# Patient Record
Sex: FEMALE | Race: BLACK OR AFRICAN AMERICAN | NOT HISPANIC OR LATINO | Employment: OTHER | URBAN - METROPOLITAN AREA
[De-identification: names, ages, dates, MRNs, and addresses within clinical notes are randomized per-mention and may not be internally consistent; named-entity substitution may affect disease eponyms.]

---

## 2017-08-28 ENCOUNTER — HISTORICAL (OUTPATIENT)
Dept: INTERNAL MEDICINE | Facility: CLINIC | Age: 57
End: 2017-08-28

## 2017-08-28 LAB
ABS NEUT (OLG): 2.63 X10(3)/MCL (ref 2.1–9.2)
ALBUMIN SERPL-MCNC: 4 GM/DL (ref 3.4–5)
ALBUMIN/GLOB SERPL: 1 RATIO (ref 1–2)
ALP SERPL-CCNC: 81 UNIT/L (ref 45–117)
ALT SERPL-CCNC: 37 UNIT/L (ref 12–78)
APPEARANCE, UA: CLEAR
AST SERPL-CCNC: 21 UNIT/L (ref 15–37)
BACTERIA #/AREA URNS AUTO: ABNORMAL /[HPF]
BASOPHILS # BLD AUTO: 0.09 X10(3)/MCL
BASOPHILS NFR BLD AUTO: 2 % (ref 0–1)
BILIRUB SERPL-MCNC: 0.5 MG/DL (ref 0.2–1)
BILIRUB UR QL STRIP: NEGATIVE
BILIRUBIN DIRECT+TOT PNL SERPL-MCNC: 0.1 MG/DL
BILIRUBIN DIRECT+TOT PNL SERPL-MCNC: 0.4 MG/DL
BUN SERPL-MCNC: 12 MG/DL (ref 7–18)
CALCIUM SERPL-MCNC: 9.6 MG/DL (ref 8.5–10.1)
CHLORIDE SERPL-SCNC: 107 MMOL/L (ref 98–107)
CHOLEST SERPL-MCNC: 150 MG/DL
CHOLEST/HDLC SERPL: 1.7 {RATIO} (ref 0–4.4)
CO2 SERPL-SCNC: 27 MMOL/L (ref 21–32)
COLOR UR: COLORLESS
CREAT SERPL-MCNC: 0.7 MG/DL (ref 0.6–1.3)
CREAT UR-MCNC: 15 MG/DL
EOSINOPHIL # BLD AUTO: 0.14 X10(3)/MCL
EOSINOPHIL NFR BLD AUTO: 3 % (ref 0–5)
ERYTHROCYTE [DISTWIDTH] IN BLOOD BY AUTOMATED COUNT: 12.2 % (ref 11.5–14.5)
EST. AVERAGE GLUCOSE BLD GHB EST-MCNC: 131 MG/DL
GLOBULIN SER-MCNC: 4.6 GM/ML (ref 2.3–3.5)
GLUCOSE (UA): NORMAL
GLUCOSE SERPL-MCNC: 82 MG/DL (ref 74–106)
HBA1C MFR BLD: 6.2 % (ref 4.2–6.3)
HCT VFR BLD AUTO: 40.1 % (ref 35–46)
HDLC SERPL-MCNC: 86 MG/DL
HGB BLD-MCNC: 13.4 GM/DL (ref 12–16)
HGB UR QL STRIP: NEGATIVE
HYALINE CASTS #/AREA URNS LPF: ABNORMAL /[LPF]
IMM GRANULOCYTES # BLD AUTO: 0.01 10*3/UL
IMM GRANULOCYTES NFR BLD AUTO: 0 %
KETONES UR QL STRIP: NEGATIVE
LDLC SERPL CALC-MCNC: 56 MG/DL (ref 0–130)
LEUKOCYTE ESTERASE UR QL STRIP: 75 LEU/UL
LYMPHOCYTES # BLD AUTO: 1.82 X10(3)/MCL
LYMPHOCYTES NFR BLD AUTO: 37 % (ref 15–40)
MCH RBC QN AUTO: 30 PG (ref 26–34)
MCHC RBC AUTO-ENTMCNC: 33.4 GM/DL (ref 31–37)
MCV RBC AUTO: 89.7 FL (ref 80–100)
MICROALBUMIN UR-MCNC: 15.3 MG/L (ref 0–19)
MICROALBUMIN/CREAT RATIO PNL UR: 102 MCG/MG CR (ref 0–29)
MONOCYTES # BLD AUTO: 0.25 X10(3)/MCL
MONOCYTES NFR BLD AUTO: 5 % (ref 4–12)
NEUTROPHILS # BLD AUTO: 2.63 X10(3)/MCL
NEUTROPHILS NFR BLD AUTO: 53 X10(3)/MCL
NITRITE UR QL STRIP: NEGATIVE
PH UR STRIP: 6.5 [PH] (ref 4.5–8)
PLATELET # BLD AUTO: 229 X10(3)/MCL (ref 130–400)
PMV BLD AUTO: 11.5 FL (ref 7.4–10.4)
POTASSIUM SERPL-SCNC: 3.9 MMOL/L (ref 3.5–5.1)
PROT SERPL-MCNC: 8.6 GM/DL (ref 6.4–8.2)
PROT UR QL STRIP: NEGATIVE
RBC # BLD AUTO: 4.47 X10(6)/MCL (ref 4–5.2)
RBC #/AREA URNS AUTO: ABNORMAL /[HPF]
SODIUM SERPL-SCNC: 143 MMOL/L (ref 136–145)
SP GR UR STRIP: 1 (ref 1–1.03)
SQUAMOUS #/AREA URNS LPF: ABNORMAL /[LPF]
TRIGL SERPL-MCNC: 42 MG/DL
TSH SERPL-ACNC: 2.46 MIU/L (ref 0.36–3.74)
UROBILINOGEN UR STRIP-ACNC: NORMAL
VLDLC SERPL CALC-MCNC: 8 MG/DL
WBC # SPEC AUTO: 4.9 X10(3)/MCL (ref 4.5–11)
WBC #/AREA URNS AUTO: ABNORMAL /HPF

## 2017-09-13 ENCOUNTER — HISTORICAL (OUTPATIENT)
Dept: INTERNAL MEDICINE | Facility: CLINIC | Age: 57
End: 2017-09-13

## 2017-09-20 LAB
COLOR STL: NORMAL
CONSISTENCY STL: NORMAL
HEMOCCULT SP1 STL QL: NEGATIVE

## 2017-09-22 LAB
COLOR STL: NORMAL
CONSISTENCY STL: NORMAL
HEMOCCULT SP2 STL QL: NEGATIVE

## 2017-09-24 LAB
COLOR STL: NORMAL
CONSISTENCY STL: NORMAL

## 2017-09-25 ENCOUNTER — HISTORICAL (OUTPATIENT)
Dept: INTERNAL MEDICINE | Facility: CLINIC | Age: 57
End: 2017-09-25

## 2017-09-29 ENCOUNTER — HISTORICAL (OUTPATIENT)
Dept: INTERNAL MEDICINE | Facility: CLINIC | Age: 57
End: 2017-09-29

## 2017-10-03 ENCOUNTER — HISTORICAL (OUTPATIENT)
Dept: RADIOLOGY | Facility: HOSPITAL | Age: 57
End: 2017-10-03

## 2018-03-07 ENCOUNTER — HISTORICAL (OUTPATIENT)
Dept: INTERNAL MEDICINE | Facility: CLINIC | Age: 58
End: 2018-03-07

## 2018-03-07 LAB
ALBUMIN SERPL-MCNC: 3.7 GM/DL (ref 3.4–5)
ALBUMIN/GLOB SERPL: 1 RATIO (ref 1–2)
ALP SERPL-CCNC: 79 UNIT/L (ref 45–117)
ALT SERPL-CCNC: 24 UNIT/L (ref 12–78)
AST SERPL-CCNC: 18 UNIT/L (ref 15–37)
BILIRUB SERPL-MCNC: 0.5 MG/DL (ref 0.2–1)
BILIRUBIN DIRECT+TOT PNL SERPL-MCNC: 0.2 MG/DL
BILIRUBIN DIRECT+TOT PNL SERPL-MCNC: 0.3 MG/DL
BUN SERPL-MCNC: 15 MG/DL (ref 7–18)
CALCIUM SERPL-MCNC: 9.3 MG/DL (ref 8.5–10.1)
CHLORIDE SERPL-SCNC: 101 MMOL/L (ref 98–107)
CO2 SERPL-SCNC: 31 MMOL/L (ref 21–32)
CREAT SERPL-MCNC: 1 MG/DL (ref 0.6–1.3)
EST. AVERAGE GLUCOSE BLD GHB EST-MCNC: 111 MG/DL
GLOBULIN SER-MCNC: 4.2 GM/ML (ref 2.3–3.5)
GLUCOSE SERPL-MCNC: 88 MG/DL (ref 74–106)
HBA1C MFR BLD: 5.5 % (ref 4.2–6.3)
HIV 1+2 AB+HIV1 P24 AG SERPL QL IA: NONREACTIVE
POTASSIUM SERPL-SCNC: 3.5 MMOL/L (ref 3.5–5.1)
PROT SERPL-MCNC: 7.9 GM/DL (ref 6.4–8.2)
SODIUM SERPL-SCNC: 140 MMOL/L (ref 136–145)

## 2018-08-03 ENCOUNTER — HISTORICAL (OUTPATIENT)
Dept: INTERNAL MEDICINE | Facility: CLINIC | Age: 58
End: 2018-08-03

## 2018-08-03 LAB
ABS NEUT (OLG): 1.87 X10(3)/MCL (ref 2.1–9.2)
ALBUMIN SERPL-MCNC: 3.9 GM/DL (ref 3.4–5)
ALBUMIN/GLOB SERPL: 1 RATIO (ref 1–2)
ALP SERPL-CCNC: 63 UNIT/L (ref 45–117)
ALT SERPL-CCNC: 46 UNIT/L (ref 12–78)
APPEARANCE, UA: ABNORMAL
AST SERPL-CCNC: 26 UNIT/L (ref 15–37)
BACTERIA #/AREA URNS AUTO: ABNORMAL /[HPF]
BASOPHILS # BLD AUTO: 0.1 X10(3)/MCL
BASOPHILS NFR BLD AUTO: 3 %
BILIRUB SERPL-MCNC: 0.6 MG/DL (ref 0.2–1)
BILIRUB UR QL STRIP: NEGATIVE
BILIRUBIN DIRECT+TOT PNL SERPL-MCNC: 0.2 MG/DL
BILIRUBIN DIRECT+TOT PNL SERPL-MCNC: 0.4 MG/DL
BUN SERPL-MCNC: 15 MG/DL (ref 7–18)
CALCIUM SERPL-MCNC: 8.9 MG/DL (ref 8.5–10.1)
CHLORIDE SERPL-SCNC: 102 MMOL/L (ref 98–107)
CHOLEST SERPL-MCNC: 132 MG/DL
CHOLEST/HDLC SERPL: 1.9 {RATIO} (ref 0–4.4)
CO2 SERPL-SCNC: 24 MMOL/L (ref 21–32)
COLOR UR: YELLOW
CREAT SERPL-MCNC: 1.1 MG/DL (ref 0.6–1.3)
CREAT UR-MCNC: 82 MG/DL
EOSINOPHIL # BLD AUTO: 0.12 10*3/UL
EOSINOPHIL NFR BLD AUTO: 3 %
ERYTHROCYTE [DISTWIDTH] IN BLOOD BY AUTOMATED COUNT: 12.2 % (ref 11.5–14.5)
EST. AVERAGE GLUCOSE BLD GHB EST-MCNC: 103 MG/DL
GLOBULIN SER-MCNC: 4 GM/ML (ref 2.3–3.5)
GLUCOSE (UA): NORMAL
GLUCOSE SERPL-MCNC: 96 MG/DL (ref 74–106)
HBA1C MFR BLD: 5.2 % (ref 4.2–6.3)
HCT VFR BLD AUTO: 36.2 % (ref 35–46)
HDLC SERPL-MCNC: 71 MG/DL
HGB BLD-MCNC: 12.1 GM/DL (ref 12–16)
HGB UR QL STRIP: NEGATIVE
HIV 1+2 AB+HIV1 P24 AG SERPL QL IA: NONREACTIVE
HYALINE CASTS #/AREA URNS LPF: ABNORMAL /[LPF]
KETONES UR QL STRIP: NEGATIVE
LDLC SERPL CALC-MCNC: 50 MG/DL (ref 0–130)
LEUKOCYTE ESTERASE UR QL STRIP: 500 LEU/UL
LYMPHOCYTES # BLD AUTO: 1.5 X10(3)/MCL
LYMPHOCYTES NFR BLD AUTO: 39 % (ref 13–40)
MCH RBC QN AUTO: 30.5 PG (ref 26–34)
MCHC RBC AUTO-ENTMCNC: 33.4 GM/DL (ref 31–37)
MCV RBC AUTO: 91.2 FL (ref 80–100)
MICROALBUMIN UR-MCNC: 10 MG/L (ref 0–19)
MICROALBUMIN/CREAT RATIO PNL UR: 12.2 MCG/MG CR (ref 0–29)
MONOCYTES # BLD AUTO: 0.27 X10(3)/MCL
MONOCYTES NFR BLD AUTO: 7 % (ref 4–12)
NEUTROPHILS # BLD AUTO: 1.87 X10(3)/MCL
NEUTROPHILS NFR BLD AUTO: 48 X10(3)/MCL
NITRITE UR QL STRIP: NEGATIVE
PH UR STRIP: 6.5 [PH] (ref 4.5–8)
PLATELET # BLD AUTO: 225 X10(3)/MCL (ref 130–400)
PMV BLD AUTO: 11.1 FL (ref 7.4–10.4)
POTASSIUM SERPL-SCNC: 3 MMOL/L (ref 3.5–5.1)
PROT SERPL-MCNC: 7.9 GM/DL (ref 6.4–8.2)
PROT UR QL STRIP: NEGATIVE
RBC # BLD AUTO: 3.97 X10(6)/MCL (ref 4–5.2)
RBC #/AREA URNS AUTO: ABNORMAL /[HPF]
SODIUM SERPL-SCNC: 135 MMOL/L (ref 136–145)
SP GR UR STRIP: 1.01 (ref 1–1.03)
SQUAMOUS #/AREA URNS LPF: >100 /[LPF]
TRIGL SERPL-MCNC: 55 MG/DL
TSH SERPL-ACNC: 2.18 MIU/L (ref 0.36–3.74)
UROBILINOGEN UR STRIP-ACNC: NORMAL
VLDLC SERPL CALC-MCNC: 11 MG/DL
WBC # SPEC AUTO: 3.9 X10(3)/MCL (ref 4.5–11)
WBC #/AREA URNS AUTO: ABNORMAL /HPF

## 2018-08-07 ENCOUNTER — HISTORICAL (OUTPATIENT)
Dept: RADIOLOGY | Facility: HOSPITAL | Age: 58
End: 2018-08-07

## 2018-09-17 ENCOUNTER — HISTORICAL (OUTPATIENT)
Dept: RADIOLOGY | Facility: HOSPITAL | Age: 58
End: 2018-09-17

## 2019-02-25 ENCOUNTER — HISTORICAL (OUTPATIENT)
Dept: INTERNAL MEDICINE | Facility: CLINIC | Age: 59
End: 2019-02-25

## 2019-02-28 ENCOUNTER — HISTORICAL (OUTPATIENT)
Dept: ADMINISTRATIVE | Facility: HOSPITAL | Age: 59
End: 2019-02-28

## 2019-02-28 LAB
EST. AVERAGE GLUCOSE BLD GHB EST-MCNC: 105 MG/DL
HBA1C MFR BLD: 5.3 % (ref 4.2–6.3)

## 2019-08-06 ENCOUNTER — HISTORICAL (OUTPATIENT)
Dept: ADMINISTRATIVE | Facility: HOSPITAL | Age: 59
End: 2019-08-06

## 2019-08-06 LAB
ABS NEUT (OLG): 1.86 X10(3)/MCL (ref 2.1–9.2)
ALBUMIN SERPL-MCNC: 4 GM/DL (ref 3.4–5)
ALBUMIN/GLOB SERPL: 1 RATIO (ref 1.1–2)
ALP SERPL-CCNC: 59 UNIT/L (ref 45–117)
ALT SERPL-CCNC: 20 UNIT/L (ref 12–78)
AST SERPL-CCNC: 14 UNIT/L (ref 15–37)
BASOPHILS # BLD AUTO: 0.06 X10(3)/MCL
BASOPHILS NFR BLD AUTO: 2 %
BILIRUB SERPL-MCNC: 0.5 MG/DL (ref 0.2–1)
BILIRUBIN DIRECT+TOT PNL SERPL-MCNC: 0.2 MG/DL
BILIRUBIN DIRECT+TOT PNL SERPL-MCNC: 0.3 MG/DL
BUN SERPL-MCNC: 24 MG/DL (ref 7–18)
CALCIUM SERPL-MCNC: 9.6 MG/DL (ref 8.5–10.1)
CHLORIDE SERPL-SCNC: 106 MMOL/L (ref 98–107)
CHOLEST SERPL-MCNC: 159 MG/DL
CHOLEST/HDLC SERPL: 1.9 {RATIO} (ref 0–4.4)
CO2 SERPL-SCNC: 31 MMOL/L (ref 21–32)
CREAT SERPL-MCNC: 1.1 MG/DL (ref 0.6–1.3)
EOSINOPHIL # BLD AUTO: 0.06 X10(3)/MCL
EOSINOPHIL NFR BLD AUTO: 2 %
ERYTHROCYTE [DISTWIDTH] IN BLOOD BY AUTOMATED COUNT: 12.2 % (ref 11.5–14.5)
EST. AVERAGE GLUCOSE BLD GHB EST-MCNC: 103 MG/DL
GLOBULIN SER-MCNC: 3.9 GM/ML (ref 2.3–3.5)
GLUCOSE SERPL-MCNC: 86 MG/DL (ref 74–106)
HBA1C MFR BLD: 5.2 % (ref 4.2–6.3)
HCT VFR BLD AUTO: 37.1 % (ref 35–46)
HDLC SERPL-MCNC: 85 MG/DL
HGB BLD-MCNC: 12.1 GM/DL (ref 12–16)
LDLC SERPL CALC-MCNC: 63 MG/DL (ref 0–130)
LYMPHOCYTES # BLD AUTO: 1.79 X10(3)/MCL
LYMPHOCYTES NFR BLD AUTO: 44 % (ref 13–40)
MCH RBC QN AUTO: 30.1 PG (ref 26–34)
MCHC RBC AUTO-ENTMCNC: 32.6 GM/DL (ref 31–37)
MCV RBC AUTO: 92.3 FL (ref 80–100)
MONOCYTES # BLD AUTO: 0.33 X10(3)/MCL
MONOCYTES NFR BLD AUTO: 8 % (ref 0–24)
NEUTROPHILS # BLD AUTO: 1.86 X10(3)/MCL
NEUTROPHILS NFR BLD AUTO: 45 X10(3)/MCL
PLATELET # BLD AUTO: 262 X10(3)/MCL (ref 130–400)
PMV BLD AUTO: 10.5 FL (ref 7.4–10.4)
POTASSIUM SERPL-SCNC: 3.4 MMOL/L (ref 3.5–5.1)
PROT SERPL-MCNC: 7.9 GM/DL (ref 6.4–8.2)
RBC # BLD AUTO: 4.02 X10(6)/MCL (ref 4–5.2)
SODIUM SERPL-SCNC: 141 MMOL/L (ref 136–145)
TRIGL SERPL-MCNC: 57 MG/DL
TSH SERPL-ACNC: 1.95 MIU/L (ref 0.36–3.74)
VLDLC SERPL CALC-MCNC: 11 MG/DL
WBC # SPEC AUTO: 4.1 X10(3)/MCL (ref 4.5–11)

## 2019-09-19 ENCOUNTER — HISTORICAL (OUTPATIENT)
Dept: RADIOLOGY | Facility: HOSPITAL | Age: 59
End: 2019-09-19

## 2019-11-05 ENCOUNTER — HISTORICAL (OUTPATIENT)
Dept: INTERNAL MEDICINE | Facility: CLINIC | Age: 59
End: 2019-11-05

## 2019-11-05 LAB
ABS NEUT (OLG): 1.66 X10(3)/MCL (ref 2.1–9.2)
ALBUMIN SERPL-MCNC: 3.8 GM/DL (ref 3.4–5)
ALBUMIN/GLOB SERPL: 0.9 RATIO (ref 1.1–2)
ALP SERPL-CCNC: 71 UNIT/L (ref 45–117)
ALT SERPL-CCNC: 25 UNIT/L (ref 12–78)
APPEARANCE, UA: CLEAR
AST SERPL-CCNC: 13 UNIT/L (ref 15–37)
BACTERIA #/AREA URNS AUTO: ABNORMAL /HPF
BASOPHILS # BLD AUTO: 0.1 X10(3)/MCL (ref 0–0.2)
BASOPHILS NFR BLD AUTO: 2 %
BILIRUB SERPL-MCNC: 0.6 MG/DL (ref 0.2–1)
BILIRUB UR QL STRIP: NEGATIVE
BILIRUBIN DIRECT+TOT PNL SERPL-MCNC: 0.2 MG/DL (ref 0–0.2)
BILIRUBIN DIRECT+TOT PNL SERPL-MCNC: 0.4 MG/DL
BUN SERPL-MCNC: 26 MG/DL (ref 7–18)
CALCIUM SERPL-MCNC: 9.9 MG/DL (ref 8.5–10.1)
CHLORIDE SERPL-SCNC: 105 MMOL/L (ref 98–107)
CO2 SERPL-SCNC: 30 MMOL/L (ref 21–32)
COLOR UR: COLORLESS
CREAT SERPL-MCNC: 1.1 MG/DL (ref 0.6–1.3)
CREAT UR-MCNC: 21 MG/DL
EOSINOPHIL # BLD AUTO: 0.1 X10(3)/MCL (ref 0–0.9)
EOSINOPHIL NFR BLD AUTO: 2 %
ERYTHROCYTE [DISTWIDTH] IN BLOOD BY AUTOMATED COUNT: 11.9 % (ref 11.5–14.5)
EST. AVERAGE GLUCOSE BLD GHB EST-MCNC: 97 MG/DL
GLOBULIN SER-MCNC: 4.2 GM/ML (ref 2.3–3.5)
GLUCOSE (UA): NORMAL
GLUCOSE SERPL-MCNC: 91 MG/DL (ref 74–106)
HBA1C MFR BLD: 5 % (ref 4.2–6.3)
HCT VFR BLD AUTO: 35.9 % (ref 35–46)
HGB BLD-MCNC: 11.9 GM/DL (ref 12–16)
HGB UR QL STRIP: NEGATIVE
HYALINE CASTS #/AREA URNS LPF: ABNORMAL /LPF
IMM GRANULOCYTES # BLD AUTO: 0.01 10*3/UL
IMM GRANULOCYTES NFR BLD AUTO: 0 %
KETONES UR QL STRIP: NEGATIVE
LEUKOCYTE ESTERASE UR QL STRIP: NEGATIVE
LYMPHOCYTES # BLD AUTO: 1.7 X10(3)/MCL (ref 0.6–4.6)
LYMPHOCYTES NFR BLD AUTO: 44 %
MCH RBC QN AUTO: 31.2 PG (ref 26–34)
MCHC RBC AUTO-ENTMCNC: 33.1 GM/DL (ref 31–37)
MCV RBC AUTO: 94 FL (ref 80–100)
MICROALBUMIN UR-MCNC: <5 MG/L (ref 0–19)
MICROALBUMIN/CREAT RATIO PNL UR: NORMAL MCG/MG CR (ref 0–29)
MONOCYTES # BLD AUTO: 0.3 X10(3)/MCL (ref 0.1–1.3)
MONOCYTES NFR BLD AUTO: 8 %
NEUTROPHILS # BLD AUTO: 1.66 X10(3)/MCL (ref 2.1–9.2)
NEUTROPHILS NFR BLD AUTO: 43 %
NITRITE UR QL STRIP: NEGATIVE
PH UR STRIP: 5 [PH] (ref 4.5–8)
PLATELET # BLD AUTO: 239 X10(3)/MCL (ref 130–400)
PMV BLD AUTO: 11 FL (ref 7.4–10.4)
POTASSIUM SERPL-SCNC: 3.5 MMOL/L (ref 3.5–5.1)
PROT SERPL-MCNC: 8 GM/DL (ref 6.4–8.2)
PROT UR QL STRIP: NEGATIVE
RBC # BLD AUTO: 3.82 X10(6)/MCL (ref 4–5.2)
RBC #/AREA URNS AUTO: ABNORMAL /HPF
SODIUM SERPL-SCNC: 141 MMOL/L (ref 136–145)
SP GR UR STRIP: 1 (ref 1–1.03)
SQUAMOUS #/AREA URNS LPF: ABNORMAL /LPF
UROBILINOGEN UR STRIP-ACNC: NORMAL
WBC # SPEC AUTO: 3.9 X10(3)/MCL (ref 4.5–11)
WBC #/AREA URNS AUTO: ABNORMAL /HPF

## 2020-02-20 ENCOUNTER — HISTORICAL (OUTPATIENT)
Dept: RADIOLOGY | Facility: HOSPITAL | Age: 60
End: 2020-02-20

## 2020-03-25 ENCOUNTER — HISTORICAL (OUTPATIENT)
Dept: INTERNAL MEDICINE | Facility: CLINIC | Age: 60
End: 2020-03-25

## 2020-09-08 ENCOUNTER — HISTORICAL (OUTPATIENT)
Dept: ADMINISTRATIVE | Facility: HOSPITAL | Age: 60
End: 2020-09-08

## 2020-09-08 LAB
ABS NEUT (OLG): 1.84 X10(3)/MCL (ref 2.1–9.2)
ALBUMIN SERPL-MCNC: 3.8 GM/DL (ref 3.4–5)
ALBUMIN/GLOB SERPL: 0.9 RATIO (ref 1.1–2)
ALP SERPL-CCNC: 68 UNIT/L (ref 45–117)
ALT SERPL-CCNC: 21 UNIT/L (ref 12–78)
AST SERPL-CCNC: 16 UNIT/L (ref 15–37)
BASOPHILS # BLD AUTO: 0.1 X10(3)/MCL (ref 0–0.2)
BASOPHILS NFR BLD AUTO: 2 %
BILIRUB SERPL-MCNC: 0.5 MG/DL (ref 0.2–1)
BILIRUBIN DIRECT+TOT PNL SERPL-MCNC: 0.2 MG/DL (ref 0–0.2)
BILIRUBIN DIRECT+TOT PNL SERPL-MCNC: 0.3 MG/DL
BUN SERPL-MCNC: 17 MG/DL (ref 7–18)
CALCIUM SERPL-MCNC: 9.6 MG/DL (ref 8.5–10.1)
CHLORIDE SERPL-SCNC: 107 MMOL/L (ref 98–107)
CHOLEST SERPL-MCNC: 198 MG/DL
CHOLEST/HDLC SERPL: 2 {RATIO} (ref 0–4.4)
CO2 SERPL-SCNC: 28 MMOL/L (ref 21–32)
CREAT SERPL-MCNC: 0.9 MG/DL (ref 0.6–1.3)
DEPRECATED CALCIDIOL+CALCIFEROL SERPL-MC: 16.3 NG/ML (ref 30–80)
EOSINOPHIL # BLD AUTO: 0.1 X10(3)/MCL (ref 0–0.9)
EOSINOPHIL NFR BLD AUTO: 2 %
ERYTHROCYTE [DISTWIDTH] IN BLOOD BY AUTOMATED COUNT: 12.8 % (ref 11.5–14.5)
EST. AVERAGE GLUCOSE BLD GHB EST-MCNC: 108 MG/DL
GLOBULIN SER-MCNC: 4.2 GM/ML (ref 2.3–3.5)
GLUCOSE SERPL-MCNC: 88 MG/DL (ref 74–106)
HBA1C MFR BLD: 5.4 % (ref 4.2–6.3)
HCT VFR BLD AUTO: 35.6 % (ref 35–46)
HDLC SERPL-MCNC: 101 MG/DL (ref 40–59)
HGB BLD-MCNC: 11.3 GM/DL (ref 12–16)
LDLC SERPL CALC-MCNC: 88 MG/DL
LYMPHOCYTES # BLD AUTO: 1.8 X10(3)/MCL (ref 0.6–4.6)
LYMPHOCYTES NFR BLD AUTO: 43 %
MCH RBC QN AUTO: 29.8 PG (ref 26–34)
MCHC RBC AUTO-ENTMCNC: 31.7 GM/DL (ref 31–37)
MCV RBC AUTO: 93.9 FL (ref 80–100)
MONOCYTES # BLD AUTO: 0.3 X10(3)/MCL (ref 0.1–1.3)
MONOCYTES NFR BLD AUTO: 8 %
NEUTROPHILS # BLD AUTO: 1.84 X10(3)/MCL (ref 2.1–9.2)
NEUTROPHILS NFR BLD AUTO: 45 %
PLATELET # BLD AUTO: 220 X10(3)/MCL (ref 130–400)
PMV BLD AUTO: 11.5 FL (ref 7.4–10.4)
POTASSIUM SERPL-SCNC: 3.6 MMOL/L (ref 3.5–5.1)
PROT SERPL-MCNC: 8 GM/DL (ref 6.4–8.2)
RBC # BLD AUTO: 3.79 X10(6)/MCL (ref 4–5.2)
SODIUM SERPL-SCNC: 142 MMOL/L (ref 136–145)
T4 FREE SERPL-MCNC: 0.94 NG/DL (ref 0.76–1.46)
TRIGL SERPL-MCNC: 44 MG/DL
TSH SERPL-ACNC: 1.45 MIU/L (ref 0.36–3.74)
VLDLC SERPL CALC-MCNC: 9 MG/DL
WBC # SPEC AUTO: 4.1 X10(3)/MCL (ref 4.5–11)

## 2020-11-09 ENCOUNTER — HISTORICAL (OUTPATIENT)
Dept: ADMINISTRATIVE | Facility: HOSPITAL | Age: 60
End: 2020-11-09

## 2020-11-09 LAB
APPEARANCE, UA: CLEAR
BACTERIA #/AREA URNS AUTO: ABNORMAL /HPF
BILIRUB UR QL STRIP: NEGATIVE
COLOR UR: NORMAL
CREAT UR-MCNC: 115.2 MG/DL (ref 45–106)
GLUCOSE (UA): NEGATIVE
HGB UR QL STRIP: NEGATIVE
HYALINE CASTS #/AREA URNS LPF: ABNORMAL /LPF
KETONES UR QL STRIP: NEGATIVE
LEUKOCYTE ESTERASE UR QL STRIP: NEGATIVE
MICROALBUMIN UR-MCNC: 5.1 UG/ML
MICROALBUMIN/CREAT RATIO PNL UR: 44.3 MG/GM CR (ref 0–30)
NITRITE UR QL STRIP: NEGATIVE
PH UR STRIP: 5.5 [PH] (ref 4.5–8)
PROT UR QL STRIP: NEGATIVE
RBC #/AREA URNS AUTO: ABNORMAL /HPF
SP GR UR STRIP: 1.02 (ref 1–1.03)
SQUAMOUS #/AREA URNS LPF: ABNORMAL /LPF
UROBILINOGEN UR STRIP-ACNC: NORMAL
WBC #/AREA URNS AUTO: ABNORMAL /HPF

## 2021-06-08 ENCOUNTER — HISTORICAL (OUTPATIENT)
Dept: RADIOLOGY | Facility: HOSPITAL | Age: 61
End: 2021-06-08

## 2022-04-10 ENCOUNTER — HISTORICAL (OUTPATIENT)
Dept: ADMINISTRATIVE | Facility: HOSPITAL | Age: 62
End: 2022-04-10
Payer: MEDICARE

## 2022-04-26 VITALS
WEIGHT: 146.63 LBS | DIASTOLIC BLOOD PRESSURE: 86 MMHG | SYSTOLIC BLOOD PRESSURE: 118 MMHG | HEIGHT: 67 IN | BODY MASS INDEX: 23.01 KG/M2

## 2022-05-02 NOTE — HISTORICAL [facility]
This is a historical note converted from Laura. Formatting and pictures may have been removed.  Please reference Laura for original formatting and attached multimedia. History of Present Illness  A 58 year old -American female with history of non-insulin diabetes mellitus type II and CVA presents to clinic for a follow up appointment.  Patient was previously seen by another provider at this clinic.  Last hemoglobin A1c 5.2 08/2018, currently on Metformin 500 mg daily.  She currently follows Neurology due to history of stroke, currently on Plavix and aspirin. No new symptoms. Patient has residual symptoms: expressive aphasia.  She reports that she had seizures in the past and was placed on Keppra by Neurology. She states that she stopped taking the medication and started having seizures. She started the medication again and reports she has not had a seizure in two years.?  She denies chest pain, shortness of breath, nausea, vomiting, loss of appetite, weight loss, polyuria, or polydipsia.  Review of Systems  ???? Constitutional: No fever, No chills, No sweats, No weakness, No fatigue, No decreased activity.  ?????Eye: No recent visual problem, No icterus, No double vision.  ?????Ear/Nose/Mouth/Throat: No nasal congestion, No sore throat.  ?????Respiratory: No shortness of breath, No cough, No sputum production, No hemoptysis, No wheezing, No cyanosis.  ?????Cardiovascular: No chest pain, No palpitations, No peripheral edema, No syncope.  ?????Gastrointestinal: No nausea, No vomiting, No diarrhea, No constipation, No hematemesis.  ?????Genitourinary: No dysuria, No hematuria, No change in urine stream, No urethral discharge.  ?????Hematology/Lymphatics: No bruising tendency, No bleeding tendency.  ?????Endocrine: No polyuria, No cold intolerance, No heat intolerance.  ?????Immunologic: Not immunocompromised, No recurrent fevers.  ?????Musculoskeletal: No joint pain, No claudication.  ?????Integumentary: No  rash, No pruritus, No abrasions, No petechiae.  ?????Neurologic: Alert and oriented X4, No abnormal balance, No numbness, No tingling.  ?????Psychiatric: No anxiety, depression, Not suicidal, Not delusional, No hallucinations.  Physical Exam  Vitals & Measurements  T:?36.8? ?C (Oral)? HR:?51(Peripheral)? BP:?131/85?  HT:?170?cm? WT:?68.1?kg? BMI:?23.56?  ???? General: Alert and oriented, No acute distress.  ?????Eye: Pupils are equal, round and reactive to light, Extraocular movements are intact, Normal conjunctiva, Vision unchanged.  ?????HENT: Normocephalic, Normal hearing, Oral mucosa is moist.  ?????Neck: Supple, No carotid bruit, No jugular venous distention, No thyromegaly.  ?????Respiratory: Lungs are clear to auscultation, Respirations are non-labored, Breath sounds are equal, Symmetrical chest wall expansion, No chest wall tenderness.  ?????Cardiovascular: Normal rate, Regular rhythm, No murmur, No gallop, Good pulses equal in all extremities, Normal peripheral perfusion, No edema.  ?????Gastrointestinal: Soft, Non-tender, Non-distended, Normal bowel sounds.  ?????Genitourinary: No costovertebral angle tenderness, No suprapubic tenderness.  ?????Musculoskeletal: Normal range of motion, Normal strength, No swelling, No deformity, Normal gait.  ?????Integumentary: Warm, No pallor, No rash.  ?????Neurologic: Alert, Oriented, Normal sensory, Normal motor function, No focal deficits, Cranial Nerves II-XII are grossly intact, Monofilament examination: No sensory loss on plantar or dorsal surface of feet bilaterally.  ?????Psychiatric: Cooperative, Appropriate mood & affect.  ???? Foot examination: No ulcer or skin breakage on plantar or dorsal surface bilaterally. Dorsalis pedis pulse palpated bilaterally.  Assessment/Plan  1.?Type 2 diabetes mellitus?E11.9  Hemoglobin A1c August 2018 was 5.2.  Repeat hemoglobin A1c today.  Currently on metformin 500 mg daily, continue.  Discussion with patient about compliance  of medication and lifestyle modifications. ?Patient hemoglobin A1c continues to be low, will decrease metformin to 250 mg daily and then discontinue.  Monofilament examination was normal 11/2018, next screening 11/2019.  2.?CVA (cerebral vascular accident)?I63.9  History of stroke.  Expressive aphasia  Currently following with neurology, continue.  Plavix and aspirin.  3.?HLD (hyperlipidemia)?E78.5  Continue statin.  4.?HTN (hypertension)?I10  Blood pressure 131/85, stable  Continue medications.  5.?Seizures?R56.9  Last seizure 2 years ago  Continue Keppra  6.?Glaucoma?H40.9  Continue to follow ophthalmology.  Continue medications.   Problem List/Past Medical History  Ongoing  CVA (cerebral vascular accident)  Depression  Diabetes  Diabetes  Glaucoma  HLD (hyperlipidemia)  HTN (hypertension)  HTN (hypertension)  Seizure disorder  Seizures  Historical  No qualifying data  Procedure/Surgical History  Total hysterectomy (06/01/1983)  COMPLETE HYST.   Medications  amLODIPine 10 mg oral tablet, 10 mg= 1 tab(s), Oral, Daily, 3 refills  Aspir-Low 81 mg oral delayed release tablet, 81 mg= 1 tab(s), Oral, Daily  atorvastatin 20 mg oral tablet, See Instructions, 1 refills  blood pressure cuff, See Instructions  brimonidine 0.2% ophthalmic solution, 1 drop(s), Eye-Both, TID, 9 refills  Bystolic 5 mg oral tablet, 5 mg= 1 tab(s), Oral, Daily  Cosopt 2.23%-0.68% ophthalmic solution, 1 drop(s), Eye-Both, BID, 11 refills  glucometer and supplies, See Instructions  hydrochlorothiazide 12.5 mg oral tablet, 12.5 mg= 1 tab(s), Oral, Daily, 3 refills  latanoprost 0.005% ophthalmic solution, 1 drop(s), Eye-Both, qPM, 9 refills  levetiracetam 500 mg oral tablet, See Instructions  lisinopril 40 mg oral tablet, 40 mg= 1 tab(s), Oral, Daily, 3 refills  metformin 500 mg oral tablet, See Instructions, 1 refills  Plavix 75 mg oral tablet, 75 mg= 1 tab(s), Oral, Daily, 3 refills  venlafaxine 75 mg oral capsule, extended release, See  Instructions, 1 refills  Allergies  No Known Medication Allergies  Social History  Alcohol - Denies Alcohol Use, 12/07/2015  Never, 04/19/2016  Employment/School  Unemployed, Highest education level: High school., 02/29/2016  Exercise  Exercise duration: 240. Exercise frequency: 5-6 times/week. Self assessment: Fair condition. Exercise type: stationary bike 50 miles per week., 09/25/2017  Home/Environment  Lives with Alone. Living situation: Home/Independent. Home equipment: Glucose monitoring, bp cuff. Alcohol abuse in household: No. Substance abuse in household: No. Smoker in household: No. Feels unsafe at home: No. Safe place to go: Yes. Family/Friends available for support: Yes., 02/29/2016  Nutrition/Health  Regular, low carb, ;low salt. high fiber, low fat, Caffeine intake amount: diet soda once a week. Wants to lose weight: Yes. Sleeping concerns: No. Feels highly stressed: No., 02/29/2016  Substance Abuse - Denies Substance Abuse, 12/07/2015  Never, 05/30/2016  Tobacco - Denies Tobacco Use, 12/07/2015  Never smoker, N/A, 11/29/2018  Never smoker Use:., 09/20/2018  Family History  Diabetes mellitus type 2: Mother.  Glaucoma.: Mother.  Immunizations  Vaccine Date Status   tetanus/diphtheria/pertussis, acel(Tdap) 08/28/2017 Given   influenza virus vaccine, inactivated 11/30/2016 Given   pneumococcal 23-polyvalent vaccine 11/30/2016 Given   Health Maintenance  Health Maintenance  ???Pending?(in the next year)  ??? ??OverDue  ??? ? ? ?Coronary Artery Disease Maintenance-Antiplatelet Agent Prescribed due??and every?  ??? ? ? ?Coronary Artery Disease Maintenance-Lipid Lowering Therapy due??and every?  ??? ??Due?  ??? ? ? ?ADL Screening due??02/28/19??and every 1??year(s)  ??? ??Due In Future?  ??? ? ? ?Aspirin Therapy for CVD Prevention not due until??05/03/19??and every 1??year(s)  ??? ? ? ?Alcohol Misuse Screening not due until??05/03/19??and every 1??year(s)  ??? ? ? ?Diabetes Maintenance-Foot Exam not due  until??08/02/19??and every 1??year(s)  ??? ? ? ?Diabetes Maintenance-Fasting Lipid Profile not due until??08/03/19??and every 1??year(s)  ??? ? ? ?Hypertension Management-BMP not due until??08/03/19??and every 1??year(s)  ??? ? ? ?Diabetes Maintenance-HgbA1c not due until??08/20/19??and every 1??year(s)  ??? ? ? ?Diabetes Maintenance-Eye Exam not due until??09/20/19??and every 1??year(s)  ??? ? ? ?Blood Pressure Screening not due until??11/29/19??and every 1??year(s)  ??? ? ? ?Body Mass Index Check not due until??11/29/19??and every 1??year(s)  ??? ? ? ?Hypertension Management-Blood Pressure not due until??11/29/19??and every 1??year(s)  ??? ? ? ?Obesity Screening not due until??11/29/19??and every 1??year(s)  ??? ? ? ?Colorectal Screening not due until??02/24/20??and every 1??year(s)  ???Satisfied?(in the past 1 year)  ??? ??Satisfied?  ??? ? ? ?Alcohol Misuse Screening on??05/03/18.??Satisfied by Echo Thomson  ??? ? ? ?Aspirin Therapy for CVD Prevention on??05/03/18.??Satisfied by Echo Thomson??Reason: Expectation Satisfied Elsewhere  ??? ? ? ?Blood Pressure Screening on??11/29/18.??Satisfied by Radha Martin  ??? ? ? ?Body Mass Index Check on??11/29/18.??Satisfied by Radha Martin  ??? ? ? ?Breast Cancer Screening on??09/17/18.??Satisfied by Savannah Urbina  ??? ? ? ?Colorectal Screening on??02/24/19.??Satisfied by Kael Aranda  ??? ? ? ?Coronary Artery Disease Maintenance-Lipid Lowering Therapy on??08/02/18.??Satisfied by Echo Thomson  ??? ? ? ?Depression Screening on??08/20/18.??Satisfied by Taylor Gordon  ??? ? ? ?Diabetes Maintenance-Eye Exam on??08/29/18.  ??? ? ? ?Diabetes Screening on??08/03/18.??Satisfied by Sathish Bright Jr.  ??? ? ? ?Hypertension Management-Blood Pressure on??11/29/18.??Satisfied by Radha Martin  ??? ? ? ?Influenza Vaccine on??08/02/18.??Satisfied by Nuris Dwyer LPN  ??? ? ? ?Lipid Screening  on??08/03/18.??Satisfied by Sathish Bright Jr.  ??? ? ? ?Obesity Screening on??11/29/18.??Satisfied by Radha Martin  ?  ?     [1]?[facility]; Yvonne Adkins MD 11/29/2018 13:19 CST  [2]?[facility]; Yvonne Adkins MD 11/29/2018 13:19 CST

## 2022-05-02 NOTE — HISTORICAL [facility]
This is a historical note converted from Laura. Formatting and pictures may have been removed.  Please reference Laura for original formatting and attached multimedia. Chief Complaint  RTC  History of Present Illness  A 58 year old -American female with history of non-insulin diabetes mellitus type II and CVA presents to clinic for a follow up appointment.  Patient was previously seen by another provider at this clinic.  Last hemoglobin A1c 5.3, currently on Metformin 500 mg daily.  She currently follows Neurology due to history of stroke, currently on Plavix and aspirin. No new symptoms. Patient has residual symptoms: expressive aphasia.  She reports that she had seizures in the past and was placed on Keppra by Neurology. She states that she stopped taking the medication and started having seizures. She started the medication again and reports she has not had a seizure in two years.?  She denies chest pain, shortness of breath, nausea, vomiting, loss of appetite, weight loss, polyuria, or polydipsia.  Review of Systems  ???? Constitutional: No fever, No chills, No sweats, No weakness, No fatigue, No decreased activity.  ?????Eye: No recent visual problem, No icterus, No double vision.  ?????Ear/Nose/Mouth/Throat: No nasal congestion, No sore throat.  ?????Respiratory: No shortness of breath, No cough, No sputum production, No hemoptysis, No wheezing, No cyanosis.  ?????Cardiovascular: No chest pain, No palpitations, No peripheral edema, No syncope.  ?????Gastrointestinal: No nausea, No vomiting, No diarrhea, No constipation, No hematemesis.  ?????Genitourinary: No dysuria, No hematuria, No change in urine stream, No urethral discharge.  ?????Hematology/Lymphatics: No bruising tendency, No bleeding tendency.  ?????Endocrine: No polyuria, No cold intolerance, No heat intolerance.  ?????Immunologic: Not immunocompromised, No recurrent fevers.  ?????Musculoskeletal: No joint pain, No  claudication.  ?????Integumentary: No rash, No pruritus, No abrasions, No petechiae.  ?????Neurologic: Alert and oriented X4, No abnormal balance, No numbness, No tingling.  ?????Psychiatric: No anxiety, depression, Not suicidal, Not delusional, No hallucinations. [1]  Physical Exam  Vitals & Measurements  T:?37.0? ?C (Oral)? HR:?63(Peripheral)? RR:?16? BP:?101/74?  HT:?170?cm? WT:?66.2?kg? BMI:?22.91?  ???? General: Alert and oriented, No acute distress.  ?????Eye: Pupils are equal, round and reactive to light, Extraocular movements are intact, Normal conjunctiva, Vision unchanged.  ?????HENT: Normocephalic, Normal hearing, Oral mucosa is moist.  ?????Neck: Supple, No carotid bruit, No jugular venous distention, No thyromegaly.  ?????Respiratory: Lungs are clear to auscultation, Respirations are non-labored, Breath sounds are equal, Symmetrical chest wall expansion, No chest wall tenderness.  ?????Cardiovascular: Normal rate, Regular rhythm, No murmur, No gallop, Good pulses equal in all extremities, Normal peripheral perfusion, No edema.  ?????Gastrointestinal: Soft, Non-tender, Non-distended, Normal bowel sounds.  ?????Genitourinary: No costovertebral angle tenderness, No suprapubic tenderness.  ?????Musculoskeletal: Normal range of motion, Normal strength, No swelling, No deformity, Normal gait.  ?????Integumentary: Warm, No pallor, No rash.  ?????Neurologic: Alert, Oriented, Normal sensory, Normal motor function, No focal deficits, Cranial Nerves II-XII are grossly intact, Monofilament examination: No sensory loss on plantar or dorsal surface of feet bilaterally.  ?????Psychiatric: Cooperative, Appropriate mood & affect.  ???? Foot examination: No ulcer or skin breakage on plantar or dorsal surface bilaterally. Dorsalis pedis pulse palpated bilaterally.   Assessment/Plan  1.?Type 2 diabetes mellitus?E11.9  Hemoglobin A1c 02/2019 was 5.3.  Repeat hemoglobin A1c today.  Currently on metformin 500 mg daily,  continue.  Discussion with patient about compliance of medication and lifestyle modifications. ?Patient hemoglobin A1c continues to be low, will decrease metformin to 250 mg daily and then discontinue.  Monofilament examination was normal 11/2018, next screening 11/2019.  Ordered:  CBC w/ Auto Diff, Routine collect, *Est. 08/06/19 3:00:00 CDT, Blood, Order for future visit, *Est. Stop date 08/06/19 3:00:00 CDT, Lab Collect, Type 2 diabetes mellitus, 08/06/19 13:38:00 CDT  Comprehensive Metabolic Panel, Routine collect, *Est. 08/06/19 3:00:00 CDT, Blood, Order for future visit, *Est. Stop date 08/06/19 3:00:00 CDT, Lab Collect, Type 2 diabetes mellitus, 08/06/19 13:38:00 CDT  Creatinine Urine, Routine collect, Urine, Order for future visit, *Est. 08/06/19 3:00:00 CDT, *Est. Stop date 08/06/19 3:00:00 CDT, Nurse collect, Type 2 diabetes mellitus, 08/06/19 13:39:00 CDT  Hemoglobin A1C [facility], Routine collect, *Est. 08/06/19 3:00:00 CDT, Blood, Order for future visit, *Est. Stop date 08/06/19 3:00:00 CDT, Lab Collect, Type 2 diabetes mellitus, 08/06/19 13:40:00 CDT  Microalbum/Creatinine Ratio Urine (Microalb/Creat), Routine collect, Urine, Order for future visit, *Est. 08/06/19 3:00:00 CDT, *Est. Stop date 08/06/19 3:00:00 CDT, Nurse collect, Type 2 diabetes mellitus  Thyroid Stimulating Hormone, Routine collect, *Est. 08/06/19 3:00:00 CDT, Blood, Order for future visit, *Est. Stop date 08/06/19 3:00:00 CDT, Lab Collect, Type 2 diabetes mellitus, 08/06/19 13:39:00 CDT  Urinalysis with Microscopic if Indicated, Routine collect, Urine, Order for future visit, *Est. 08/06/19 3:00:00 CDT, *Est. Stop date 08/06/19 3:00:00 CDT, Nurse collect, Type 2 diabetes mellitus  ?  2.?CVA (cerebral vascular accident)?I63.9  History of stroke.  Expressive aphasia  Currently following with neurology, continue.  Plavix and aspirin.  ?  3.?HLD (hyperlipidemia)?E78.5  Continue statin.  Ordered:  Lipid Panel, Routine collect, *Est. 08/06/19  3:00:00 CDT, Blood, Order for future visit, *Est. Stop date 08/06/19 3:00:00 CDT, Lab Collect, HLD (hyperlipidemia), 08/06/19 13:39:00 CDT  ?  4.?HTN (hypertension)?I10  Blood pressure 101/74, stable  Continue medications.  ?  5.?Seizure disorder?G40.909  Last seizure 2 years ago  Continue Keppra  ?  6.?Glaucoma?H40.9  Continue to follow ophthalmology.  Continue medications.  ?  Orders:  amLODIPine, See Instructions, TAKE 1 TABLET BY MOUTH EVERY DAY, # 90 tab(s), 3 Refill(s), eRx: [id number]   Problem List/Past Medical History  Ongoing  CVA (cerebral vascular accident)  Depression  Diabetes  Diabetes  Glaucoma  HLD (hyperlipidemia)  HTN (hypertension)  HTN (hypertension)  Seizure disorder  Seizures  Type 2 diabetes mellitus  Historical  No qualifying data  Procedure/Surgical History  Total hysterectomy (06/01/1983)  COMPLETE HYST.   Medications  amlodipine 10 mg oral tablet, See Instructions, 3 refills  Aspir-Low 81 mg oral delayed release tablet, 81 mg= 1 tab(s), Oral, Daily  atorvastatin 20 mg oral tablet, See Instructions, 1 refills  blood pressure cuff, See Instructions  brimonidine 0.2% ophthalmic solution, 1 drop(s), Eye-Both, TID, 1 refills  Bystolic 5 mg oral tablet, 5 mg= 1 tab(s), Oral, Daily, 1 refills  Cosopt 2.23%-0.68% ophthalmic solution, 1 drop(s), Eye-Both, BID, 11 refills  glucometer and supplies, See Instructions  hydrochlorothiazide 12.5 mg oral tablet, 12.5 mg= 1 tab(s), Oral, Daily, 3 refills  latanoprost 0.005% ophthalmic solution, 1 drop(s), Eye-Both, qPM, 1 refills  levetiracetam 500 mg oral tablet, See Instructions, 2 refills  lisinopril 40 mg oral tablet, See Instructions, 3 refills  metformin 500 mg oral tablet, See Instructions, 1 refills  Plavix 75 mg oral tablet, 75 mg= 1 tab(s), Oral, Daily, 3 refills  venlafaxine 75 mg oral capsule, extended release, See Instructions  venlafaxine 75 mg oral capsule, extended release, See Instructions,? ?Not taking: Last Dose Date/Time  Unknown  Allergies  No Known Medication Allergies  Social History  Alcohol - Denies Alcohol Use, 12/07/2015  Never, 04/19/2016  Employment/School  Unemployed, Highest education level: High school., 02/29/2016  Exercise  Exercise duration: 240. Exercise frequency: 5-6 times/week. Self assessment: Fair condition. Exercise type: stationary bike 50 miles per week., 09/25/2017  Home/Environment  Lives with Alone. Living situation: Home/Independent. Home equipment: Glucose monitoring, bp cuff. Alcohol abuse in household: No. Substance abuse in household: No. Smoker in household: No. Feels unsafe at home: No. Safe place to go: Yes. Family/Friends available for support: Yes., 02/29/2016  Nutrition/Health  Regular, low carb, ;low salt. high fiber, low fat, Caffeine intake amount: diet soda once a week. Wants to lose weight: Yes. Sleeping concerns: No. Feels highly stressed: No., 02/29/2016  Sexual  Gender Identity Identifies as female., 06/26/2019  Substance Use - Denies Substance Abuse, 12/07/2015  Never, 05/30/2016  Tobacco - Denies Tobacco Use, 12/07/2015  Never (less than 100 in lifetime), N/A, 06/26/2019  Family History  Diabetes mellitus type 2: Mother.  Glaucoma.: Mother.  Immunizations  Vaccine Date Status   tetanus/diphtheria/pertussis, acel(Tdap) 08/28/2017 Given   influenza virus vaccine, inactivated 11/30/2016 Given   pneumococcal 23-polyvalent vaccine 11/30/2016 Given   Health Maintenance  Health Maintenance  ???Pending?(in the next year)  ??? ??OverDue  ??? ? ? ?Coronary Artery Disease Maintenance-Antiplatelet Agent Prescribed due??and every?  ??? ? ? ?Coronary Artery Disease Maintenance-Lipid Lowering Therapy due??and every?  ??? ? ? ?Alcohol Misuse Screening due??01/01/19??and every 1??year(s)  ??? ? ? ?Aspirin Therapy for CVD Prevention due??05/03/19??and every 1??year(s)  ??? ? ? ?Diabetes Maintenance-Foot Exam due??08/02/19??and every 1??year(s)  ??? ? ? ?Diabetes Maintenance-Fasting Lipid Profile  due??08/03/19??and every 1??year(s)  ??? ? ? ?Hypertension Management-BMP due??08/03/19??and every 1??year(s)  ??? ? ? ?Diabetes Maintenance-Serum Creatinine due??08/03/19??and every 1??year(s)  ??? ? ? ?Diabetes Maintenance-Urine Dipstick due??08/03/19??and every 1??year(s)  ??? ??Due?  ??? ? ? ?Diabetes Maintenance-Microalbumin due??08/03/19??and every 1??year(s)  ??? ? ? ?ADL Screening due??08/06/19??and every 1??year(s)  ??? ? ? ?Influenza Vaccine due??08/06/19??and every?  ??? ??Due In Future?  ??? ? ? ?Obesity Screening not due until??01/01/20??and every 1??year(s)  ??? ? ? ?Colorectal Screening not due until??02/24/20??and every 1??year(s)  ??? ? ? ?Diabetes Maintenance-HgbA1c not due until??02/28/20??and every 1??year(s)  ??? ? ? ?Diabetes Maintenance-Eye Exam not due until??06/25/20??and every 1??year(s)  ??? ? ? ?Blood Pressure Screening not due until??08/05/20??and every 1??year(s)  ??? ? ? ?Body Mass Index Check not due until??08/05/20??and every 1??year(s)  ??? ? ? ?Hypertension Management-Blood Pressure not due until??08/05/20??and every 1??year(s)  ???Satisfied?(in the past 1 year)  ??? ??Satisfied?  ??? ? ? ?Blood Pressure Screening on??08/06/19.??Satisfied by Dana Cabrera  ??? ? ? ?Body Mass Index Check on??08/06/19.??Satisfied by Dana Cabrera  ??? ? ? ?Breast Cancer Screening on??09/17/18.??Satisfied by Savannah Urbina  ??? ? ? ?Colorectal Screening on??02/24/19.??Satisfied by Kael Aranda  ??? ? ? ?Coronary Artery Disease Maintenance-Antiplatelet Agent Prescribed on??05/20/19.??Satisfied by Rosaura Rodrigues MD  ??? ? ? ?Depression Screening on??08/06/19.??Satisfied by Dana Cabrera  ??? ? ? ?Diabetes Maintenance-Eye Exam on??06/26/19.??Satisfied by Ruth Flynn  ??? ? ? ?Diabetes Screening on??02/28/19.??Satisfied by Isabella Yusuf  ??? ? ? ?Hypertension Management-Blood Pressure on??08/06/19.??Satisfied by Dana Cabrera  ??? ? ? ?Obesity Screening  on??08/06/19.??Satisfied by Dana Cabrera  ?     [1]?[facility]; Yvonne Adkins MD 02/28/2019 13:14 CST  [2] [facility]; Yvonne Adkins MD 02/28/2019 13:14 CST

## 2022-05-02 NOTE — HISTORICAL [facility]ER
This is a historical note converted from Laura. Formatting and pictures may have been removed.  Please reference Laura for original formatting and attached multimedia. Chief Complaint  f/u  History of Present Illness  A 59 year old -American female with history of non-insulin diabetes mellitus type II and CVA presents to clinic for a follow up appointment.  Patient was previously seen by another provider at this clinic.  Last hemoglobin A1c 5.0, currently on Metformin 500 mg daily.  She currently follows Neurology due to history of stroke, currently on Plavix and aspirin. No new symptoms. Patient has residual symptoms: expressive aphasia.  She reports that she had seizures in the past and was placed on Keppra by Neurology. She states that she stopped taking the medication and started having seizures. She started the medication again and reports she has not had a seizure in?almost three?years previously.  She denies chest pain, shortness of breath, nausea, vomiting, loss of appetite, weight loss, polyuria, or polydipsia.  Review of Systems  Constitutional: No fever, No chills, No sweats, No weakness, No fatigue, No decreased activity.  ?????Eye: No recent visual problem, No icterus, No double vision.  ?????Ear/Nose/Mouth/Throat: No nasal congestion, No sore throat.  ?????Respiratory: No shortness of breath, No cough, No sputum production, No hemoptysis, No wheezing, No cyanosis.  ?????Cardiovascular: No chest pain, No palpitations, No peripheral edema, No syncope.  ?????Gastrointestinal: No nausea, No vomiting, No diarrhea, No constipation, No hematemesis.  ?????Genitourinary: No dysuria, No hematuria, No change in urine stream, No urethral discharge.  ?????Hematology/Lymphatics: No bruising tendency, No bleeding tendency.  ?????Endocrine: No polyuria, No cold intolerance, No heat intolerance.  ?????Immunologic: Not immunocompromised, No recurrent fevers.  ?????Musculoskeletal: No joint pain, No  claudication.  ?????Integumentary: No rash, No pruritus, No abrasions, No petechiae.  ?????Neurologic: Alert and oriented X4, No abnormal balance, No numbness, No tingling.  ?????Psychiatric: No anxiety, depression, Not suicidal, Not delusional, No hallucinations. [1]  Physical Exam  Vitals & Measurements  T:?37.1? ?C (Oral)? HR:?59(Peripheral)? RR:?20? BP:?104/69?  HT:?170.00?cm? WT:?65.500?kg? BMI:?22.66?  General: Alert and oriented, No acute distress.  ?????Eye: Pupils are equal, round and reactive to light, Extraocular movements are intact, Normal conjunctiva, Vision unchanged.  ?????HENT: Normocephalic, Normal hearing, Oral mucosa is moist.  ?????Neck: Supple, No carotid bruit, No jugular venous distention, No thyromegaly.  ?????Respiratory: Lungs are clear to auscultation, Respirations are non-labored, Breath sounds are equal, Symmetrical chest wall expansion, No chest wall tenderness.  ?????Cardiovascular: Normal rate, Regular rhythm, No murmur, No gallop, Good pulses equal in all extremities, Normal peripheral perfusion, No edema.  ?????Gastrointestinal: Soft, Non-tender, Non-distended, Normal bowel sounds.  ?????Genitourinary: No costovertebral angle tenderness, No suprapubic tenderness.  ?????Musculoskeletal: Normal range of motion, Normal strength, No swelling, No deformity, Normal gait.  ?????Integumentary: Warm, No pallor, No rash.  ?????Neurologic: Alert, Oriented, Normal sensory, Normal motor function, No focal deficits, Cranial Nerves II-XII are grossly intact, Monofilament examination: No sensory loss on plantar or dorsal surface of feet bilaterally.  ?????Psychiatric: Cooperative, Appropriate mood & affect.  ???? Foot examination: No ulcer or skin breakage on plantar or dorsal surface bilaterally. Dorsalis pedis pulse palpated bilaterally.  Assessment/Plan  1.?Type 2 diabetes mellitus?E11.9  Hemoglobin A1c 08/2019 was 5.2.  Repeat hemoglobin A1c today.  Currently on metformin 250 mg daily,  continue.  Discussion with patient about compliance of medication and lifestyle modifications. ?Patient hemoglobin A1c continues to be low, will discontinue medication.  Monofilament examination was normal 11/2019, next screening 11/2020.  Ordered:  Clinic Follow up, *Est. 11/08/20 3:00:00 CST, Order for future visit, Type 2 diabetes mellitus  Chronic kidney disease, stage II (mild)  History of CVA in adulthood  HTN (hypertension)  HLD (hyperlipidemia)  Seizure disorder  Glaucoma  Healthcare maintenance, ...  Creatinine Urine, Routine collect, Urine, Order for future visit, *Est. 09/08/20 3:00:00 CDT, *Est. Stop date 09/08/20 3:00:00 CDT, Nurse collect, Type 2 diabetes mellitus, 09/08/20 13:19:00 CDT  Hemoglobin A1C [facility], Routine collect, *Est. 09/08/20 3:00:00 CDT, Blood, Order for future visit, *Est. Stop date 09/08/20 3:00:00 CDT, Lab Collect, Type 2 diabetes mellitus, 09/08/20 13:19:00 CDT  Microalbum/Creatinine Ratio Urine (Microalb/Creat), Routine collect, Urine, Order for future visit, *Est. 09/08/20 3:00:00 CDT, *Est. Stop date 09/08/20 3:00:00 CDT, Nurse collect, Type 2 diabetes mellitus  Microalbum/Creatinine Ratio Urine (Microalb/Creat), Routine collect, Urine, Order for future visit, *Est. 09/08/20 3:00:00 CDT, *Est. Stop date 09/08/20 3:00:00 CDT, Nurse collect, Type 2 diabetes mellitus  Office/Outpatient Visit Level 4 Established [id number] PC, Type 2 diabetes mellitus  Chronic kidney disease, stage II (mild)  History of CVA in adulthood  HTN (hypertension)  HLD (hyperlipidemia)  Seizure disorder  Glaucoma  Healthcare maintenance, [facility] Int [facility] C, 09/08/20 13:28:00 CDT  Urinalysis with Microscopic if Indicated, Routine collect, Urine, Order for future visit, *Est. 09/08/20 3:00:00 CDT, *Est. Stop date 09/08/20 3:00:00 CDT, Nurse collect, Type 2 diabetes mellitus  ?  2.?Chronic kidney disease, stage II (mild)?N18.2  GFR 66  Long discussion about avoiding nephrotoxic drugs  Increase water  intake  US retroperitoneum [2]  Ordered:  CBC w/ Auto Diff, Routine collect, *Est. 09/08/20 3:00:00 CDT, Blood, Order for future visit, *Est. Stop date 09/08/20 3:00:00 CDT, Lab Collect, Chronic kidney disease, stage II (mild), 09/08/20 13:19:00 CDT  Clinic Follow up, *Est. 11/08/20 3:00:00 CST, Order for future visit, Type 2 diabetes mellitus  Chronic kidney disease, stage II (mild)  History of CVA in adulthood  HTN (hypertension)  HLD (hyperlipidemia)  Seizure disorder  Glaucoma  Healthcare maintenance, ...  Comprehensive Metabolic Panel, Routine collect, *Est. 09/08/20 3:00:00 CDT, Blood, Order for future visit, *Est. Stop date 09/08/20 3:00:00 CDT, Lab Collect, Chronic kidney disease, stage II (mild), 09/08/20 13:19:00 CDT  Office/Outpatient Visit Level 4 Established [id number] PC, Type 2 diabetes mellitus  Chronic kidney disease, stage II (mild)  History of CVA in adulthood  HTN (hypertension)  HLD (hyperlipidemia)  Seizure disorder  Glaucoma  Healthcare maintenance, [facility] Int [facility] C, 09/08/20 13:28:00 CDT  ?  3.?History of CVA in adulthood?Z86.73  ?History of stroke.  Expressive aphasia  Currently following with neurology, continue.  Plavix and aspirin. [3]  Ordered:  Clinic Follow up, *Est. 11/08/20 3:00:00 CST, Order for future visit, Type 2 diabetes mellitus  Chronic kidney disease, stage II (mild)  History of CVA in adulthood  HTN (hypertension)  HLD (hyperlipidemia)  Seizure disorder  Glaucoma  Healthcare maintenance, ...  Office/Outpatient Visit Level 4 Established [id number] PC, Type 2 diabetes mellitus  Chronic kidney disease, stage II (mild)  History of CVA in adulthood  HTN (hypertension)  HLD (hyperlipidemia)  Seizure disorder  Glaucoma  Healthcare maintenance, [facility] Int [facility], 09/08/20 13:28:00 CDT  ?  4.?HTN (hypertension)?I10  ?Blood pressure 109/80, stable  Continue medications [4]  Ordered:  Clinic Follow up, *Est. 11/08/20 3:00:00 CST, Order for future visit, Type 2 diabetes  mellitus  Chronic kidney disease, stage II (mild)  History of CVA in adulthood  HTN (hypertension)  HLD (hyperlipidemia)  Seizure disorder  Glaucoma  Healthcare maintenance, ...  Office/Outpatient Visit Level 4 Established [id number] PC, Type 2 diabetes mellitus  Chronic kidney disease, stage II (mild)  History of CVA in adulthood  HTN (hypertension)  HLD (hyperlipidemia)  Seizure disorder  Glaucoma  Healthcare maintenance, [facility] Int [facility], 09/08/20 13:28:00 CDT  ?  5.?HLD (hyperlipidemia)?E78.5  ?Continue statin. [5]  Ordered:  Clinic Follow up, *Est. 11/08/20 3:00:00 CST, Order for future visit, Type 2 diabetes mellitus  Chronic kidney disease, stage II (mild)  History of CVA in adulthood  HTN (hypertension)  HLD (hyperlipidemia)  Seizure disorder  Glaucoma  Healthcare maintenance, ...  Lipid Panel, Routine collect, *Est. 09/08/20 3:00:00 CDT, Blood, Order for future visit, *Est. Stop date 09/08/20 3:00:00 CDT, Lab Collect, HLD (hyperlipidemia), 09/08/20 13:19:00 CDT  Office/Outpatient Visit Level 4 Established [id number] PC, Type 2 diabetes mellitus  Chronic kidney disease, stage II (mild)  History of CVA in adulthood  HTN (hypertension)  HLD (hyperlipidemia)  Seizure disorder  Glaucoma  Healthcare maintenance, [facility] Int [facility], 09/08/20 13:28:00 CDT  ?  6.?Seizure disorder?G40.909  ?Continue Keppra [6]  Ordered:  Clinic Follow up, *Est. 11/08/20 3:00:00 CST, Order for future visit, Type 2 diabetes mellitus  Chronic kidney disease, stage II (mild)  History of CVA in adulthood  HTN (hypertension)  HLD (hyperlipidemia)  Seizure disorder  Glaucoma  Healthcare maintenance, ...  Office/Outpatient Visit Level 4 Established [id number] PC, Type 2 diabetes mellitus  Chronic kidney disease, stage II (mild)  History of CVA in adulthood  HTN (hypertension)  HLD (hyperlipidemia)  Seizure disorder  Glaucoma  Healthcare maintenance, [facility], 09/08/20 13:28:00  CDT  ?  7.?Glaucoma?H40.9  ?Continue to follow ophthalmology.  Continue medications.? [7]  Ordered:  Clinic Follow up, *Est. 11/08/20 3:00:00 CST, Order for future visit, Type 2 diabetes mellitus  Chronic kidney disease, stage II (mild)  History of CVA in adulthood  HTN (hypertension)  HLD (hyperlipidemia)  Seizure disorder  Glaucoma  Healthcare maintenance, ...  Office/Outpatient Visit Level 4 Established [id number] PC, Type 2 diabetes mellitus  Chronic kidney disease, stage II (mild)  History of CVA in adulthood  HTN (hypertension)  HLD (hyperlipidemia)  Seizure disorder  Glaucoma  Healthcare maintenance, [facility] Int [facility], 09/08/20 13:28:00 CDT  ?  Orders:  amLODIPine, See Instructions, TAKE 1 TABLET BY MOUTH EVERY DAY, # 90 tab(s), 3 Refill(s), [id number], 170, cm, Height/Length Dosing, 09/08/20 12:49:00 CDT, 65.5, kg, Weight Dosing, 09/08/20 12:49:00 CDT  atorvastatin, See Instructions, TAKE 1 TABLET BY MOUTH EVERY DAY, # 90 tab(s), 2 Refill(s), Pharmacy: [facility]/pharmacy #[id number], 170, cm, Height/Length Dosing, 09/08/20 12:49:00 CDT, 65.5, kg, Weight Dosing, 09/08/20 12:49:00 CDT  clopidogrel, See Instructions, TAKE 1 TABLET BY MOUTH EVERY DAY, # 90 tab(s), 3 Refill(s), Pharmacy: [facility]/pharmacy #[id number], 170, cm, Height/Length Dosing, 09/08/20 12:49:00 CDT, 65.5, kg, Weight Dosing, 09/08/20 12:49:00 CDT  hydrochlorothiazide, 12.5 mg = 1 tab(s), Oral, Daily, # 90 tab(s), 3 Refill(s), Pharmacy: [facility]/pharmacy #[id number], 170, cm, Height/Length Dosing, 09/08/20 12:49:00 CDT, 65.5, kg, Weight Dosing, 09/08/20 12:49:00 CDT  levETIRAcetam, 500 mg = 1 tab(s), Oral, BID, # 180 tab(s), 2 Refill(s), Pharmacy: [facility]/pharmacy #[id number], 170, cm, Height/Length Dosing, 09/08/20 12:49:00 CDT, 65.5, kg, Weight Dosing, 09/08/20 12:49:00 CDT  lisinopril, See Instructions, TAKE 1 TABLET BY MOUTH EVERY DAY, # 90 tab(s), 0 Refill(s), Pharmacy: [facility]/pharmacy #[id number], 170, cm, Height/Length Dosing, 09/08/20 12:49:00 CDT, 65.5, kg,  Weight Dosing, 09/08/20 12:49:00 CDT  metFORMIN, See Instructions, TAKE 1 TABLET BY MOUTH EVERY DAY, # 90 tab(s), 2 Refill(s), [id number] 170, cm, Height/Length Dosing, 09/08/20 12:49:00 CDT, 65.5, kg, Weight Dosing, 09/08/20 12:49:00 CDT  nebivolol, 5 mg = 1 tab(s), Oral, Daily, # 90 tab(s), 1 Refill(s), [id number], 170, cm, Height/Length Dosing, 09/08/20 12:49:00 CDT, 65.5, kg, Weight Dosing, 09/08/20 12:49:00 CDT  venlafaxine, 75 mg = 1 cap(s), Oral, Daily, # 90 cap(s), 2 Refill(s), [id number], 170, cm, Height/Length Dosing, 09/08/20 12:49:00 CDT, 65.5, kg, Weight Dosing, 09/08/20 12:49:00 CDT  Free T4, Routine collect, *Est. 09/08/20 3:00:00 CDT, Blood, Order for future visit, *Est. Stop date 09/08/20 3:00:00 CDT, Lab Collect, Healthcare maintenance, 09/08/20 13:19:00 CDT  Thyroid Stimulating Hormone, Routine collect, *Est. 09/08/20 3:00:00 CDT, Blood, Order for future visit, *Est. Stop date 09/08/20 3:00:00 CDT, Lab Collect, Healthcare maintenance, 09/08/20 13:19:00 CDT  Vitamin D, 25-Hydroxy Level, Routine collect, *Est. 09/08/20 3:00:00 CDT, Blood, Order for future visit, *Est. Stop date 09/08/20 3:00:00 CDT, Lab Collect, Healthcare maintenance, 09/08/20 13:20:00 CDT  Referrals  [facility] Internal Referral to Gynecology Clinic, Specialty: Gynecology, Reason: Annual Examination, Start: 09/08/20 13:22:00 CDT  [facility] Internal Referral to Ophthalmology Fundus Clinic, Specialty: Ophthalmology, Reason: Annual diabetic fundus photo, Start: 09/08/20 13:20:00 CDT  Clinic Follow up, *Est. 11/08/20 3:00:00 CST, Order for future visit, Type 2 diabetes mellitus  Chronic kidney disease, stage II (mild)  History of CVA in adulthood  HTN (hypertension)  HLD (hyperlipidemia)  Seizure disorder  Glaucoma  Healthcare maintenance, ...   Problem List/Past Medical History  Ongoing  CVA (cerebrovascular accident)  Depression  Diabetes  Diabetes  Glaucoma  History of CVA in  adulthood  HLD (hyperlipidemia)  HTN (hypertension)  HTN (hypertension)  Primary open angle glaucoma (POAG) of both eyes, severe stage  Seizure disorder  Seizures  Type 2 diabetes mellitus  Historical  No qualifying data  Procedure/Surgical History  Total hysterectomy (06/01/1983)  COMPLETE HYST.   Medications  amlodipine 10 mg oral tablet, See Instructions, 3 refills  Aspir-Low 81 mg oral delayed release tablet, 81 mg= 1 tab(s), Oral, Daily  atorvastatin 20 mg oral tablet, See Instructions, 2 refills  brimonidine 0.2% ophthalmic solution, 1 drop(s), Eye-Both, TID, 1 refills  Bystolic 5 mg oral tablet, 5 mg= 1 tab(s), Oral, Daily, 1 refills  clopidogrel 75 mg oral tablet, See Instructions, 3 refills  Cosopt 2.23%-0.68% ophthalmic solution, 1 drop(s), Eye-Both, BID, 11 refills  hydrochlorothiazide 12.5 mg oral tablet, 12.5 mg= 1 tab(s), Oral, Daily, 3 refills  Keppra 500 mg oral tablet, 500 mg= 1 tab(s), Oral, BID, 2 refills  latanoprost 0.005% ophthalmic solution, See Instructions, 11 refills  latanoprost 0.005% ophthalmic solution, See Instructions  lisinopril 40 mg oral tablet, See Instructions  metformin 500 mg oral tablet, See Instructions, 2 refills  True Metrix Lancets 30G, See Instructions, 6 refills  venlafaxine 75 mg oral capsule, extended release, 75 mg= 1 cap(s), Oral, Daily, 2 refills  Allergies  No Known Allergies  No Known Medication Allergies  Social History  Abuse/Neglect  No, 09/08/2020  Alcohol - Denies Alcohol Use, 12/07/2015  Never, 02/21/2020  Employment/School  Unemployed, Highest education level: High school., 02/29/2016  Exercise  Exercise duration: 240. Exercise frequency: 5-6 times/week. Self assessment: Fair condition. Exercise type: stationary bike 50 miles per week., 09/25/2017  Home/Environment  Lives with Alone. Living situation: Home/Independent. Home equipment: Glucose monitoring, bp cuff. Alcohol abuse in household: No. Substance abuse in household: No. Smoker in household: No.  Feels unsafe at home: No. Safe place to go: Yes. Family/Friends available for support: Yes., 02/29/2016  Nutrition/Health  Regular, low carb, ;low salt. high fiber, low fat, Caffeine intake amount: diet soda once a week. Wants to lose weight: Yes. Sleeping concerns: No. Feels highly stressed: No., 02/29/2016  Sexual  Gender Identity Identifies as female., 06/26/2019  Spiritual/Cultural  Gnosticist, 09/08/2020  Substance Use - Denies Substance Abuse, 12/07/2015  Never, 02/21/2020  Tobacco - Denies Tobacco Use, 12/07/2015  Never (less than 100 in lifetime), N/A, 09/08/2020  Family History  Diabetes mellitus type 2: Mother.  Glaucoma.: Mother.  Immunizations  Vaccine Date Status   tetanus/diphtheria/pertussis, acel(Tdap) 08/28/2017 Given   influenza virus vaccine, inactivated 11/30/2016 Given   pneumococcal 23-polyvalent vaccine 11/30/2016 Given   Health Maintenance  Health Maintenance  ???Pending?(in the next year)  ??? ??OverDue  ??? ? ? ?Coronary Artery Disease Maintenance-Antiplatelet Agent Prescribed due??and every?  ??? ? ? ?Diabetes Maintenance-Microalbumin due??and every?  ??? ? ? ?Influenza Vaccine due??and every?  ??? ? ? ?Aspirin Therapy for CVD Prevention due??05/03/19??and every 1??year(s)  ??? ? ? ?Diabetes Maintenance-Foot Exam due??08/02/19??and every 1??year(s)  ??? ? ? ?Alcohol Misuse Screening due??01/02/20??and every 1??year(s)  ??? ? ? ?Colorectal Screening due??02/24/20??and every 1??year(s)  ??? ? ? ?Diabetes Maintenance-Fasting Lipid Profile due??08/06/20??and every 1??year(s)  ??? ??Due?  ??? ? ? ?Medicare Annual Wellness Exam due??09/08/20??and every 1??year(s)  ??? ? ? ?Zoster Vaccine due??09/08/20??and every?  ??? ??Due In Future?  ??? ? ? ?Diabetes Maintenance-Eye Exam not due until??10/22/20??and every 1??year(s)  ??? ? ? ?Diabetes Maintenance-HgbA1c not due until??11/04/20??and every 1??year(s)  ??? ? ? ?Hypertension Management-BMP not due until??11/04/20??and every 1??year(s)  ??? ? ?  ?Diabetes Maintenance-Serum Creatinine not due until??11/05/20??and every 1??year(s)  ??? ? ? ?Obesity Screening not due until??01/01/21??and every 1??year(s)  ???Satisfied?(in the past 1 year)  ??? ??Satisfied?  ??? ? ? ?ADL Screening on??09/08/20.??Satisfied by Britney Lemus LPN  ??? ? ? ?Blood Pressure Screening on??09/08/20.??Satisfied by Britney Lemus LPN  ??? ? ? ?Body Mass Index Check on??09/08/20.??Satisfied by Britney Lemus LPN  ??? ? ? ?Breast Cancer Screening on??09/19/19.??Satisfied by Kristal Whitaker  ??? ? ? ?Coronary Artery Disease Maintenance-Antiplatelet Agent Prescribed on??09/08/20.??Satisfied by Yvonne Adkins MD  ??? ? ? ?Depression Screening on??09/08/20.??Satisfied by Britney Lemus LPN  ??? ? ? ?Diabetes Maintenance-Serum Creatinine on??11/05/19.??Satisfied by Pam Alaniz  ??? ? ? ?Diabetes Screening on??11/05/19.??Satisfied by Ubaldo Salcedo  ??? ? ? ?Hypertension Management-Blood Pressure on??09/08/20.??Satisfied by Britney Lemus LPN  ??? ? ? ?Obesity Screening on??09/08/20.??Satisfied by Britney Lemus LPN  ??? ??Refused?  ??? ? ? ?Zoster Vaccine on??09/08/20.??Recorded by Britney Lemus LPN??Reason: Patient Refuses  ?     [1]?[facility]; Yvonne Adkins MD 02/04/2020 14:44 CST  [2]?[facility]; Yvonne Adkins MD 02/04/2020 14:44 CST  [3]?[facility]; Yvonne Adkins MD 02/04/2020 14:44 CST  [4]?[facility]; Jed LIZARRAGA, Yvonne 02/04/2020 14:44 CST  [5]?[facility]; Jed LIZARRAGA, Yvonne 02/04/2020 14:44 CST  [6]?[facility]; Jed LIZARRAGA, Yvonne 02/04/2020 14:44 CST  [7]?[facility]; Jed LIZARRAGA, Yvonne 02/04/2020 14:44 CST

## 2022-05-03 ENCOUNTER — OFFICE VISIT (OUTPATIENT)
Dept: OPHTHALMOLOGY | Facility: CLINIC | Age: 62
End: 2022-05-03
Payer: MEDICARE

## 2022-05-03 VITALS — HEIGHT: 66 IN | BODY MASS INDEX: 23.03 KG/M2 | WEIGHT: 143.31 LBS

## 2022-05-03 DIAGNOSIS — H25.13 AGE-RELATED NUCLEAR CATARACT OF BOTH EYES: Primary | ICD-10-CM

## 2022-05-03 DIAGNOSIS — H40.1113 PRIMARY OPEN ANGLE GLAUCOMA (POAG) OF RIGHT EYE, SEVERE STAGE: ICD-10-CM

## 2022-05-03 DIAGNOSIS — H40.1122 PRIMARY OPEN ANGLE GLAUCOMA (POAG) OF LEFT EYE, MODERATE STAGE: ICD-10-CM

## 2022-05-03 PROCEDURE — 99213 OFFICE O/P EST LOW 20 MIN: CPT | Mod: PBBFAC,PO | Performed by: OPHTHALMOLOGY

## 2022-05-03 RX ORDER — BRIMONIDINE TARTRATE 1.5 MG/ML
1 SOLUTION/ DROPS OPHTHALMIC 3 TIMES DAILY
COMMUNITY
End: 2023-08-28 | Stop reason: SDUPTHER

## 2022-05-03 RX ORDER — METFORMIN HYDROCHLORIDE 1000 MG/1
1000 TABLET ORAL 2 TIMES DAILY WITH MEALS
COMMUNITY
End: 2022-10-14 | Stop reason: SDUPTHER

## 2022-05-03 RX ORDER — DORZOLAMIDE HYDROCHLORIDE AND TIMOLOL MALEATE 20; 5 MG/ML; MG/ML
1 SOLUTION/ DROPS OPHTHALMIC 2 TIMES DAILY
COMMUNITY
End: 2022-12-07 | Stop reason: SDUPTHER

## 2022-05-03 RX ORDER — LATANOPROST 50 UG/ML
1 SOLUTION/ DROPS OPHTHALMIC NIGHTLY
COMMUNITY
End: 2022-12-07 | Stop reason: SDUPTHER

## 2022-05-03 RX ORDER — CLOPIDOGREL 300 MG/1
300 TABLET, FILM COATED ORAL DAILY
COMMUNITY
End: 2022-10-07

## 2022-05-03 RX ORDER — ASPIRIN 325 MG/1
325 TABLET, FILM COATED ORAL DAILY
COMMUNITY
End: 2022-10-07

## 2022-05-03 RX ORDER — VENLAFAXINE 100 MG/1
TABLET ORAL 2 TIMES DAILY
COMMUNITY
End: 2022-10-07

## 2022-05-03 RX ORDER — LEVETIRACETAM 1000 MG/1
1000 TABLET ORAL 2 TIMES DAILY
COMMUNITY
End: 2022-05-26 | Stop reason: SDUPTHER

## 2022-05-03 RX ORDER — HYDROCHLOROTHIAZIDE 12.5 MG/1
12.5 TABLET ORAL DAILY
COMMUNITY
End: 2022-11-01 | Stop reason: SDUPTHER

## 2022-05-03 RX ORDER — LISINOPRIL 10 MG/1
10 TABLET ORAL DAILY
COMMUNITY
End: 2022-05-24 | Stop reason: SDUPTHER

## 2022-05-03 RX ORDER — ATORVASTATIN CALCIUM 20 MG/1
20 TABLET, FILM COATED ORAL DAILY
COMMUNITY
End: 2022-10-17 | Stop reason: SDUPTHER

## 2022-05-03 RX ORDER — AMLODIPINE BESYLATE 10 MG/1
10 TABLET ORAL DAILY
COMMUNITY
End: 2022-11-01 | Stop reason: SDUPTHER

## 2022-05-03 NOTE — PROGRESS NOTES
HPI     Glaucoma      Additional comments: IOP Check              Comments     Here for IOP check OU          Last edited by Ashley Cortez MA on 5/3/2022 12:36 PM. (History)            Assessment /Plan     For exam results, see Encounter Report.    There are no diagnoses linked to this encounter.    1. POAG, Right Eye, Severe  2. POAG, Left Eye, Moderate  - + FH, thin pachy  - gonio 2/3/22 open 360 OU  - target pressure 13 // 13, unknown tmax  - OCT RNFL 08/21 45 all red // 76 yellow I, rest green, stable  - HVF stable w superior hemifield defect OD and scattered non-specific defects OS  - IOP good  - continue xalantan WQHS, cosopt BID, alphagan TID  - monocular precautions given, polycarbs dispensed      3. NSC OU  - NVS, CTM    4. DM II w/o Retinopathy  - last A1C 6.3%  - encouraged good control/regular PCP f/u  - yearly DFE due 08/2022    5. PVD OU  - RD precautions  - monitor     RTC 3 mo IOP, OCT RNFL, HVF, DFE

## 2022-05-24 RX ORDER — LISINOPRIL 10 MG/1
10 TABLET ORAL DAILY
Qty: 90 TABLET | Refills: 0 | Status: SHIPPED | OUTPATIENT
Start: 2022-05-24 | End: 2022-08-19 | Stop reason: SDUPTHER

## 2022-05-26 DIAGNOSIS — R56.9 SEIZURE: Primary | ICD-10-CM

## 2022-05-26 RX ORDER — CLOPIDOGREL BISULFATE 75 MG/1
75 TABLET ORAL DAILY
COMMUNITY
Start: 2022-04-29 | End: 2023-02-27 | Stop reason: SDUPTHER

## 2022-05-26 RX ORDER — VENLAFAXINE HYDROCHLORIDE 75 MG/1
75 CAPSULE, EXTENDED RELEASE ORAL DAILY
COMMUNITY
Start: 2022-05-21 | End: 2023-03-07 | Stop reason: SDUPTHER

## 2022-05-26 RX ORDER — BRIMONIDINE TARTRATE 2 MG/ML
SOLUTION/ DROPS OPHTHALMIC
COMMUNITY
Start: 2022-01-29 | End: 2022-12-07 | Stop reason: SDUPTHER

## 2022-05-26 NOTE — TELEPHONE ENCOUNTER
----- Message from Sarah Lewis sent at 5/26/2022  9:34 AM CDT -----  Regarding: Dr. Adkins/ Refill  Pt request medication refill on:    1. levETIRAcetam 500mg    Please Advise.    Thanks

## 2022-05-30 RX ORDER — LEVETIRACETAM 1000 MG/1
1000 TABLET ORAL 2 TIMES DAILY
Qty: 60 TABLET | Refills: 3 | Status: SHIPPED | OUTPATIENT
Start: 2022-05-30 | End: 2022-08-19 | Stop reason: SDUPTHER

## 2022-05-30 NOTE — TELEPHONE ENCOUNTER
Patient made aware rx. Refilled and sent to [facility] pharmacy. Patient verbalized understanding.

## 2022-08-02 ENCOUNTER — OFFICE VISIT (OUTPATIENT)
Dept: INTERNAL MEDICINE | Facility: CLINIC | Age: 62
End: 2022-08-02
Payer: MEDICARE

## 2022-08-02 VITALS
SYSTOLIC BLOOD PRESSURE: 105 MMHG | BODY MASS INDEX: 23.49 KG/M2 | HEIGHT: 67 IN | RESPIRATION RATE: 16 BRPM | TEMPERATURE: 98 F | WEIGHT: 149.69 LBS | DIASTOLIC BLOOD PRESSURE: 69 MMHG

## 2022-08-02 DIAGNOSIS — E11.10 TYPE 2 DIABETES MELLITUS WITH KETOACIDOSIS WITHOUT COMA, UNSPECIFIED WHETHER LONG TERM INSULIN USE: Primary | ICD-10-CM

## 2022-08-02 DIAGNOSIS — E11.22 TYPE 2 DIABETES MELLITUS WITH STAGE 2 CHRONIC KIDNEY DISEASE, WITHOUT LONG-TERM CURRENT USE OF INSULIN: ICD-10-CM

## 2022-08-02 DIAGNOSIS — I10 HYPERTENSION, UNSPECIFIED TYPE: ICD-10-CM

## 2022-08-02 DIAGNOSIS — H40.1113 PRIMARY OPEN ANGLE GLAUCOMA (POAG) OF RIGHT EYE, SEVERE STAGE: ICD-10-CM

## 2022-08-02 DIAGNOSIS — G40.909 SEIZURE DISORDER: ICD-10-CM

## 2022-08-02 DIAGNOSIS — H40.1122 PRIMARY OPEN ANGLE GLAUCOMA (POAG) OF LEFT EYE, MODERATE STAGE: ICD-10-CM

## 2022-08-02 DIAGNOSIS — N18.2 TYPE 2 DIABETES MELLITUS WITH STAGE 2 CHRONIC KIDNEY DISEASE, WITHOUT LONG-TERM CURRENT USE OF INSULIN: ICD-10-CM

## 2022-08-02 PROCEDURE — 99214 OFFICE O/P EST MOD 30 MIN: CPT | Mod: PBBFAC | Performed by: INTERNAL MEDICINE

## 2022-08-02 NOTE — PROGRESS NOTES
[facility] INTERNAL MEDICINE  OUTPATIENT OFFICE VISIT NOTE    SUBJECTIVE:    HPI: Alyssa Anderson is a 61 y.o. yo female non-insulin diabetes mellitus type II, and CVA that presents to clinic for a follow up appointment.  Patient was previously seen by another provider at this clinic.  Last hemoglobin A1c 5.1, currently on Metformin 500 mg daily.  She currently follows Neurology due to history of stroke, currently on Plavix and aspirin. No new symptoms. Patient has residual symptoms: expressive aphasia.  She reports that she had seizures in the past and was placed on Keppra by Neurology. She states that she stopped taking the medication and started having seizures. She started the medication again and reports she has not had a seizure in many years.  Patient denies chest pain, palpitations, and shortness of breath.   Patient denies fever, night sweats, chills, nausea, vomiting, diarrhea, constipation, weight loss, and changes in appetite.    Review of Systems:  Constitutional: No fever, No chills, No sweats, No weakness, No fatigue, No decreased activity.   Eye: No recent visual problem, No icterus, No double vision.  Ear/Nose/Mouth/Throat: No nasal congestion, No sore throat.   Respiratory: No shortness of breath, No cough, No sputum production, No hemoptysis,   No wheezing, No cyanosis.   Cardiovascular: No chest pain, No palpitations, No peripheral edema, No syncope.   Gastrointestinal: No nausea, No vomiting, No diarrhea, No constipation, No hematemesis.   Genitourinary: No dysuria, No hematuria, No change in urine stream, No urethral discharge.   Hematology/Lymphatics: No bruising tendency, No bleeding tendency.   Endocrine: No polyuria, No cold intolerance, No heat intolerance.   Immunologic: Not immunocompromised, No recurrent fevers.   Musculoskeletal: No joint pain, No claudication.   Integumentary: No rash, No pruritus, No abrasions, No petechiae.    Neurologic: Alert and oriented X4, No abnormal balance, No  "numbness, No tingling.   Psychiatric: No anxiety, no depression, Not suicidal, Not delusional, No hallucinations.     OBJECTIVE:    Vitals:   /69 (BP Location: Right arm, Patient Position: Sitting)   Temp 98.1 °F (36.7 °C) (Oral)   Resp 16   Ht 5' 7" (1.702 m)   Wt 67.9 kg (149 lb 11.1 oz)   BMI 23.45 kg/m²      Physical Exam:  General: Alert and oriented, No acute distress.   Eye: Pupils are equal, round and reactive to light, Extraocular movements are intact,   Normal conjunctiva, Vision unchanged.   HENT: Normocephalic, Normal hearing, Oral mucosa is moist.   Neck: Supple, No carotid bruit, No jugular venous distention, No thyromegaly.   Respiratory: Lungs are clear to auscultation, Respirations are non-labored, Breath sounds   are equal, Symmetrical chest wall expansion, No chest wall tenderness.   Cardiovascular: Normal rate, Regular rhythm, No murmur, No gallop, Good pulses equal in   All extremities, Normal peripheral perfusion, No edema.   Gastrointestinal: Soft, Non-tender, Non-distended, Normal bowel sounds.   Genitourinary: No costovertebral angle tenderness, No suprapubic tenderness.   Musculoskeletal: Normal range of motion, Normal strength, No swelling, No deformity, Normal gait.   Integumentary: Warm, No pallor, No rash.   Neurologic: Alert, Oriented, Normal sensory, Normal motor function, No focal deficits,   Cranial Nerves II-XII are grossly intact, Monofilament examination: No sensory loss on plantar   or dorsal surface of feet bilaterally.   Psychiatric: Cooperative, Appropriate mood & affect.     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 Diabetic fundus photos no retinopathy, last appointment with Ophthalmology 10/2021. " Monofilament examination was normal 4/2022, next screening 4/2023. Lifestyle modifications.  Avoid nephrotoxic drugs.  Continue medications. Patient reports systolic blood pressure 100-115.  Patient reports no new seizures. Continue Keppra. Patient is on 500 mg BID per last note. Continue to follow Neurology. Neurology has recommended to continue Plavix and Aspirin.  Completed prescription, recheck at a later date.  Controlled with Effexor, which she was started on by another provider.  Continue to follow up with Ophthalmology and continue eyedrops.      Return to clinic in 3 months.     Yvonne Adkins MD  [facility]

## 2022-08-03 ENCOUNTER — OFFICE VISIT (OUTPATIENT)
Dept: OPHTHALMOLOGY | Facility: CLINIC | Age: 62
End: 2022-08-03
Payer: MEDICARE

## 2022-08-03 VITALS — BODY MASS INDEX: 23.14 KG/M2 | HEIGHT: 66 IN | WEIGHT: 144 LBS

## 2022-08-03 DIAGNOSIS — H40.1122 PRIMARY OPEN ANGLE GLAUCOMA (POAG) OF LEFT EYE, MODERATE STAGE: ICD-10-CM

## 2022-08-03 DIAGNOSIS — E11.9 TYPE 2 DIABETES MELLITUS WITHOUT OPHTHALMIC MANIFESTATIONS: ICD-10-CM

## 2022-08-03 DIAGNOSIS — H40.1113 PRIMARY OPEN ANGLE GLAUCOMA (POAG) OF RIGHT EYE, SEVERE STAGE: Primary | ICD-10-CM

## 2022-08-03 DIAGNOSIS — H25.13 AGE-RELATED NUCLEAR CATARACT OF BOTH EYES: ICD-10-CM

## 2022-08-03 DIAGNOSIS — H53.15 VISUAL DISTORTIONS OF SHAPE AND SIZE: ICD-10-CM

## 2022-08-03 PROCEDURE — 92133 CPTRZD OPH DX IMG PST SGM ON: CPT | Mod: PBBFAC,PO | Performed by: OPHTHALMOLOGY

## 2022-08-03 PROCEDURE — 92133 CPTRZD OPH DX IMG PST SGM ON: CPT | Mod: PBBFAC,PO

## 2022-08-03 PROCEDURE — 92134 CPTRZ OPH DX IMG PST SGM RTA: CPT | Mod: PBBFAC,PO | Performed by: OPHTHALMOLOGY

## 2022-08-03 PROCEDURE — 92134 CPTRZ OPH DX IMG PST SGM RTA: CPT | Mod: PBBFAC,PO

## 2022-08-03 PROCEDURE — 99213 OFFICE O/P EST LOW 20 MIN: CPT | Mod: PBBFAC,PO

## 2022-08-03 PROCEDURE — 92083 EXTENDED VISUAL FIELD XM: CPT | Mod: PBBFAC,PO

## 2022-08-03 PROCEDURE — 92083 EXTENDED VISUAL FIELD XM: CPT | Mod: PBBFAC,PO | Performed by: OPHTHALMOLOGY

## 2022-08-03 RX ORDER — ASPIRIN 81 MG/1
81 TABLET ORAL DAILY
COMMUNITY
End: 2024-03-26 | Stop reason: SDUPTHER

## 2022-08-03 NOTE — PROGRESS NOTES
Testing  OCT Mac 8/3/22  - OD: 207, generalized retinal atrophy  - OS: 249, good foveal contour    OCT RNFL 8/3/22  - OD: 38um, red S/T/I, yellow N  - OS: 72um, red I, yellow N, rest green    HVF 8/3/22  - OD: 11/12 FL 38% FP 21% FN, unreliable dense SAD  - OS: 4/11 2% FP 0% FN, unreliable, nonspecific deficits    Assessment/Plan    1. POAG, Right Eye, Severe  2. POAG, Left Eye, Moderate  - + FH, thin pachy  - gonio 2/3/22 open 360 OU  - target pressure 13 // 13, unknown tmax  - OCT RNFL 08/21 45 all red // 76 yellow I, rest green. Increased thinning OS compared to 8/2021  - HVF stable w superior hemifield defect OD and scattered non-specific defects OS  - IOP good  - Continue xalantan WQHS, cosopt BID, brimonidine TID  - Monocular precautions given, polycarbs dispensed  - Given pt is monocular and likely progression OS despite max medical therapy, recommend returning on Vincent day to consider Xen stent placement vs SLT    3. NSC OU  - NVS, CTM    4. DM II w/o Retinopathy  - last A1C 6.3%  - encouraged good control/regular PCP f/u  - yearly DFE due 08/2022    5. PVD OU  - RD precautions  - monitor     RTC next Vincent day

## 2022-08-05 DIAGNOSIS — N18.2 TYPE 2 DIABETES MELLITUS WITH STAGE 2 CHRONIC KIDNEY DISEASE, WITHOUT LONG-TERM CURRENT USE OF INSULIN: Primary | ICD-10-CM

## 2022-08-05 DIAGNOSIS — E11.22 TYPE 2 DIABETES MELLITUS WITH STAGE 2 CHRONIC KIDNEY DISEASE, WITHOUT LONG-TERM CURRENT USE OF INSULIN: Primary | ICD-10-CM

## 2022-08-07 RX ORDER — METFORMIN HYDROCHLORIDE 1000 MG/1
1000 TABLET ORAL 2 TIMES DAILY WITH MEALS
Qty: 180 TABLET | Refills: 1 | OUTPATIENT
Start: 2022-08-07

## 2022-08-19 DIAGNOSIS — R56.9 SEIZURE: ICD-10-CM

## 2022-08-19 DIAGNOSIS — I10 HYPERTENSION, UNSPECIFIED TYPE: Primary | ICD-10-CM

## 2022-08-22 ENCOUNTER — TELEPHONE (OUTPATIENT)
Dept: ENDOSCOPY | Facility: HOSPITAL | Age: 62
End: 2022-08-22
Payer: MEDICARE

## 2022-08-22 RX ORDER — LISINOPRIL 10 MG/1
10 TABLET ORAL DAILY
Qty: 90 TABLET | Refills: 0 | Status: SHIPPED | OUTPATIENT
Start: 2022-08-22 | End: 2022-09-06

## 2022-08-22 RX ORDER — LEVETIRACETAM 1000 MG/1
500 TABLET ORAL 2 TIMES DAILY
Qty: 90 TABLET | Refills: 0 | Status: SHIPPED | OUTPATIENT
Start: 2022-08-22 | End: 2022-11-01 | Stop reason: SDUPTHER

## 2022-08-25 ENCOUNTER — OFFICE VISIT (OUTPATIENT)
Dept: OPHTHALMOLOGY | Facility: CLINIC | Age: 62
End: 2022-08-25
Payer: MEDICARE

## 2022-08-25 VITALS — BODY MASS INDEX: 23.14 KG/M2 | HEIGHT: 66 IN | WEIGHT: 144 LBS

## 2022-08-25 DIAGNOSIS — H40.1122 PRIMARY OPEN ANGLE GLAUCOMA (POAG) OF LEFT EYE, MODERATE STAGE: Primary | ICD-10-CM

## 2022-08-25 DIAGNOSIS — H40.1113 PRIMARY OPEN ANGLE GLAUCOMA (POAG) OF RIGHT EYE, SEVERE STAGE: ICD-10-CM

## 2022-08-25 PROCEDURE — 99213 OFFICE O/P EST LOW 20 MIN: CPT | Mod: PBBFAC,PO | Performed by: STUDENT IN AN ORGANIZED HEALTH CARE EDUCATION/TRAINING PROGRAM

## 2022-08-25 PROCEDURE — 92133 CPTRZD OPH DX IMG PST SGM ON: CPT | Mod: PBBFAC,PO | Performed by: STUDENT IN AN ORGANIZED HEALTH CARE EDUCATION/TRAINING PROGRAM

## 2022-08-25 NOTE — PROGRESS NOTES
HPI     Glaucoma      Additional comments: RTC Dany epperson to consider Xen stent placement vs   SLT          Last edited by Katt Otto, RN on 8/25/2022  2:28 PM. (History)            Assessment /Plan     For exam results, see Encounter Report.    Primary open angle glaucoma (POAG) of left eye, moderate stage    Primary open angle glaucoma (POAG) of right eye, severe stage        Testing  OCT RNFL 8/25/22  - OD: 50, all red  - OS: 76, yellow I otherwise green    OCT Mac 8/3/22  - OD: 207, generalized retinal atrophy  - OS: 249, good foveal contour     OCT RNFL 8/3/22  - OD: 38um, red S/T/I, yellow N  - OS: 72um, red I, yellow N, rest green     HVF 8/3/22  - OD: 11/12 FL 38% FP 21% FN, unreliable dense SAD  - OS: 4/11 2% FP 0% FN, unreliable, nonspecific deficits     Assessment/Plan     1. POAG, Right Eye, Severe  2. POAG, Left Eye, Moderate  - + FH, thin pachy  - gonio 2/3/22 open 360 OU  - target pressure 13 // 13, unknown tmax  - OCT RNFL 08/21 45 all red // 76 yellow I, rest green. Increased thinning OS compared to 8/2021  - HVF stable w superior hemifield defect OD and scattered non-specific defects OS  - IOP today 21//16  - Continue xalantan WQHS, cosopt BID, brimonidine TID  - Monocular precautions given, polycarbs dispensed  - OCT last visit with poor signal strength (8/3/22). Repeated OCT today and it is stable from 2021 in the left eye.   - At this time, will not proceed with SLT or xen gel OS. OD is comfort care. Patient denies any pain     3. NSC OU  - NVS, CTM     4. DM II w/o Retinopathy  Hemoglobin A1c   Date Value Ref Range Status   09/08/2020 5.4 4.2 - 6.3 % Final   11/05/2019 5.0 4.2 - 6.3 % Final   08/06/2019 5.2 4.2 - 6.3 % Final     - encouraged good control/regular PCP f/u  - yearly DFE due 08/2023     5. PVD OU  - RD precautions  - monitor      RTC 4 months for HVF   Patient d/w Dr. Saenz

## 2022-08-31 ENCOUNTER — TELEPHONE (OUTPATIENT)
Dept: ENDOSCOPY | Facility: HOSPITAL | Age: 62
End: 2022-08-31
Payer: MEDICARE

## 2022-09-01 ENCOUNTER — TELEPHONE (OUTPATIENT)
Dept: INTERNAL MEDICINE | Facility: CLINIC | Age: 62
End: 2022-09-01
Payer: MEDICARE

## 2022-09-01 NOTE — TELEPHONE ENCOUNTER
Dr Adkins,     Pt called and states that Lisinopril is the wrong strength. She states she received Lisinopril 10mg and she usually receives Lisinopril 40mg.    Pt wants to know if med has been changed.    Please advise.

## 2022-09-06 RX ORDER — LISINOPRIL 40 MG/1
40 TABLET ORAL DAILY
Qty: 1 TABLET | Refills: 2 | Status: SHIPPED | OUTPATIENT
Start: 2022-09-06 | End: 2022-11-01 | Stop reason: SDUPTHER

## 2022-09-07 ENCOUNTER — TELEPHONE (OUTPATIENT)
Dept: INTERNAL MEDICINE | Facility: CLINIC | Age: 62
End: 2022-09-07
Payer: MEDICARE

## 2022-09-07 NOTE — TELEPHONE ENCOUNTER
Dr Adkins,     [facility] needs clarification on Lisinopril. On rx it states that quantity is 1.     Do you want to change this to 30 or 90?    Please advise.

## 2022-09-13 DIAGNOSIS — Z12.11 COLON CANCER SCREENING: Primary | ICD-10-CM

## 2022-09-13 RX ORDER — POLYETHYLENE GLYCOL 3350, SODIUM SULFATE, SODIUM CHLORIDE, POTASSIUM CHLORIDE, SODIUM ASCORBATE, AND ASCORBIC ACID 7.5-2.691G
2000 KIT ORAL ONCE
Qty: 1 KIT | Refills: 0 | Status: SHIPPED | OUTPATIENT
Start: 2022-09-13 | End: 2022-09-13

## 2022-09-28 ENCOUNTER — TELEPHONE (OUTPATIENT)
Dept: INTERNAL MEDICINE | Facility: CLINIC | Age: 62
End: 2022-09-28
Payer: MEDICARE

## 2022-09-28 NOTE — TELEPHONE ENCOUNTER
Informed patient that she should hold Plavix 7 days before procedure. She can restart Plavix one day after the procedure. If anything changes due to polypectomy or if there is bleeding, the physician performing the procedure will inform her.     Yvonne Adkins MD  [facility]

## 2022-10-12 ENCOUNTER — ANESTHESIA EVENT (OUTPATIENT)
Dept: ENDOSCOPY | Facility: HOSPITAL | Age: 62
End: 2022-10-12
Payer: MEDICARE

## 2022-10-12 NOTE — ANESTHESIA PREPROCEDURE EVALUATION
10/12/2022  Alyssa Anderson is a 61 y.o., female. For CLN screening  History of +FIT, DM2, Seizure disorder          Active Ambulatory Problems     Diagnosis Date Noted    Age-related nuclear cataract of both eyes 05/03/2022    Primary open angle glaucoma (POAG) of right eye, severe stage 05/03/2022    Primary open angle glaucoma (POAG) of left eye, moderate stage 05/03/2022    Type 2 diabetes mellitus with stage 2 chronic kidney disease, without long-term current use of insulin     Seizure disorder 08/02/2022     Resolved Ambulatory Problems     Diagnosis Date Noted    No Resolved Ambulatory Problems     Past Medical History:   Diagnosis Date    Cataract     Diabetes mellitus     Glaucoma     Stroke 2014       Lab Results   Component Value Date    WBC 4.1 (L) 09/08/2020    HGB 11.3 (L) 09/08/2020    HCT 35.6 09/08/2020     09/08/2020    CHOL 198 09/08/2020    TRIG 44 09/08/2020     (H) 09/08/2020    ALT 21 09/08/2020    AST 16 09/08/2020     09/08/2020    K 3.6 09/08/2020    CREATININE 0.90 09/08/2020    BUN 17 09/08/2020    CO2 28 09/08/2020    TSH 1.450 09/08/2020    HGBA1C 5.4 09/08/2020           Pre-op Assessment    I have reviewed the Patient Summary Reports.     I have reviewed the Nursing Notes. I have reviewed the NPO Status.   I have reviewed the Medications.     Review of Systems  Anesthesia Hx:  No problems with previous Anesthesia  History of prior surgery of interest to airway management or planning: Denies Family Hx of Anesthesia complications.   Denies Personal Hx of Anesthesia complications.   Hematology/Oncology:  Hematology Normal   Oncology Normal     EENT/Dental:EENT/Dental Normal   Cardiovascular:  Cardiovascular Normal     Pulmonary:  Pulmonary Normal    Renal/:   Chronic Renal Disease    Hepatic/GI:  Hepatic/GI Normal    Musculoskeletal:  Musculoskeletal  Normal    Neurological:   CVA Seizures    Endocrine:   Diabetes    Dermatological:  Skin Normal    Psych:  Psychiatric Normal           Physical Exam  General: Well nourished, Cooperative, Alert and Oriented    Airway:  Mallampati: II / II  Mouth Opening: Normal  TM Distance: Normal  Tongue: Normal  Neck ROM: Normal ROM    Dental:  Dentures  Decaying dentition noted at multiple sites -- patient is aware of risk of damage and dislodgment          Anesthesia Plan  Type of Anesthesia, risks & benefits discussed:    Anesthesia Type: Gen Natural Airway  Intra-op Monitoring Plan: Standard ASA Monitors  Post Op Pain Control Plan: IV/PO Opioids PRN  (medical reason for not using multimodal pain management)  Induction:  IV  Informed Consent: Informed consent signed with the Patient and all parties understand the risks and agree with anesthesia plan.  All questions answered. Patient consented to blood products? No  ASA Score: 3  Day of Surgery Review of History & Physical: H&P Update referred to the surgeon/provider.    Ready For Surgery From Anesthesia Perspective.     .

## 2022-10-13 ENCOUNTER — ANESTHESIA (OUTPATIENT)
Dept: ENDOSCOPY | Facility: HOSPITAL | Age: 62
End: 2022-10-13
Payer: MEDICARE

## 2022-10-13 ENCOUNTER — HOSPITAL ENCOUNTER (OUTPATIENT)
Facility: HOSPITAL | Age: 62
Discharge: HOME | End: 2022-10-13
Attending: INTERNAL MEDICINE | Admitting: INTERNAL MEDICINE
Payer: MEDICARE

## 2022-10-13 VITALS
BODY MASS INDEX: 24.29 KG/M2 | RESPIRATION RATE: 18 BRPM | WEIGHT: 154.75 LBS | OXYGEN SATURATION: 97 % | TEMPERATURE: 98 F | HEIGHT: 67 IN | DIASTOLIC BLOOD PRESSURE: 87 MMHG | SYSTOLIC BLOOD PRESSURE: 126 MMHG | HEART RATE: 51 BPM

## 2022-10-13 DIAGNOSIS — Z12.11 ENCOUNTER FOR SCREENING COLONOSCOPY FOR NON-HIGH-RISK PATIENT: ICD-10-CM

## 2022-10-13 DIAGNOSIS — Z12.11 ENCOUNTER FOR SCREENING COLONOSCOPY: Primary | ICD-10-CM

## 2022-10-13 LAB
CTP QC/QA: YES
POCT GLUCOSE: 111 MG/DL (ref 70–110)
SARS-COV-2 AG RESP QL IA.RAPID: NEGATIVE

## 2022-10-13 PROCEDURE — G0121 COLON CA SCRN NOT HI RSK IND: HCPCS | Mod: ,,, | Performed by: INTERNAL MEDICINE

## 2022-10-13 PROCEDURE — 37000008 HC ANESTHESIA 1ST 15 MINUTES: Performed by: INTERNAL MEDICINE

## 2022-10-13 PROCEDURE — 37000009 HC ANESTHESIA EA ADD 15 MINS: Performed by: INTERNAL MEDICINE

## 2022-10-13 PROCEDURE — G0121 COLON CA SCRN NOT HI RSK IND: ICD-10-PCS | Mod: ,,, | Performed by: INTERNAL MEDICINE

## 2022-10-13 PROCEDURE — 63600175 PHARM REV CODE 636 W HCPCS: Performed by: SPECIALIST

## 2022-10-13 PROCEDURE — G0121 COLON CA SCRN NOT HI RSK IND: HCPCS | Performed by: INTERNAL MEDICINE

## 2022-10-13 PROCEDURE — 63600175 PHARM REV CODE 636 W HCPCS: Performed by: NURSE ANESTHETIST, CERTIFIED REGISTERED

## 2022-10-13 RX ORDER — PROPOFOL 10 MG/ML
VIAL (ML) INTRAVENOUS
Status: DISCONTINUED | OUTPATIENT
Start: 2022-10-13 | End: 2022-10-13

## 2022-10-13 RX ORDER — SODIUM CHLORIDE, SODIUM LACTATE, POTASSIUM CHLORIDE, CALCIUM CHLORIDE 600; 310; 30; 20 MG/100ML; MG/100ML; MG/100ML; MG/100ML
INJECTION, SOLUTION INTRAVENOUS CONTINUOUS
Status: DISCONTINUED | OUTPATIENT
Start: 2022-10-13 | End: 2022-10-13 | Stop reason: HOSPADM

## 2022-10-13 RX ADMIN — PROPOFOL 30 MG: 10 INJECTION, EMULSION INTRAVENOUS at 12:10

## 2022-10-13 RX ADMIN — PROPOFOL 50 MG: 10 INJECTION, EMULSION INTRAVENOUS at 12:10

## 2022-10-13 RX ADMIN — PROPOFOL 40 MG: 10 INJECTION, EMULSION INTRAVENOUS at 12:10

## 2022-10-13 RX ADMIN — PROPOFOL 50 MG: 10 INJECTION, EMULSION INTRAVENOUS at 01:10

## 2022-10-13 RX ADMIN — SODIUM CHLORIDE, POTASSIUM CHLORIDE, SODIUM LACTATE AND CALCIUM CHLORIDE: 600; 310; 30; 20 INJECTION, SOLUTION INTRAVENOUS at 12:10

## 2022-10-13 NOTE — DISCHARGE SUMMARY
[facility]  Discharge Note  Short Stay    Procedure(s) (LRB):  COLONOSCOPY (N/A)    The procedure of colonoscopy was explained to the patient and consent confirmed.  She was transferred to the endoscopy suite, general IV anesthesia was provided by Anesthesia Services.  Rectal exam was performed and unremarkable.  The Olympus video colonoscope was then introduced per rectum and advanced around the colon to the cecum.  The ileocecal valve and appendiceal orifice were identified and normal as was the cecal mucosa.  Careful examination of the ascending, transverse, descending, sigmoid colon rectum revealed normal intact mucosa with no diverticulosis or colon polyps.  Scope was withdrawn, withdrawal time cecum to rectum 16 minutes.  The procedure was well tolerated the patient returned to recovery for observation.      Discharge plan is patient will resume a normal diet today and normal activities tomorrow.  Follow-up colonoscopy in 10 years is recommended on a routine screening basis.  OUTCOME: Patient tolerated treatment/procedure well without complication and is now ready for discharge.    DISPOSITION: Home or Self Care    FINAL DIAGNOSIS:  <principal problem not specified>    FOLLOWUP: In clinic    DISCHARGE INSTRUCTIONS:    Discharge Procedure Orders   Diet general     Diet general     Call MD for:  temperature >100.4     Call MD for:  persistent nausea and vomiting     Call MD for:  severe uncontrolled pain     Call MD for:  difficulty breathing, headache or visual disturbances     Call MD for:  temperature >100.4     Call MD for:  persistent nausea and vomiting     Call MD for:  severe uncontrolled pain     Call MD for:  difficulty breathing, headache or visual disturbances     Activity as tolerated     Activity as tolerated         Clinical Reference Documents Added to Patient Instructions         Document    COLONOSCOPY DISCHARGE INSTRUCTIONS (ENGLISH)            TIME SPENT ON DISCHARGE: 10  minutes

## 2022-10-13 NOTE — H&P
Colonoscopy History and Physical    Patient Name: Alyssa Anderson  MRN: [id number]  : 1960  Date of Procedure:  10/13/2022  Referring Physician: Yvonne Adkins MD  Primary Physician: Yvonne Adkins MD  Procedure Physician: Dr. Rasheed MD     Procedure - Colonoscopy  ASA - per anesthesia  Mallampati - per anesthesia  History of Anesthesia problems - no  Family history Anesthesia problems -  no   Plan of anesthesia - General    Diagnosis: screening for colon cancer  Chief Complaint: Same as above    HPI: Patient is an 61 y.o. female with past medical history of stroke (currently on Plavix - last dose was 5 days ago), seizures (takes Keppra), DM and glaucoma who is here for the above.     Last colonoscopy: none   Family history: n/a  Anticoagulation: Plavix (last taken 5 days ago)    ROS:  Constitutional: No fevers, chills, No weight loss  CV: No chest pain  Pulm: No cough, No shortness of breath  GI: see HPI    Medical History:   Past Medical History:   Diagnosis Date    Cataract     Diabetes mellitus     Glaucoma     Stroke          Surgical History:   Past Surgical History:   Procedure Laterality Date    HYSTERECTOMY  1984       Family History:   Family History   Problem Relation Age of Onset    Diabetes Mother     Glaucoma Mother     Vision loss Maternal Grandmother     Hypertension Sister     No Known Problems Father     No Known Problems Brother     No Known Problems Maternal Aunt     No Known Problems Maternal Uncle     No Known Problems Paternal Aunt     No Known Problems Paternal Uncle     No Known Problems Maternal Grandfather     No Known Problems Paternal Grandmother     No Known Problems Paternal Grandfather     Amblyopia Neg Hx     Blindness Neg Hx     Cancer Neg Hx     Cataracts Neg Hx     Macular degeneration Neg Hx     Retinal detachment Neg Hx     Strabismus Neg Hx     Stroke Neg Hx     Thyroid disease Neg Hx    .    Social History:   Social History     Socioeconomic History     Marital status: Single   Tobacco Use    Smoking status: Never    Smokeless tobacco: Never   Substance and Sexual Activity    Alcohol use: Never    Drug use: Never    Sexual activity: Not Currently     Social Determinants of Health     Financial Resource Strain: Low Risk     Difficulty of Paying Living Expenses: Not very hard   Food Insecurity: No Food Insecurity    Worried About Running Out of Food in the Last Year: Never true    Ran Out of Food in the Last Year: Never true   Transportation Needs: No Transportation Needs    Lack of Transportation (Medical): No    Lack of Transportation (Non-Medical): No   Physical Activity: Sufficiently Active    Days of Exercise per Week: 6 days    Minutes of Exercise per Session: 60 min   Stress: No Stress Concern Present    Feeling of Stress : Not at all   Social Connections: Moderately Integrated    Frequency of Communication with Friends and Family: Twice a week    Frequency of Social Gatherings with Friends and Family: Once a week    Attends [employer] Services: More than 4 times per year    Active Member of Clubs or Organizations: No    Attends Club or Organization Meetings: Never    Marital Status:    Housing Stability: Low Risk     Unable to Pay for Housing in the Last Year: No    Number of Places Lived in the Last Year: 1    Unstable Housing in the Last Year: No       Review of patient's allergies indicates:  No Known Allergies    Medications:   Medications Prior to Admission   Medication Sig Dispense Refill Last Dose    amLODIPine (NORVASC) 10 MG tablet Take 10 mg by mouth once daily.   10/12/2022    aspirin (ECOTRIN) 81 MG EC tablet Take 81 mg by mouth once daily.   10/12/2022    atorvastatin (LIPITOR) 20 MG tablet Take 20 mg by mouth once daily.   10/12/2022    brimonidine 0.15 % OPTH DROP (ALPHAGAN) 0.15 % ophthalmic solution Place 1 drop into both eyes 3 (three) times daily.   10/12/2022    brimonidine 0.2% (ALPHAGAN) 0.2 % Drop PLACE 1 DROP IN BOTH EYES 3 TIMES  A DAY FOR 30 DAYS   10/12/2022    clopidogreL (PLAVIX) 75 mg tablet Take 75 mg by mouth once daily.   Past Week    dorzolamide-timolol 2-0.5% (COSOPT) 22.3-6.8 mg/mL ophthalmic solution 1 drop 2 (two) times daily.   10/12/2022    hydroCHLOROthiazide (HYDRODIURIL) 12.5 MG Tab Take 12.5 mg by mouth once daily.   10/12/2022    latanoprost 0.005 % ophthalmic solution 1 drop every evening.   10/12/2022    levETIRAcetam (KEPPRA) 1000 MG tablet Take 0.5 tablets (500 mg total) by mouth 2 (two) times daily. 90 tablet 0 10/12/2022    lisinopriL (PRINIVIL,ZESTRIL) 40 MG tablet Take 1 tablet (40 mg total) by mouth once daily. 1 tablet 2 10/12/2022    metFORMIN (GLUCOPHAGE) 1000 MG tablet Take 1,000 mg by mouth 2 (two) times daily with meals.   10/12/2022    venlafaxine (EFFEXOR-XR) 75 MG 24 hr capsule Take 75 mg by mouth once daily.   10/12/2022         Physical Exam:    Vital Signs: There were no vitals filed for this visit.  There were no vitals taken for this visit.    General:          Well appearing in no acute distress  Lungs: Clear to auscultation bilaterally, respirations unlabored  Heart: Regular rate and rhythm, S1 and S2 normal, no obvious murmurs  Abdomen:         Soft, non-tender, bowel sounds normal, no masses, no organomegaly        Labs:  Lab Results   Component Value Date    WBC 4.1 (L) 09/08/2020    HGB 11.3 (L) 09/08/2020    HCT 35.6 09/08/2020    MCV 93.9 09/08/2020     09/08/2020     No results found for: INR, PT, APTT  Lab Results   Component Value Date     09/08/2020    K 3.6 09/08/2020    CO2 28 09/08/2020    BUN 17 09/08/2020    CREATININE 0.90 09/08/2020    LABPROT 8.0 09/08/2020    ALBUMIN 3.8 09/08/2020    BILITOT 0.5 09/08/2020    ALKPHOS 68 09/08/2020    ALT 21 09/08/2020    AST 16 09/08/2020         Assessment and Plan:    History reviewed, vital signs satisfactory, cardiopulmonary status satisfactory.  I have explained the sedation options, risks, benefits, and alternatives of this  endoscopic procedure to the patient including but not limited to bleeding, inflammation, infection, perforation, and death.  All questions were answered and the patient consented to proceed with procedure as planned.   The patient is deemed an appropriate candidate for the sedation as planned.      Delmy Roe MD  [employer] General Surgery, PGY2  10/13/2022  12:11 PM

## 2022-10-13 NOTE — PROVATION PATIENT INSTRUCTIONS
Discharge Summary/Instructions after an Endoscopic Procedure  Patient Name: Alyssa Anderson  Patient MRN: [id number]  Patient YOB: 1960 Thursday, October 13, 2022  Yusuf Iqbal MD  Dear patient,  As a result of recent federal legislation (The Federal Cures Act), you may   receive lab or pathology results from your procedure in your [facility]   account before your physician is able to contact you. Your physician or   their representative will relay the results to you with their   recommendations at their soonest availability.  Thank you,  RESTRICTIONS:  During your procedure today, you received medications for sedation.  These   medications may affect your judgment, balance and coordination.  Therefore,   for 24 hours, you have the following restrictions:   - DO NOT drive a car, operate machinery, make legal/financial decisions,   sign important papers or drink alcohol.    ACTIVITY:  Today: no heavy lifting, straining or running due to procedural   sedation/anesthesia.  The following day: return to full activity including work.  DIET:  Eat and drink normally unless instructed otherwise.     TREATMENT FOR COMMON SIDE EFFECTS:  - Mild abdominal pain, nausea, belching, bloating or excessive gas:  rest,   eat lightly and use a heating pad.  - Sore Throat: treat with throat lozenges and/or gargle with warm salt   water.  - Because air was used during the procedure, expelling large amounts of air   from your rectum or belching is normal.  - If a bowel prep was taken, you may not have a bowel movement for 1-3 days.    This is normal.  SYMPTOMS TO WATCH FOR AND REPORT TO YOUR PHYSICIAN:  1. Abdominal pain or bloating, other than gas cramps.  2. Chest pain.  3. Back pain.  4. Signs of infection such as: chills or fever occurring within 24 hours   after the procedure.  5. Rectal bleeding, which would show as bright red, maroon, or black stools.   (A tablespoon of blood from the rectum is not serious, especially  if   hemorrhoids are present.)  6. Vomiting.  7. Weakness or dizziness.  GO DIRECTLY TO THE NEAREST EMERGENCY ROOM IF YOU HAVE ANY OF THE FOLLOWING:      Difficulty breathing              Chills and/or fever over 101 F   Persistent vomiting and/or vomiting blood   Severe abdominal pain   Severe chest pain   Black, tarry stools   Bleeding- more than one tablespoon   Any other symptom or condition that you feel may need urgent attention  Your doctor recommends these additional instructions:  If any biopsies were taken, your doctors clinic will contact you in 1 to 2   weeks with any results.  - Discharge patient to home (ambulatory).   - Resume previous diet today.   - Repeat colonoscopy in 10 years for screening purposes.  For questions, problems or results please call your physician - Yusuf Iqbal MD at Work:  [phone].  [facility] , EMERGENCY ROOM PHONE NUMBER: [phone]  IF A COMPLICATION OR EMERGENCY SITUATION ARISES AND YOU ARE UNABLE TO REACH   YOUR PHYSICIAN - GO DIRECTLY TO THE EMERGENCY ROOM.  MD Yusuf Hercules MD  10/13/2022 1:42:00 PM  This report has been verified and signed electronically.  Dear patient,  As a result of recent federal legislation (The Federal Cures Act), you may   receive lab or pathology results from your procedure in your [facility]   account before your physician is able to contact you. Your physician or   their representative will relay the results to you with their   recommendations at their soonest availability.  Thank you,  PROVATION

## 2022-10-13 NOTE — TRANSFER OF CARE
"Anesthesia Transfer of Care Note    Patient: Alyssa Anderson    Procedure(s) Performed: Procedure(s) (LRB):  COLONOSCOPY (N/A)    Patient location: GI    Anesthesia Type: general    Post pain: adequate analgesia    Post assessment: no apparent anesthetic complications and tolerated procedure well    Post vital signs: stable    Level of consciousness: awake    Nausea/Vomiting: no nausea/vomiting    Complications: none    Transfer of care protocol was followed      Last vitals:   Visit Vitals  /60   Pulse (!) 55   Temp 36.8 °C (98.2 °F)   Resp 18   Ht 5' 7" (1.702 m)   Wt 70.2 kg (154 lb 12.2 oz)   SpO2 98%   BMI 24.24 kg/m²     "

## 2022-10-13 NOTE — ANESTHESIA POSTPROCEDURE EVALUATION
Anesthesia Post Evaluation    Patient: Alyssa Anderson    Procedure(s) Performed: Procedure(s) (LRB):  COLONOSCOPY (N/A)    Final Anesthesia Type: general      Patient location during evaluation: GI PACU  Patient participation: Yes- Able to Participate  Level of consciousness: awake and alert  Post-procedure vital signs: reviewed and stable  Pain management: adequate  Airway patency: patent    PONV status at discharge: No PONV  Anesthetic complications: no      Cardiovascular status: hemodynamically stable  Respiratory status: unassisted and room air  Follow-up not needed.          Vitals Value Taken Time   /60 10/13/22 1337   Temp 36.8 °C (98.2 °F) 10/13/22 1218   Pulse 55 10/13/22 1337   Resp 18 10/13/22 1337   SpO2 98 % 10/13/22 1337         No case tracking events are documented in the log.      Pain/Gomez Score: Gomez Score: 9 (10/13/2022  1:37 PM)

## 2022-10-14 DIAGNOSIS — N18.2 TYPE 2 DIABETES MELLITUS WITH STAGE 2 CHRONIC KIDNEY DISEASE, WITHOUT LONG-TERM CURRENT USE OF INSULIN: Primary | ICD-10-CM

## 2022-10-14 DIAGNOSIS — E11.22 TYPE 2 DIABETES MELLITUS WITH STAGE 2 CHRONIC KIDNEY DISEASE, WITHOUT LONG-TERM CURRENT USE OF INSULIN: Primary | ICD-10-CM

## 2022-10-20 RX ORDER — METFORMIN HYDROCHLORIDE 1000 MG/1
1000 TABLET ORAL 2 TIMES DAILY WITH MEALS
Qty: 30 TABLET | Refills: 2 | Status: SHIPPED | OUTPATIENT
Start: 2022-10-20 | End: 2022-11-01 | Stop reason: SDUPTHER

## 2022-10-20 RX ORDER — ATORVASTATIN CALCIUM 20 MG/1
20 TABLET, FILM COATED ORAL DAILY
Qty: 90 TABLET | Refills: 1 | Status: SHIPPED | OUTPATIENT
Start: 2022-10-20 | End: 2023-06-16 | Stop reason: SDUPTHER

## 2022-11-01 ENCOUNTER — OFFICE VISIT (OUTPATIENT)
Dept: INTERNAL MEDICINE | Facility: CLINIC | Age: 62
End: 2022-11-01
Payer: MEDICARE

## 2022-11-01 ENCOUNTER — LAB VISIT (OUTPATIENT)
Dept: LAB | Facility: HOSPITAL | Age: 62
End: 2022-11-01
Attending: INTERNAL MEDICINE
Payer: MEDICARE

## 2022-11-01 VITALS
TEMPERATURE: 98 F | WEIGHT: 152.63 LBS | HEART RATE: 66 BPM | OXYGEN SATURATION: 96 % | SYSTOLIC BLOOD PRESSURE: 122 MMHG | RESPIRATION RATE: 16 BRPM | HEIGHT: 67 IN | BODY MASS INDEX: 23.96 KG/M2 | DIASTOLIC BLOOD PRESSURE: 87 MMHG

## 2022-11-01 DIAGNOSIS — N18.2 TYPE 2 DIABETES MELLITUS WITH STAGE 2 CHRONIC KIDNEY DISEASE, WITHOUT LONG-TERM CURRENT USE OF INSULIN: ICD-10-CM

## 2022-11-01 DIAGNOSIS — E78.5 HYPERLIPIDEMIA, UNSPECIFIED HYPERLIPIDEMIA TYPE: ICD-10-CM

## 2022-11-01 DIAGNOSIS — E11.22 TYPE 2 DIABETES MELLITUS WITH STAGE 2 CHRONIC KIDNEY DISEASE, WITHOUT LONG-TERM CURRENT USE OF INSULIN: ICD-10-CM

## 2022-11-01 DIAGNOSIS — G40.909 SEIZURE DISORDER: Primary | ICD-10-CM

## 2022-11-01 DIAGNOSIS — E55.9 VITAMIN D DEFICIENCY, UNSPECIFIED: ICD-10-CM

## 2022-11-01 DIAGNOSIS — N18.2 CHRONIC KIDNEY DISEASE, STAGE II (MILD): ICD-10-CM

## 2022-11-01 DIAGNOSIS — R56.9 SEIZURE: ICD-10-CM

## 2022-11-01 DIAGNOSIS — I10 HYPERTENSION, UNSPECIFIED TYPE: ICD-10-CM

## 2022-11-01 LAB
ALBUMIN SERPL-MCNC: 4.3 GM/DL (ref 3.4–4.8)
ALBUMIN/GLOB SERPL: 1.2 RATIO (ref 1.1–2)
ALP SERPL-CCNC: 72 UNIT/L (ref 40–150)
ALT SERPL-CCNC: 14 UNIT/L (ref 0–55)
AST SERPL-CCNC: 19 UNIT/L (ref 5–34)
BASOPHILS # BLD AUTO: 0.07 X10(3)/MCL (ref 0–0.2)
BASOPHILS NFR BLD AUTO: 1.4 %
BILIRUBIN DIRECT+TOT PNL SERPL-MCNC: 0.5 MG/DL
BUN SERPL-MCNC: 17.1 MG/DL (ref 9.8–20.1)
CALCIUM SERPL-MCNC: 10.1 MG/DL (ref 8.4–10.2)
CHLORIDE SERPL-SCNC: 103 MMOL/L (ref 98–107)
CHOLEST SERPL-MCNC: 168 MG/DL
CHOLEST/HDLC SERPL: 2 {RATIO} (ref 0–5)
CO2 SERPL-SCNC: 26 MMOL/L (ref 23–31)
CREAT SERPL-MCNC: 1.03 MG/DL (ref 0.55–1.02)
DEPRECATED CALCIDIOL+CALCIFEROL SERPL-MC: 21.4 NG/ML (ref 30–80)
EOSINOPHIL # BLD AUTO: 0.08 X10(3)/MCL (ref 0–0.9)
EOSINOPHIL NFR BLD AUTO: 1.6 %
ERYTHROCYTE [DISTWIDTH] IN BLOOD BY AUTOMATED COUNT: 12.6 % (ref 11.5–17)
EST. AVERAGE GLUCOSE BLD GHB EST-MCNC: 99.7 MG/DL
GFR SERPLBLD CREATININE-BSD FMLA CKD-EPI: >60 MLS/MIN/1.73/M2
GLOBULIN SER-MCNC: 3.6 GM/DL (ref 2.4–3.5)
GLUCOSE SERPL-MCNC: 83 MG/DL (ref 82–115)
HBA1C MFR BLD: 5.1 %
HCT VFR BLD AUTO: 37.5 % (ref 37–47)
HDLC SERPL-MCNC: 71 MG/DL (ref 35–60)
HGB BLD-MCNC: 12.2 GM/DL (ref 12–16)
IMM GRANULOCYTES # BLD AUTO: 0.01 X10(3)/MCL (ref 0–0.04)
IMM GRANULOCYTES NFR BLD AUTO: 0.2 %
LDLC SERPL CALC-MCNC: 88 MG/DL (ref 50–140)
LYMPHOCYTES # BLD AUTO: 1.87 X10(3)/MCL (ref 0.6–4.6)
LYMPHOCYTES NFR BLD AUTO: 37.2 %
MCH RBC QN AUTO: 30.3 PG (ref 27–31)
MCHC RBC AUTO-ENTMCNC: 32.5 MG/DL (ref 33–36)
MCV RBC AUTO: 93.1 FL (ref 80–94)
MONOCYTES # BLD AUTO: 0.39 X10(3)/MCL (ref 0.1–1.3)
MONOCYTES NFR BLD AUTO: 7.8 %
NEUTROPHILS # BLD AUTO: 2.6 X10(3)/MCL (ref 2.1–9.2)
NEUTROPHILS NFR BLD AUTO: 51.8 %
NRBC BLD AUTO-RTO: 0 %
PLATELET # BLD AUTO: 229 X10(3)/MCL (ref 130–400)
PMV BLD AUTO: 11.6 FL (ref 7.4–10.4)
POTASSIUM SERPL-SCNC: 3.3 MMOL/L (ref 3.5–5.1)
PROT SERPL-MCNC: 7.9 GM/DL (ref 5.8–7.6)
RBC # BLD AUTO: 4.03 X10(6)/MCL (ref 4.2–5.4)
SODIUM SERPL-SCNC: 144 MMOL/L (ref 136–145)
TRIGL SERPL-MCNC: 46 MG/DL (ref 37–140)
TSH SERPL-ACNC: 1.65 UIU/ML (ref 0.35–4.94)
VLDLC SERPL CALC-MCNC: 9 MG/DL
WBC # SPEC AUTO: 5 X10(3)/MCL (ref 4.5–11.5)

## 2022-11-01 PROCEDURE — 82306 VITAMIN D 25 HYDROXY: CPT

## 2022-11-01 PROCEDURE — 80061 LIPID PANEL: CPT

## 2022-11-01 PROCEDURE — 80053 COMPREHEN METABOLIC PANEL: CPT

## 2022-11-01 PROCEDURE — 83036 HEMOGLOBIN GLYCOSYLATED A1C: CPT

## 2022-11-01 PROCEDURE — 84443 ASSAY THYROID STIM HORMONE: CPT

## 2022-11-01 PROCEDURE — 36415 COLL VENOUS BLD VENIPUNCTURE: CPT

## 2022-11-01 PROCEDURE — 99214 OFFICE O/P EST MOD 30 MIN: CPT | Mod: PBBFAC | Performed by: INTERNAL MEDICINE

## 2022-11-01 PROCEDURE — 85025 COMPLETE CBC W/AUTO DIFF WBC: CPT

## 2022-11-01 RX ORDER — HYDROCHLOROTHIAZIDE 12.5 MG/1
12.5 TABLET ORAL DAILY
Qty: 90 TABLET | Refills: 2 | Status: SHIPPED | OUTPATIENT
Start: 2022-11-01 | End: 2023-06-16 | Stop reason: SDUPTHER

## 2022-11-01 RX ORDER — METFORMIN HYDROCHLORIDE 500 MG/1
500 TABLET ORAL
Qty: 90 TABLET | Refills: 2 | Status: SHIPPED | OUTPATIENT
Start: 2022-11-01 | End: 2023-06-16 | Stop reason: SDUPTHER

## 2022-11-01 RX ORDER — LEVETIRACETAM 500 MG/1
500 TABLET ORAL 2 TIMES DAILY
Qty: 90 TABLET | Refills: 2 | Status: SHIPPED | OUTPATIENT
Start: 2022-11-01 | End: 2023-02-27 | Stop reason: SDUPTHER

## 2022-11-01 RX ORDER — AMLODIPINE BESYLATE 10 MG/1
10 TABLET ORAL DAILY
Qty: 90 TABLET | Refills: 2 | Status: SHIPPED | OUTPATIENT
Start: 2022-11-01 | End: 2023-06-16 | Stop reason: SDUPTHER

## 2022-11-01 RX ORDER — LISINOPRIL 40 MG/1
40 TABLET ORAL DAILY
Qty: 90 TABLET | Refills: 2 | Status: SHIPPED | OUTPATIENT
Start: 2022-11-01 | End: 2023-02-27 | Stop reason: SDUPTHER

## 2022-11-01 NOTE — PROGRESS NOTES
[facility] INTERNAL MEDICINE  OUTPATIENT OFFICE VISIT NOTE    SUBJECTIVE:    HPI: Alyssa Anderson is a 61 y.o. yo female non-insulin diabetes mellitus type II, and CVA that presents to clinic for a follow up appointment.  Patient was previously seen by another provider at this clinic.  Last hemoglobin A1c 5.1, currently on Metformin 500 mg daily.  She currently follows Neurology due to history of stroke, currently on Plavix and aspirin. No new symptoms. Patient has residual symptoms: expressive aphasia.  She reports that she had seizures in the past and was placed on Keppra by Neurology. She states that she stopped taking the medication and started having seizures. She started the medication again and reports she has not had a seizure in many years.  Patient denies chest pain, palpitations, and shortness of breath.   Patient denies fever, night sweats, chills, nausea, vomiting, diarrhea, constipation, weight loss, and changes in appetite.    Review of Systems:  Constitutional: No fever, No chills, No sweats, No weakness, No fatigue, No decreased activity.   Eye: No recent visual problem, No icterus, No double vision.  Ear/Nose/Mouth/Throat: No nasal congestion, No sore throat.   Respiratory: No shortness of breath, No cough, No sputum production, No hemoptysis,   No wheezing, No cyanosis.   Cardiovascular: No chest pain, No palpitations, No peripheral edema, No syncope.   Gastrointestinal: No nausea, No vomiting, No diarrhea, No constipation, No hematemesis.   Genitourinary: No dysuria, No hematuria, No change in urine stream, No urethral discharge.   Hematology/Lymphatics: No bruising tendency, No bleeding tendency.   Endocrine: No polyuria, No cold intolerance, No heat intolerance.   Immunologic: Not immunocompromised, No recurrent fevers.   Musculoskeletal: No joint pain, No claudication.   Integumentary: No rash, No pruritus, No abrasions, No petechiae.    Neurologic: Alert and oriented X4, No abnormal balance, No  "numbness, No tingling.   Psychiatric: No anxiety, no depression, Not suicidal, Not delusional, No hallucinations.     OBJECTIVE:    Vitals:   /87 (BP Location: Left arm, Patient Position: Sitting, BP Method: Large (Automatic))   Pulse 66   Temp 98 °F (36.7 °C) (Oral)   Resp 16   Ht 5' 7" (1.702 m)   Wt 69.2 kg (152 lb 9.6 oz)   SpO2 96%   BMI 23.90 kg/m²      Physical Exam:  General: Alert and oriented, No acute distress.   Eye: Pupils are equal, round and reactive to light, Extraocular movements are intact,   Normal conjunctiva, Vision unchanged.   HENT: Normocephalic, Normal hearing, Oral mucosa is moist.   Neck: Supple, No carotid bruit, No jugular venous distention, No thyromegaly.   Respiratory: Lungs are clear to auscultation, Respirations are non-labored, Breath sounds   are equal, Symmetrical chest wall expansion, No chest wall tenderness.   Cardiovascular: Normal rate, Regular rhythm, No murmur, No gallop, Good pulses equal in   All extremities, Normal peripheral perfusion, No edema.   Gastrointestinal: Soft, Non-tender, Non-distended, Normal bowel sounds.   Genitourinary: No costovertebral angle tenderness, No suprapubic tenderness.   Musculoskeletal: Normal range of motion, Normal strength, No swelling, No deformity, Normal gait.   Integumentary: Warm, No pallor, No rash.   Neurologic: Alert, Oriented, Normal sensory, Normal motor function, No focal deficits,   Cranial Nerves II-XII are grossly intact, Monofilament examination: No sensory loss on plantar   or dorsal surface of feet bilaterally.   Psychiatric: Cooperative, Appropriate mood & affect.     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 Diabetic fundus photos no retinopathy, " last appointment with Ophthalmology 10/2021. Monofilament examination was normal 4/2022, next screening 4/2023. Lifestyle modifications.  Avoid nephrotoxic drugs.  Continue medications Lisinopril 40 mg daily, Norvasc 10 mg daily, and Hydrochlorothiazide 12.5 mg daily. Patient reports systolic blood pressure 100-115.  Patient reports no new seizures. Continue Keppra. Patient is on 500 mg BID per last note. Continue to follow Neurology. Neurology has recommended to continue Plavix and Aspirin.  Completed prescription, recheck at a later date.  Controlled with Effexor, which she was started on by another provider.  Continue to follow up with Ophthalmology and continue eyedrops.      Return to clinic in 3 months.     Yvonne Adkins MD  [facility] - Internal Medicine

## 2022-12-07 NOTE — TELEPHONE ENCOUNTER
----- Message from Joanne Roman sent at 12/7/2022  2:45 PM CST -----  Regarding: refill on drop  Patient needs refill on drops patient stated pharmacy on fill    12/07/2022 3:47 PM  Patient requesting refill of latanoprost OU QHS, cosopt OU BID, and brimonidine OU TID. Please review and accept refills.

## 2022-12-08 RX ORDER — DORZOLAMIDE HYDROCHLORIDE AND TIMOLOL MALEATE 20; 5 MG/ML; MG/ML
1 SOLUTION/ DROPS OPHTHALMIC 2 TIMES DAILY
Qty: 2 EACH | Refills: 2 | Status: SHIPPED | OUTPATIENT
Start: 2022-12-08 | End: 2024-01-02

## 2022-12-08 RX ORDER — LATANOPROST 50 UG/ML
1 SOLUTION/ DROPS OPHTHALMIC NIGHTLY
Qty: 6 ML | Refills: 4 | Status: SHIPPED | OUTPATIENT
Start: 2022-12-08 | End: 2024-01-17 | Stop reason: SDUPTHER

## 2022-12-08 RX ORDER — BRIMONIDINE TARTRATE 2 MG/ML
1 SOLUTION/ DROPS OPHTHALMIC 3 TIMES DAILY
Qty: 18 ML | Refills: 4 | Status: SHIPPED | OUTPATIENT
Start: 2022-12-08 | End: 2023-12-15

## 2022-12-08 NOTE — TELEPHONE ENCOUNTER
----- Message from Joanne Roman sent at 12/8/2022 11:09 AM CST -----  Regarding: CALL BACK  PT SAID A NURSE CALLED HER SHE WOULD LIKE A CALL BACK   [phone]    12/08/2022   2:28 PM  Spoke with patient she was checking on refills. I let her know that refills are waiting for doctors approval. Talked to Dr. Mickey Woodard and he said Dr. Dumas sent them in .

## 2022-12-27 ENCOUNTER — CLINICAL SUPPORT (OUTPATIENT)
Dept: OPHTHALMOLOGY | Facility: CLINIC | Age: 62
End: 2022-12-27
Payer: MEDICARE

## 2022-12-27 VITALS — HEIGHT: 67 IN | WEIGHT: 152.56 LBS | BODY MASS INDEX: 23.94 KG/M2

## 2022-12-27 DIAGNOSIS — H40.1113 PRIMARY OPEN ANGLE GLAUCOMA (POAG) OF RIGHT EYE, SEVERE STAGE: ICD-10-CM

## 2022-12-27 DIAGNOSIS — H40.1122 PRIMARY OPEN ANGLE GLAUCOMA (POAG) OF LEFT EYE, MODERATE STAGE: Primary | ICD-10-CM

## 2022-12-27 DIAGNOSIS — H25.13 AGE-RELATED NUCLEAR CATARACT OF BOTH EYES: ICD-10-CM

## 2022-12-27 PROCEDURE — 99213 OFFICE O/P EST LOW 20 MIN: CPT | Mod: PBBFAC,PO | Performed by: STUDENT IN AN ORGANIZED HEALTH CARE EDUCATION/TRAINING PROGRAM

## 2022-12-27 PROCEDURE — 92083 EXTENDED VISUAL FIELD XM: CPT | Mod: PBBFAC,PO | Performed by: OPHTHALMOLOGY

## 2022-12-27 PROCEDURE — 92083 EXTENDED VISUAL FIELD XM: CPT | Mod: PBBFAC,PO | Performed by: STUDENT IN AN ORGANIZED HEALTH CARE EDUCATION/TRAINING PROGRAM

## 2022-12-27 RX ORDER — POLYETHYLENE GLYCOL 3350, SODIUM SULFATE, SODIUM CHLORIDE, POTASSIUM CHLORIDE, ASCORBIC ACID, SODIUM ASCORBATE 7.5-2.691G
KIT ORAL
COMMUNITY
Start: 2022-10-11

## 2022-12-27 NOTE — PROGRESS NOTES
HPI     Glaucoma     Additional comments: POAG, Right Eye, Severe and POAG, Left Eye,   Moderate. IOP check, HVF. Patient requesting 90 day supply of all drops.            Comments    POAG, Right Eye, Severe and POAG, Left Eye, Moderate          Last edited by Anselmo Pnia MA on 12/27/2022 10:37 AM.      Assessment /Plan     For exam results, see Encounter Report.    Primary open angle glaucoma (POAG) of left eye, moderate stage    Primary open angle glaucoma (POAG) of right eye, severe stage    Age-related nuclear cataract of both eyes        OCT RNFL   08/03/22: 38//72; red STI, yel N // red I, yel N  08/25/22: 50//76; all red // yel I    OCT Mac 8/3/22  - OD: 207, generalized retinal atrophy  - OS: 249, good foveal contour     HVF 8/3/22  - OD: 11/12 FL 38% FP 21% FN, unreliable dense SAD  - OS: 4/11 2% FP 0% FN, unreliable, nonspecific deficits     HVF 12/27/22  - OD: 7/13 FL 8% FP 8% FN, dense superior defect mostly respecting horizontal; inf NS  - OS: 3/11 3% FP 5% FN, scattered inf and nasal misses - not matching prior misses     1. POAG, Right Eye, Severe  2. POAG, Left Eye, Moderate  - + FH, thin pachy  - gonio 2/3/22 open 360 OU  - target pressure 13 // 13, unknown tmax  - OCT RNFL 08/21 45 all red // 76 yellow I, rest green. Increased thinning OS compared to 8/2021  - HVF stable w superior hemifield defect OD and scattered non-specific defects OS  - Continue xalantan QHS, cosopt BID, brimonidine TID  - Monocular precautions given, polycarbs dispensed  - OCT last visit with poor signal strength (8/3/22). Repeated OCT today and it is stable from 2021 in the left eye.   - At this time, will not proceed with SLT or xen gel OS. OD is comfort care. Patient denies any pain  12/27/22: new VF with stable findings OD (dens sup alt + early inf NS), os with nonspecific misses not consistent with prior. Continue drops as above. IOP doing well     3. NSC OU  - NVS, CTM     4. DM II w/o Retinopathy  Hemoglobin A1c    Date Value Ref Range Status   11/01/2022 5.1 <=7.0 % Final   09/08/2020 5.4 4.2 - 6.3 % Final   11/05/2019 5.0 4.2 - 6.3 % Final   - encouraged good control/regular PCP f/u  - yearly DFE due 08/2023     5. PVD OU  - RD precautions  - monitor      RTC 4 months for iop check

## 2023-01-25 ENCOUNTER — HOSPITAL ENCOUNTER (EMERGENCY)
Facility: HOSPITAL | Age: 63
Discharge: HOME | End: 2023-01-25
Attending: EMERGENCY MEDICINE
Payer: MEDICARE

## 2023-01-25 VITALS
TEMPERATURE: 97 F | DIASTOLIC BLOOD PRESSURE: 100 MMHG | OXYGEN SATURATION: 98 % | SYSTOLIC BLOOD PRESSURE: 132 MMHG | RESPIRATION RATE: 18 BRPM | HEART RATE: 60 BPM
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
## 2023-01-25 DIAGNOSIS — S42.91XA FRACTURE OF RIGHT SHOULDER, CLOSED, INITIAL ENCOUNTER: Primary | ICD-10-CM

## 2023-01-25 LAB
ALBUMIN SERPL-MCNC: 4.1 G/DL (ref 3.4–4.8)
ALBUMIN/GLOB SERPL: 1.1 RATIO (ref 1.1–2)
ALP SERPL-CCNC: 73 UNIT/L (ref 40–150)
ALT SERPL-CCNC: 12 UNIT/L (ref 0–55)
APTT PPP: 21.9 SECONDS (ref 23.2–33.7)
AST SERPL-CCNC: 16 UNIT/L (ref 5–34)
BASOPHILS # BLD AUTO: 0.07 X10(3)/MCL (ref 0–0.2)
BASOPHILS NFR BLD AUTO: 1.3 %
BILIRUBIN DIRECT+TOT PNL SERPL-MCNC: 0.6 MG/DL
BUN SERPL-MCNC: 23.7 MG/DL (ref 9.8–20.1)
CALCIUM SERPL-MCNC: 10.3 MG/DL (ref 8.4–10.2)
CHLORIDE SERPL-SCNC: 105 MMOL/L (ref 98–107)
CO2 SERPL-SCNC: 22 MMOL/L (ref 23–31)
CREAT SERPL-MCNC: 1.16 MG/DL (ref 0.55–1.02)
EOSINOPHIL # BLD AUTO: 0.06 X10(3)/MCL (ref 0–0.9)
EOSINOPHIL NFR BLD AUTO: 1.1 %
ERYTHROCYTE [DISTWIDTH] IN BLOOD BY AUTOMATED COUNT: 12.7 % (ref 11.5–17)
ETHANOL SERPL-MCNC: <10 MG/DL
GFR SERPLBLD CREATININE-BSD FMLA CKD-EPI: 54 MLS/MIN/1.73/M2
GLOBULIN SER-MCNC: 3.9 GM/DL (ref 2.4–3.5)
GLUCOSE SERPL-MCNC: 100 MG/DL (ref 82–115)
GROUP & RH: NORMAL
HCT VFR BLD AUTO: 38.1 % (ref 37–47)
HGB BLD-MCNC: 12.8 GM/DL (ref 12–16)
IMM GRANULOCYTES # BLD AUTO: 0.01 X10(3)/MCL (ref 0–0.04)
IMM GRANULOCYTES NFR BLD AUTO: 0.2 %
INDIRECT COOMBS GEL: NORMAL
INR BLD: 0.95 (ref 0–1.3)
LACTATE SERPL-SCNC: 1.3 MMOL/L (ref 0.5–2.2)
LYMPHOCYTES # BLD AUTO: 1.42 X10(3)/MCL (ref 0.6–4.6)
LYMPHOCYTES NFR BLD AUTO: 26.3 %
MCH RBC QN AUTO: 30.5 PG
MCHC RBC AUTO-ENTMCNC: 33.6 MG/DL (ref 33–36)
MCV RBC AUTO: 90.9 FL (ref 80–94)
MONOCYTES # BLD AUTO: 0.38 X10(3)/MCL (ref 0.1–1.3)
MONOCYTES NFR BLD AUTO: 7 %
NEUTROPHILS # BLD AUTO: 3.46 X10(3)/MCL (ref 2.1–9.2)
NEUTROPHILS NFR BLD AUTO: 64.1 %
NRBC BLD AUTO-RTO: 0 %
PLATELET # BLD AUTO: 236 X10(3)/MCL (ref 130–400)
PMV BLD AUTO: 11.3 FL (ref 7.4–10.4)
POTASSIUM SERPL-SCNC: 3.3 MMOL/L (ref 3.5–5.1)
PROT SERPL-MCNC: 8 GM/DL (ref 5.8–7.6)
PROTHROMBIN TIME: 12.6 SECONDS (ref 12.5–14.5)
RBC # BLD AUTO: 4.19 X10(6)/MCL (ref 4.2–5.4)
SODIUM SERPL-SCNC: 140 MMOL/L (ref 136–145)
WBC # SPEC AUTO: 5.4 X10(3)/MCL (ref 4.5–11.5)

## 2023-01-25 PROCEDURE — 85025 COMPLETE CBC W/AUTO DIFF WBC: CPT | Performed by: EMERGENCY MEDICINE

## 2023-01-25 PROCEDURE — 90471 IMMUNIZATION ADMIN: CPT | Performed by: EMERGENCY MEDICINE

## 2023-01-25 PROCEDURE — 85730 THROMBOPLASTIN TIME PARTIAL: CPT | Performed by: EMERGENCY MEDICINE

## 2023-01-25 PROCEDURE — 80053 COMPREHEN METABOLIC PANEL: CPT | Performed by: EMERGENCY MEDICINE

## 2023-01-25 PROCEDURE — 83605 ASSAY OF LACTIC ACID: CPT | Performed by: EMERGENCY MEDICINE

## 2023-01-25 PROCEDURE — G0390 TRAUMA RESPONS W/HOSP CRITI: HCPCS

## 2023-01-25 PROCEDURE — 85610 PROTHROMBIN TIME: CPT | Performed by: EMERGENCY MEDICINE

## 2023-01-25 PROCEDURE — 90715 TDAP VACCINE 7 YRS/> IM: CPT | Performed by: EMERGENCY MEDICINE

## 2023-01-25 PROCEDURE — 82077 ASSAY SPEC XCP UR&BREATH IA: CPT | Performed by: EMERGENCY MEDICINE

## 2023-01-25 PROCEDURE — 63600175 PHARM REV CODE 636 W HCPCS: Performed by: EMERGENCY MEDICINE

## 2023-01-25 PROCEDURE — 86900 BLOOD TYPING SEROLOGIC ABO: CPT | Performed by: EMERGENCY MEDICINE

## 2023-01-25 PROCEDURE — 25500020 PHARM REV CODE 255: Performed by: EMERGENCY MEDICINE

## 2023-01-25 PROCEDURE — 99285 EMERGENCY DEPT VISIT HI MDM: CPT | Mod: 25

## 2023-01-25 RX ORDER — HYDROCODONE BITARTRATE AND ACETAMINOPHEN 5; 325 MG/1; MG/1
1 TABLET ORAL EVERY 6 HOURS PRN
Qty: 12 TABLET | Refills: 0 | Status: SHIPPED | OUTPATIENT
Start: 2023-01-25 | End: 2023-06-28 | Stop reason: ALTCHOICE

## 2023-01-25 RX ADMIN — IOPAMIDOL 100 ML: 755 INJECTION, SOLUTION INTRAVENOUS at 05:01

## 2023-01-25 RX ADMIN — TETANUS TOXOID, REDUCED DIPHTHERIA TOXOID AND ACELLULAR PERTUSSIS VACCINE, ADSORBED 0.5 ML: 5; 2.5; 8; 8; 2.5 SUSPENSION INTRAMUSCULAR at 05:01

## 2023-01-25 NOTE — ED PROVIDER NOTES
Encounter Date: 1/25/2023    SCRIBE #1 NOTE: I, Joshua Sauceda, am scribing for, and in the presence of,  Dr. Simms. I have scribed the following portions of the note - Other sections scribed: HPI, ROS, Physical Exam, MDM, Attending.     History     Chief Complaint   Patient presents with    Fall     Pt to ER via [facility] for fall.  Pt tripped and hit left eye, lac with bleeding controlled.      61 y/o AAF with history of HTN, DM and CVA on Plavix presents to ED via EMS and activated as level 2 trauma following a trip and fall in the parking lot at [employer].  Pt says she fell and hit her head and R knee.  She complains of HA, R knee pain and R arm pain with movement.  She denies neck pain, chest pain, syncope, hip pain or back pain.    The history is provided by the patient.   Fall  The accident occurred just prior to arrival. The fall occurred while walking. Distance fallen: ground level. The point of impact was the head and right knee. The pain is present in the head and right knee. Associated symptoms include headaches. Pertinent negatives include no neck pain, no back pain, no fever, no abdominal pain, no nausea, no vomiting and no hematuria. The symptoms are aggravated by use of the injured limb.   Review of patient's allergies indicates:  No Known Allergies  Past Medical History:   Diagnosis Date    Cataract     Diabetes mellitus     Glaucoma     Stroke 2014     Past Surgical History:   Procedure Laterality Date    COLONOSCOPY N/A 10/13/2022    Procedure: COLONOSCOPY;  Surgeon: Yusuf Iqbal MD;  Location: [facility] ENDOSCOPY;  Service: Endoscopy;  Laterality: N/A;  Hold Plavix 5 days- confirmed with pt-lw    HYSTERECTOMY  01/01/1984     Family History   Problem Relation Age of Onset    Diabetes Mother     Glaucoma Mother     Vision loss Maternal Grandmother     Hypertension Sister     No Known Problems Father     No Known Problems Brother     No Known Problems Maternal Aunt     No Known Problems Maternal  Uncle     No Known Problems Paternal Aunt     No Known Problems Paternal Uncle     No Known Problems Maternal Grandfather     No Known Problems Paternal Grandmother     No Known Problems Paternal Grandfather     Amblyopia Neg Hx     Blindness Neg Hx     Cancer Neg Hx     Cataracts Neg Hx     Macular degeneration Neg Hx     Retinal detachment Neg Hx     Strabismus Neg Hx     Stroke Neg Hx     Thyroid disease Neg Hx      Social History     Tobacco Use    Smoking status: Never    Smokeless tobacco: Never   Substance Use Topics    Alcohol use: Never    Drug use: Never     Review of Systems   Constitutional:  Negative for chills and fever.   HENT:  Negative for congestion, rhinorrhea and sore throat.    Eyes:  Negative for visual disturbance.   Respiratory:  Negative for cough and shortness of breath.    Cardiovascular:  Negative for chest pain and leg swelling.   Gastrointestinal:  Negative for abdominal pain, nausea and vomiting.   Genitourinary:  Negative for dysuria, hematuria, vaginal bleeding and vaginal discharge.   Musculoskeletal:  Positive for arthralgias (R knee) and myalgias (R arm). Negative for back pain, joint swelling and neck pain.   Skin:  Negative for rash.   Neurological:  Positive for headaches. Negative for syncope and weakness.   Psychiatric/Behavioral:  Negative for confusion.      Physical Exam     Initial Vitals [01/25/23 1645]   BP Pulse Resp Temp SpO2   134/88 61 18 98.1 °F (36.7 °C) 100 %      MAP       --         Physical Exam    Nursing note and vitals reviewed.  Constitutional: No distress.   HENT:   Head: Normocephalic and atraumatic.   Abrasion to L forehead; abrasion to L periorbital area; abrasion to L upper lip   Neck: Trachea normal.   Cardiovascular:  Normal rate and regular rhythm.           No murmur heard.  Pulmonary/Chest: Breath sounds normal. No respiratory distress.   Abdominal: Abdomen is soft. Bowel sounds are normal. She exhibits no distension. There is no abdominal  tenderness.   Musculoskeletal:         General: Tenderness (minimal R shoulder) present.      Lumbar back: Normal range of motion.      Comments: Difficulty moving R shoulder     Neurological: She is alert and oriented to person, place, and time. She has normal strength. No cranial nerve deficit. GCS score is 15. GCS eye subscore is 4. GCS verbal subscore is 5. GCS motor subscore is 6.   Skin: Skin is warm and dry. No rash noted.   Abrasion to extensor of L hand; small abrasion to R knee   Psychiatric: She has a normal mood and affect. Judgment normal.       ED Course   Procedures  Labs Reviewed   COMPREHENSIVE METABOLIC PANEL - Abnormal; Notable for the following components:       Result Value    Potassium Level 3.3 (*)     Carbon Dioxide 22 (*)     Blood Urea Nitrogen 23.7 (*)     Creatinine 1.16 (*)     Calcium Level Total 10.3 (*)     Protein Total 8.0 (*)     Globulin 3.9 (*)     All other components within normal limits   APTT - Abnormal; Notable for the following components:    PTT 21.9 (*)     All other components within normal limits   CBC WITH DIFFERENTIAL - Abnormal; Notable for the following components:    RBC 4.19 (*)     MPV 11.3 (*)     All other components within normal limits   PROTIME-INR - Normal   LACTIC ACID, PLASMA - Normal   ALCOHOL,MEDICAL (ETHANOL) - Normal   CBC W/ AUTO DIFFERENTIAL    Narrative:     The following orders were created for panel order CBC auto differential.  Procedure                               Abnormality         Status                     ---------                               -----------         ------                     CBC with Differential[[id number]]        Abnormal            Final result                 Please view results for these tests on the individual orders.   URINALYSIS, REFLEX TO URINE CULTURE   TYPE & SCREEN          Imaging Results              X-Ray Chest 1 View (Final result)  Result time 01/25/23 19:19:59      Final result by Arnaldo Waggoner MD  (01/25/23 19:19:59)                   Impression:      NO ACUTE CARDIOPULMONARY PROCESS IDENTIFIED.      Electronically signed by: Arnaldo Waggoner  Date:    01/25/2023  Time:    19:19               Narrative:    EXAMINATION:  XR CHEST 1 VIEW    CLINICAL HISTORY:  r/o bleeding or hemorrhage;    TECHNIQUE:  One view    FINDINGS:  Cardiopericardial silhouette is within normal limits. Lungs are without dense focal or segmental consolidation, congestive process, pleural effusions or pneumothorax.                                       X-Ray Shoulder Complete 2 View Right (Final result)  Result time 01/25/23 18:11:50      Final result by Arnaldo Waggoner MD (01/25/23 18:11:50)                   Impression:      Fractured greater tuberosity of the humerus.      Electronically signed by: Arnaldo Waggoner  Date:    01/25/2023  Time:    18:11               Narrative:    EXAMINATION:  XR SHOULDER COMPLETE 2 OR MORE VIEWS RIGHT    CLINICAL HISTORY:  Unspecified fall, initial encounter    TECHNIQUE:  Three views.    COMPARISON:  None available.    FINDINGS:  There is fractured greater tuberosity of the right humerus.  Humeral head is situated within the glenoid.  Mild degenerative changes of the acromioclavicular joint.                                       X-Ray Pelvis Routine AP (Final result)  Result time 01/25/23 18:13:59      Final result by Arnaldo Waggoner MD (01/25/23 18:13:59)                   Impression:      No acute fracture or dislocation identified.      Electronically signed by: Arnaldo Waggoner  Date:    01/25/2023  Time:    18:13               Narrative:    EXAMINATION:  XR PELVIS ROUTINE AP    CLINICAL HISTORY:  r/o bleeding or hemorrhage;    TECHNIQUE:  One view    FINDINGS:  Articular surfaces alignment is unremarkable.  No acute fracture or dislocation identified.  Sacroiliac joints mild degenerative osteoarthritic changes.                                       X-Ray Knee 3 View Right (Final result)  Result time 01/25/23  18:10:58      Final result by Arnaldo Waggoner MD (01/25/23 18:10:58)                   Impression:      No acute fracture or dislocation identified.      Electronically signed by: Arnaldo Waggoner  Date:    01/25/2023  Time:    18:10               Narrative:    EXAMINATION:  XR KNEE 3 VIEW RIGHT    CLINICAL HISTORY:  Unspecified fall, initial encounter    COMPARISON:  None available    FINDINGS:  There are right knee degenerative changes with more involvement medial joint compartment.  Articular surfaces alignment is preserved.  No acute fracture or dislocation identified.                                       CT Maxillofacial Without Contrast (Final result)  Result time 01/25/23 17:59:46      Final result by Arnaldo Waggoner MD (01/25/23 17:59:46)                   Impression:      1.    No acute maxillofacial fracture identified.      Electronically signed by: Arnaldo Waggoner  Date:    01/25/2023  Time:    17:59               Narrative:    EXAMINATION:  CT MAXILLOFACIAL WITHOUT CONTRAST    CLINICAL HISTORY:  Facial trauma;    TECHNIQUE:  Multidetector axial images were performed maxillofacial without contrast and images reformatted.    Dose length product of 1904 mGycm. Automated exposure control was utilized to minimize radiation dose.    COMPARISON:  None available    FINDINGS:  There is left forehead soft tissue inflammation.  There are no fractures of the orbital walls. The globes are unremarkable and no intra-orbital inflammations or emphysema identified. There are no fractures of the nasal bones, pterygoids, zygomatic arches, paranasal sinuses walls or the mandibles.    Right maxillary sinus large retention cyst.                                       CT Head Without Contrast (Final result)  Result time 01/25/23 17:53:34      Final result by Arnaldo Waggoner MD (01/25/23 17:53:34)                   Impression:      1.  Left frontal scalp inflammation.    2.  No acute intracranial traumatic findings  identified.      Electronically signed by: Arnaldo Waggoner  Date:    01/25/2023  Time:    17:53               Narrative:    EXAMINATION:  CT HEAD WITHOUT CONTRAST    CLINICAL HISTORY:  Trauma;    TECHNIQUE:  Sequential axial images were performed of the brain without contrast.    Dose product length of 1904 mGycm. Automated exposure control was utilized to minimize radiation dose.    COMPARISON:  August 8, 2016.    FINDINGS:  There is left frontal scalp inflammation.  No acute depressed skull fracture.  There is no intracranial mass effect, midline shift, hydrocephalus or hemorrhage.  Bilateral cerebral multiple old infarcts. There is no sulcal effacement or low attenuation changes to suggest recent large vessel territory infarction. Chronic appearing periventricular and subcortical white matter low attenuation changes are present and are consistent with chronic microangiopathic ischemia. The ventricular system and sulcal markings prominence is consistent with atrophy. There is no acute extra axial fluid collection.  Right maxillary sinus retention cyst.  Otherwise, visualized paranasal sinuses are clear without mucosal thickening, polypoidal abnormality or air-fluid levels. Mastoid air cells aeration is optimal.                                       CT Cervical Spine Without Contrast (Final result)  Result time 01/25/23 17:57:06      Final result by Arnaldo Waggoner MD (01/25/23 17:57:06)                   Impression:      No acute fracture or malalignment identified.      Electronically signed by: Arnaldo Waggoner  Date:    01/25/2023  Time:    17:57               Narrative:    EXAMINATION:  CT CERVICAL SPINE WITHOUT CONTRAST    CLINICAL HISTORY:  Trauma.    TECHNIQUE:  Multidetector axial images were performed of the cervical spine without and.  Images were reconstructed.    Automated exposure control was utilized to minimize radiation dose.  DLP 01/09/2004.    COMPARISON:  None available.    FINDINGS:  Cervical  vertebrae stature is maintained and alignment is unremarkable.  No acute fracture or malalignment identified.  There are osteophyte disc complexes throughout and facets arthropathy which cause ventral impression upon the thecal sac and narrowings of the neural foramen.   There is no prevertebral soft tissue prominence.    This study does not exclude the possibility of intrathecal soft tissue, ligamentous or vascular injury.                                       CT Chest Abdomen Pelvis With Contrast (xpd) (Final result)  Result time 01/25/23 18:09:24      Final result by Arnaldo Waggoner MD (01/25/23 18:09:24)                   Impression:      No traumatic injury of the thorax, abdomen or pelvis identified.      Electronically signed by: Arnaldo Waggoner  Date:    01/25/2023  Time:    18:09               Narrative:    EXAMINATION:  CT CHEST ABDOMEN PELVIS WITH CONTRAST (XPD)    CLINICAL HISTORY:  Trauma;    TECHNIQUE:  Multidetector axial images were obtained from the thoracic inlet through the greater trochanters following the administration of IV contrast.    Dose length product of 832 mGycm. Automated exposure control was utilized to minimize radiation dose.    COMPARISON:  None available.    CHEST FINDINGS:    The lungs are unremarkable without suspicious soft tissue pulmonary nodule, parenchyma consolidation, interstitial disease, pleural effusion or pneumothorax.    Images are partially degraded by respiratory misregistration. No traumatic finding of the thoracic great vessels identified and there are no dominant mediastinal hematomas. Thoracic spine alignment is preserved. No consistent findings reflective of a displaced fracture.    ABDOMINAL FINDINGS:    There is no abdominal solid parenchymal organs traumatic damage with unremarkable attenuation of the liver, pancreas and spleen. Gallbladder wall is not thickened and there is no intra luminal calcified calculus.    The adrenal glands size and configuration is  within normal limits. Kidneys are symmetric in size and exhibit symmetric contrast enhancement. No renal contusion or laceration identified. There is no hydronephrosis or perinephric fluid collection. The abdominal aorta is normal in course and diameter. No retroperitoneal hematoma. There is no extra luminal air.  There is fecal content throughout the colon.  No free fluid identified. Lumbar alignment is preserved.    PELVIC FINDINGS:    There is no free fluid. Urinary bladder appears within normal limits without wall thickening. No evidence for bladder rupture. Femoral heads are well situated within their respective acetabula. Pubic symphysis and SI joints are intact. No pelvic fracture identified.                                       X-Ray Hand 3 view Left (Final result)  Result time 01/25/23 18:52:26      Final result by Gilbert Young MD (01/25/23 18:52:26)                   Impression:      No acute osseous process appreciated.      Electronically signed by: Gilbert Young  Date:    01/25/2023  Time:    18:52               Narrative:    EXAMINATION:  XR HAND COMPLETE 3 VIEW LEFT    CLINICAL HISTORY:  fall;    TECHNIQUE:  PA, lateral, and oblique views of the left hand.    COMPARISON:  None    FINDINGS:  Mildly limited assessment due to positioning.  No acute fracture identified.  Joint alignments are maintained.                                       Medications   Tdap (BOOSTRIX) vaccine injection 0.5 mL (0.5 mLs Intramuscular Given 1/25/23 1718)   iopamidoL (ISOVUE-370) injection 100 mL (100 mLs Intravenous Given 1/25/23 1745)     Medical Decision Making:   Clinical Tests:   Lab Tests: Ordered and Reviewed  Radiological Study: Ordered and Reviewed        Scribe Attestation:   Scribe #1: I performed the above scribed service and the documentation accurately describes the services I performed. I attest to the accuracy of the note.    Attending Attestation:           Physician Attestation for Scribe:  Physician  Attestation Statement for Scribe #1: I, reviewed documentation, as scribed by Joshua Sauceda in my presence, and it is both accurate and complete.                    Medical Decision Making  Differential diagnoses include, but are not limited to: intracranial injury, skull fracture, facial fracture, abrasion, clavicle fracture, R arm fracture    Evaluated with CT head neck and face CT head neck and face without abnormality patient was a level 2 trauma secondary to her anticoagulants state    Problems Addressed:  Fracture of right shoulder, closed, initial encounter: complicated acute illness or injury that poses a threat to life or bodily functions     Details: Placed in shoulder sling and swath and will discharge          Clinical Impression:   Final diagnoses:  [W19.XXXA] Fall  [S42.91XA] Fracture of right shoulder, closed, initial encounter (Primary)        ED Disposition Condition    Discharge Stable          ED Prescriptions       Medication Sig Dispense Start Date End Date Auth. Provider    HYDROcodone-acetaminophen (NORCO) 5-325 mg per tablet Take 1 tablet by mouth every 6 (six) hours as needed for Pain. 12 tablet 1/25/2023 -- Jerod Simms III, MD          Follow-up Information       Follow up With Specialties Details Why Contact Info    Yvonne Adkins MD Internal Medicine In 3 days  [address] [facility] [address]  [phone]      Arnoldo Camacho MD Orthopedic Surgery In 1 week  [address] [facility] [address]  [phone]               Jerod Simms III, MD  01/25/23 5360

## 2023-01-26 ENCOUNTER — TELEPHONE (OUTPATIENT)
Dept: INTERNAL MEDICINE | Facility: CLINIC | Age: 63
End: 2023-01-26

## 2023-01-26 NOTE — TELEPHONE ENCOUNTER
Pt called stating she fell in parking lot at [email or web address] on yesterday, pt was transported to [facility]. Pt has a sling on (R) arm and also has a fracture to (R) shoulder. Pt also fell on her (R) knee and can't put any pressure on her legs. Pt requesting orders for a temporary wheel chair so she can get around because she can't put any pressure or stand on her legs.  Please Advise.

## 2023-02-01 ENCOUNTER — TELEPHONE (OUTPATIENT)
Dept: INTERNAL MEDICINE | Facility: CLINIC | Age: 63
End: 2023-02-01
Payer: MEDICARE

## 2023-02-03 NOTE — TELEPHONE ENCOUNTER
Pt called inquiring about her orders for a wheel chair. Pt states she has been calling and no one has returned her calls. Please Advise.

## 2023-02-07 ENCOUNTER — OFFICE VISIT (OUTPATIENT)
Dept: INTERNAL MEDICINE | Facility: CLINIC | Age: 63
End: 2023-02-07
Payer: MEDICARE

## 2023-02-07 DIAGNOSIS — S89.90XA KNEE INJURY, INITIAL ENCOUNTER: Primary | ICD-10-CM

## 2023-02-07 NOTE — PROGRESS NOTES
Established Patient - Audio Only Telehealth Visit     The patient location is: [place]  The chief complaint leading to consultation is: follow up  Visit type: Virtual visit with audio only (telephone)  Total time spent with patient: 30 minutes        The reason for the audio only service rather than synchronous audio and video virtual visit was related to technical difficulties or patient preference/necessity.     Each patient to whom I provide medical services by telemedicine is:  (1) informed of the relationship between the physician and patient and the respective role of any other health care provider with respect to management of the patient; and (2) notified that they may decline to receive medical services by telemedicine and may withdraw from such care at any time. Patient verbally consented to receive this service via voice-only telephone call.       HPI: Alyssa Anderson is a 61 y.o. yo female non-insulin diabetes mellitus type II, and CVA.  Patient was previously seen by another provider at this clinic.  Last hemoglobin A1c 5.1, currently on Metformin 500 mg daily.  She currently follows Neurology due to history of stroke, currently on Plavix and aspirin. No new symptoms. Patient has residual symptoms: expressive aphasia.  She reports that she had seizures in the past and was placed on Keppra by Neurology. She states that she stopped taking the medication and started having seizures. She started the medication again and reports she has not had a seizure in many years.  Patient states that she fell in the parking lot.   Patient denies chest pain, palpitations, and shortness of breath.   Patient denies fever, night sweats, chills, nausea, vomiting, diarrhea, constipation, weight loss, and changes in appetite.     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 Diabetic fundus photos no retinopathy, last appointment with Ophthalmology 10/2021. Monofilament examination was normal 4/2022, next screening 4/2023. Lifestyle modifications.  Avoid nephrotoxic drugs.  Continue medications Lisinopril 40 mg daily, Norvasc 10 mg daily, and Hydrochlorothiazide 12.5 mg daily. Patient reports systolic blood pressure 100-115.  Patient reports no new seizures. Continue Keppra. Patient is on 500 mg BID per last note. Continue to follow Neurology. Neurology has recommended to continue Plavix and Aspirin.  Completed prescription, recheck at a later date.  Controlled with Effexor, which she was started on by another provider.  Continue to follow up with Ophthalmology and continue eyedrops.      Return to clinic in 3 months.     Yvonne Adkins MD  [facility] - Internal Medicine       This service was not originating from a related E/M service provided within the previous 7 days nor will  to an E/M service or procedure within the next 24 hours or my soonest available appointment.  Prevailing standard of care was able to be met in this audio-only visit.

## 2023-02-08 ENCOUNTER — PATIENT MESSAGE (OUTPATIENT)
Dept: RESEARCH | Facility: HOSPITAL | Age: 63
End: 2023-02-08
Payer: MEDICARE

## 2023-02-13 ENCOUNTER — OFFICE VISIT (OUTPATIENT)
Dept: ORTHOPEDICS | Facility: CLINIC | Age: 63
End: 2023-02-13
Payer: MEDICARE

## 2023-02-13 ENCOUNTER — HOSPITAL ENCOUNTER (OUTPATIENT)
Dept: RADIOLOGY | Facility: HOSPITAL | Age: 63
Discharge: HOME | End: 2023-02-13
Attending: NURSE PRACTITIONER
Payer: MEDICARE

## 2023-02-13 ENCOUNTER — HOSPITAL ENCOUNTER (OUTPATIENT)
Dept: RADIOLOGY | Facility: CLINIC | Age: 63
Discharge: HOME | End: 2023-02-13
Attending: ORTHOPAEDIC SURGERY
Payer: MEDICARE

## 2023-02-13 VITALS
DIASTOLIC BLOOD PRESSURE: 76 MMHG | WEIGHT: 152 LBS | HEIGHT: 67 IN | TEMPERATURE: 99 F | HEART RATE: 68 BPM | BODY MASS INDEX: 23.86 KG/M2 | SYSTOLIC BLOOD PRESSURE: 114 MMHG

## 2023-02-13 DIAGNOSIS — S42.294A OTHER CLOSED NONDISPLACED FRACTURE OF PROXIMAL END OF RIGHT HUMERUS, INITIAL ENCOUNTER: Primary | ICD-10-CM

## 2023-02-13 DIAGNOSIS — S42.254A NONDISPLACED FRACTURE OF GREATER TUBEROSITY OF RIGHT HUMERUS, INITIAL ENCOUNTER FOR CLOSED FRACTURE: ICD-10-CM

## 2023-02-13 DIAGNOSIS — S42.294A OTHER CLOSED NONDISPLACED FRACTURE OF PROXIMAL END OF RIGHT HUMERUS, INITIAL ENCOUNTER: ICD-10-CM

## 2023-02-13 PROCEDURE — 3078F DIAST BP <80 MM HG: CPT | Mod: CPTII,,, | Performed by: NURSE PRACTITIONER

## 2023-02-13 PROCEDURE — 3072F PR LOW RISK FOR RETINOPATHY: ICD-10-PCS | Mod: CPTII,,, | Performed by: NURSE PRACTITIONER

## 2023-02-13 PROCEDURE — 1159F MED LIST DOCD IN RCRD: CPT | Mod: CPTII,,, | Performed by: NURSE PRACTITIONER

## 2023-02-13 PROCEDURE — 73030 X-RAY EXAM OF SHOULDER: CPT | Mod: RT,,, | Performed by: ORTHOPAEDIC SURGERY

## 2023-02-13 PROCEDURE — 99203 PR OFFICE/OUTPT VISIT, NEW, LEVL III, 30-44 MIN: ICD-10-PCS | Mod: ,,, | Performed by: NURSE PRACTITIONER

## 2023-02-13 PROCEDURE — 99203 OFFICE O/P NEW LOW 30 MIN: CPT | Mod: ,,, | Performed by: NURSE PRACTITIONER

## 2023-02-13 PROCEDURE — 3074F PR MOST RECENT SYSTOLIC BLOOD PRESSURE < 130 MM HG: ICD-10-PCS | Mod: CPTII,,, | Performed by: NURSE PRACTITIONER

## 2023-02-13 PROCEDURE — 3008F PR BODY MASS INDEX (BMI) DOCUMENTED: ICD-10-PCS | Mod: CPTII,,, | Performed by: NURSE PRACTITIONER

## 2023-02-13 PROCEDURE — 3078F PR MOST RECENT DIASTOLIC BLOOD PRESSURE < 80 MM HG: ICD-10-PCS | Mod: CPTII,,, | Performed by: NURSE PRACTITIONER

## 2023-02-13 PROCEDURE — 3008F BODY MASS INDEX DOCD: CPT | Mod: CPTII,,, | Performed by: NURSE PRACTITIONER

## 2023-02-13 PROCEDURE — 3074F SYST BP LT 130 MM HG: CPT | Mod: CPTII,,, | Performed by: NURSE PRACTITIONER

## 2023-02-13 PROCEDURE — 1159F PR MEDICATION LIST DOCUMENTED IN MEDICAL RECORD: ICD-10-PCS | Mod: CPTII,,, | Performed by: NURSE PRACTITIONER

## 2023-02-13 PROCEDURE — 73030 XR SHOULDER COMPLETE 2 OR MORE VIEWS RIGHT: ICD-10-PCS | Mod: RT,,, | Performed by: ORTHOPAEDIC SURGERY

## 2023-02-13 PROCEDURE — 3072F LOW RISK FOR RETINOPATHY: CPT | Mod: CPTII,,, | Performed by: NURSE PRACTITIONER

## 2023-02-13 PROCEDURE — 73200 CT UPPER EXTREMITY W/O DYE: CPT | Mod: TC,RT

## 2023-02-13 NOTE — PROGRESS NOTES
Subjective:       Patient ID: Alyssa Anderson is a 62 y.o. female.    Chief Complaint   Patient presents with    Right Shoulder - Follow-up     2.5 WEEK RIGHT PROXIMAL HUMERUS FX. WENT TO ER 1/25/23. COMPLAINS OF PAIN WITH MOVEMENT.         The patient is a 62-year-old female.  She states that she tripped on uneven concrete and fell landing on outstretched right upper extremity 2-1/2 weeks ago.  She was evaluated in ER and found to have a right proximal humerus fracture.  She has been in a sling and swath.  Her pain is controlled at rest but she does have pain in the shoulder with attempts at movement.  She also states that she landed on her knee.  X-rays were performed at the time of her injury and did not display any acute fracture.  She has soreness to the medial aspect of her knee with weight-bearing.  No other issues or complaints were reported today.  She does report that she has a history of a stroke with weakness on the left side of her body, making her right side her dominant side.      Review of Systems   Constitutional: Negative for chills and fever.   HENT:  Negative for congestion and hearing loss.    Eyes:  Negative for visual disturbance.   Cardiovascular:  Negative for chest pain and syncope.   Respiratory:  Negative for cough and shortness of breath.    Hematologic/Lymphatic: Does not bruise/bleed easily.   Skin:  Negative for color change and rash.   Gastrointestinal:  Negative for abdominal pain, nausea and vomiting.   Genitourinary:  Negative for dysuria and hematuria.   Neurological:  Negative for numbness, sensory change and weakness.   Psychiatric/Behavioral:  Negative for altered mental status.       Current Outpatient Medications on File Prior to Visit   Medication Sig Dispense Refill    amLODIPine (NORVASC) 10 MG tablet Take 1 tablet (10 mg total) by mouth once daily. 90 tablet 2    aspirin (ECOTRIN) 81 MG EC tablet Take 81 mg by mouth once daily.      atorvastatin (LIPITOR) 20 MG tablet  "Take 1 tablet (20 mg total) by mouth once daily. 90 tablet 1    brimonidine 0.15 % OPTH DROP (ALPHAGAN) 0.15 % ophthalmic solution Place 1 drop into both eyes 3 (three) times daily.      brimonidine 0.2% (ALPHAGAN) 0.2 % Drop Place 1 drop into both eyes 3 (three) times daily. 18 mL 4    clopidogreL (PLAVIX) 75 mg tablet Take 75 mg by mouth once daily.      dorzolamide-timolol 2-0.5% (COSOPT) 22.3-6.8 mg/mL ophthalmic solution Place 1 drop into both eyes 2 (two) times daily. 2 each 2    hydroCHLOROthiazide (HYDRODIURIL) 12.5 MG Tab Take 1 tablet (12.5 mg total) by mouth once daily. 90 tablet 2    HYDROcodone-acetaminophen (NORCO) 5-325 mg per tablet Take 1 tablet by mouth every 6 (six) hours as needed for Pain. 12 tablet 0    latanoprost 0.005 % ophthalmic solution Place 1 drop into both eyes every evening. 6 mL 4    levETIRAcetam (KEPPRA) 500 MG Tab Take 1 tablet (500 mg total) by mouth 2 (two) times daily. 90 tablet 2    lisinopriL (PRINIVIL,ZESTRIL) 40 MG tablet Take 1 tablet (40 mg total) by mouth once daily. 90 tablet 2    metFORMIN (GLUCOPHAGE) 500 MG tablet Take 1 tablet (500 mg total) by mouth daily with breakfast. 90 tablet 2    MOVIPREP 100-7.5-2.691 gram solution Take by mouth.      venlafaxine (EFFEXOR-XR) 75 MG 24 hr capsule Take 75 mg by mouth once daily.       No current facility-administered medications on file prior to visit.          Objective:      /76   Pulse 68   Temp 98.5 °F (36.9 °C)   Ht 5' 7" (1.702 m)   Wt 68.9 kg (152 lb)   BMI 23.81 kg/m²   Physical Exam  Constitutional:       General: She is not in acute distress.     Appearance: Normal appearance.   HENT:      Head: Normocephalic and atraumatic.      Mouth/Throat:      Mouth: Mucous membranes are moist.   Eyes:      Extraocular Movements: Extraocular movements intact.   Cardiovascular:      Rate and Rhythm: Normal rate.      Pulses: Normal pulses.   Pulmonary:      Effort: Pulmonary effort is normal. No respiratory distress. "   Abdominal:      General: There is no distension.      Palpations: Abdomen is soft.      Tenderness: There is no abdominal tenderness.   Musculoskeletal:      Cervical back: Normal range of motion and neck supple.      Comments: Right upper extremity:  Minimal swelling to the shoulder.  No deformity.  Skin is intact with no abrasions or open wounds.  She has a sling in place and no shoulder range of motion was attempted.  Upper extremity compartments are soft and compressible.  She has no tenderness over the elbow, forearm, wrist.  She is neurovascularly intact distally.      Right knee:  No swelling or deformity noted.  She has a small bruise to the medial aspect of the knee which is mildly tender to palpation.  She has no laxity with varus or valgus stress.  Range of motion 0-120 degrees.  She can hold a straight leg raise.  She is no calf swelling or tenderness or signs of DVT.  She is neurovascularly intact distally.   Neurological:      Mental Status: She is alert and oriented to person, place, and time. Mental status is at baseline.   Psychiatric:         Mood and Affect: Mood normal.         Behavior: Behavior normal.         Thought Content: Thought content normal.         Judgment: Judgment normal.      Body mass index is 23.81 kg/m².    Radiology:  Three-view x-ray right shoulder done in the office today demonstrates a nondisplaced right greater tuberosity fracture    Three-view x-ray right knee done in the emergency room demonstrates no acute fracture or dislocation.  She does have degenerative changes noted.        Assessment:         1. Other closed nondisplaced fracture of proximal end of right humerus, initial encounter  X-ray Shoulder 2 or More Views Right      2. Nondisplaced fracture of greater tuberosity of right humerus, initial encounter for closed fracture  CT Shoulder Without Contrast Right              Plan:       Patient has a nondisplaced right greater tuberosity fracture that is now 2 and  half weeks out from her injury.  Dr. Camacho has discussed her case with our shoulder specialist Dr. Carlos Turner.  We will plan to send her for a CT scan of her shoulder today.  She will then follow-up with Dr. Turner on Wednesday for results.  If her fracture is amenable, he will plan for operative stabilization on Thursday.  She will remain nonweightbearing to the right upper extremity and continue her sling until then.    She appears to have her a contusion to her right knee.  She has degenerative changes on her x-rays but no acute fracture or other bony abnormalities.  Recommend supportive treatment with ice packs, stretching exercises, and continued monitoring.  She may perform activity as tolerated to the right lower extremity.    All questions and concerns were addressed with the patient and family.  They understand and agree with the plan of care.    The above findings, diagnosis, and treatment plan were discussed with Dr. Arnoldo Camacho who is in agreement and has personally examined the patient today.     No follow-ups on file.    Other closed nondisplaced fracture of proximal end of right humerus, initial encounter  -     X-ray Shoulder 2 or More Views Right; Future; Expected date: 02/13/2023    Nondisplaced fracture of greater tuberosity of right humerus, initial encounter for closed fracture  -     CT Shoulder Without Contrast Right; Future; Expected date: 02/13/2023              Orders Placed This Encounter   Procedures    X-ray Shoulder 2 or More Views Right     Standing Status:   Future     Number of Occurrences:   1     Standing Expiration Date:   2/13/2024     Order Specific Question:   May the Radiologist modify the order per protocol to meet the clinical needs of the patient?     Answer:   Yes     Order Specific Question:   Release to patient     Answer:   Immediate    CT Shoulder Without Contrast Right     Standing Status:   Future     Standing Expiration Date:   2/13/2024     Order Specific  Question:   May the Radiologist modify the order per protocol to meet the clinical needs of the patient?     Answer:   Yes       Future Appointments   Date Time Provider Department Center   3/21/2023  2:20 PM [facility]    4/26/2023  1:30 PM [facility]

## 2023-02-15 ENCOUNTER — OFFICE VISIT (OUTPATIENT)
Dept: ORTHOPEDICS | Facility: CLINIC | Age: 63
End: 2023-02-15
Payer: MEDICARE

## 2023-02-15 VITALS
HEART RATE: 68 BPM | DIASTOLIC BLOOD PRESSURE: 76 MMHG | WEIGHT: 152 LBS | SYSTOLIC BLOOD PRESSURE: 114 MMHG | BODY MASS INDEX: 23.86 KG/M2 | HEIGHT: 67 IN

## 2023-02-15 DIAGNOSIS — S42.294A OTHER CLOSED NONDISPLACED FRACTURE OF PROXIMAL END OF RIGHT HUMERUS, INITIAL ENCOUNTER: Primary | ICD-10-CM

## 2023-02-15 PROCEDURE — 99213 OFFICE O/P EST LOW 20 MIN: CPT | Mod: 57,,, | Performed by: ORTHOPAEDIC SURGERY

## 2023-02-15 PROCEDURE — 3074F SYST BP LT 130 MM HG: CPT | Mod: CPTII,,, | Performed by: ORTHOPAEDIC SURGERY

## 2023-02-15 PROCEDURE — 3072F PR LOW RISK FOR RETINOPATHY: ICD-10-PCS | Mod: CPTII,,, | Performed by: ORTHOPAEDIC SURGERY

## 2023-02-15 PROCEDURE — 3078F DIAST BP <80 MM HG: CPT | Mod: CPTII,,, | Performed by: ORTHOPAEDIC SURGERY

## 2023-02-15 PROCEDURE — 3074F PR MOST RECENT SYSTOLIC BLOOD PRESSURE < 130 MM HG: ICD-10-PCS | Mod: CPTII,,, | Performed by: ORTHOPAEDIC SURGERY

## 2023-02-15 PROCEDURE — 3078F PR MOST RECENT DIASTOLIC BLOOD PRESSURE < 80 MM HG: ICD-10-PCS | Mod: CPTII,,, | Performed by: ORTHOPAEDIC SURGERY

## 2023-02-15 PROCEDURE — 1159F MED LIST DOCD IN RCRD: CPT | Mod: CPTII,,, | Performed by: ORTHOPAEDIC SURGERY

## 2023-02-15 PROCEDURE — 99213 PR OFFICE/OUTPT VISIT, EST, LEVL III, 20-29 MIN: ICD-10-PCS | Mod: 57,,, | Performed by: ORTHOPAEDIC SURGERY

## 2023-02-15 PROCEDURE — 1159F PR MEDICATION LIST DOCUMENTED IN MEDICAL RECORD: ICD-10-PCS | Mod: CPTII,,, | Performed by: ORTHOPAEDIC SURGERY

## 2023-02-15 PROCEDURE — 3008F PR BODY MASS INDEX (BMI) DOCUMENTED: ICD-10-PCS | Mod: CPTII,,, | Performed by: ORTHOPAEDIC SURGERY

## 2023-02-15 PROCEDURE — 23600 CLTX PROX HUMRL FX W/O MNPJ: CPT | Mod: RT,,, | Performed by: ORTHOPAEDIC SURGERY

## 2023-02-15 PROCEDURE — 3072F LOW RISK FOR RETINOPATHY: CPT | Mod: CPTII,,, | Performed by: ORTHOPAEDIC SURGERY

## 2023-02-15 PROCEDURE — 3008F BODY MASS INDEX DOCD: CPT | Mod: CPTII,,, | Performed by: ORTHOPAEDIC SURGERY

## 2023-02-15 PROCEDURE — 23600 PR CLOSED RX PROX HUMERUS FRACTURE: ICD-10-PCS | Mod: RT,,, | Performed by: ORTHOPAEDIC SURGERY

## 2023-02-15 NOTE — PROGRESS NOTES
Chief Complaint:   Chief Complaint   Patient presents with    Right Shoulder - Pain    Shoulder Pain     referral from Dr Camacho, has CT done for right shoulder, is not in a lot of pain       Consulting Physician: No ref. provider found    History of present illness:  The patient is a 62-year-old female.  She states that she tripped on uneven concrete and fell landing on outstretched right upper extremity 1/25/2023.  She was evaluated in ER and found to have a right proximal humerus fracture.  She has been in a sling and swath.  Her pain is controlled at rest but she does have pain in the shoulder with attempts at movement.  She also states that she landed on her knee.  X-rays were performed at the time of her injury and did not display any acute fracture.  She has soreness to the medial aspect of her knee with weight-bearing.  No other issues or complaints were reported today.  She does report that she has a history of a stroke with weakness on the left side of her body, making her right side her dominant side.    She returns today status post CT scan.    Past Medical History:   Diagnosis Date    Cataract     Diabetes mellitus     Glaucoma     Stroke 2014       Past Surgical History:   Procedure Laterality Date    COLONOSCOPY N/A 10/13/2022    Procedure: COLONOSCOPY;  Surgeon: Yusuf Iqbal MD;  Location: [facility] ENDOSCOPY;  Service: Endoscopy;  Laterality: N/A;  Hold Plavix 5 days- confirmed with pt-lw    HYSTERECTOMY  01/01/1984       Current Outpatient Medications   Medication Sig    amLODIPine (NORVASC) 10 MG tablet Take 1 tablet (10 mg total) by mouth once daily.    aspirin (ECOTRIN) 81 MG EC tablet Take 81 mg by mouth once daily.    atorvastatin (LIPITOR) 20 MG tablet Take 1 tablet (20 mg total) by mouth once daily.    brimonidine 0.15 % OPTH DROP (ALPHAGAN) 0.15 % ophthalmic solution Place 1 drop into both eyes 3 (three) times daily.    brimonidine 0.2% (ALPHAGAN) 0.2 % Drop Place 1 drop into both eyes 3  (three) times daily.    clopidogreL (PLAVIX) 75 mg tablet Take 75 mg by mouth once daily.    dorzolamide-timolol 2-0.5% (COSOPT) 22.3-6.8 mg/mL ophthalmic solution Place 1 drop into both eyes 2 (two) times daily.    hydroCHLOROthiazide (HYDRODIURIL) 12.5 MG Tab Take 1 tablet (12.5 mg total) by mouth once daily.    HYDROcodone-acetaminophen (NORCO) 5-325 mg per tablet Take 1 tablet by mouth every 6 (six) hours as needed for Pain.    latanoprost 0.005 % ophthalmic solution Place 1 drop into both eyes every evening.    levETIRAcetam (KEPPRA) 500 MG Tab Take 1 tablet (500 mg total) by mouth 2 (two) times daily.    lisinopriL (PRINIVIL,ZESTRIL) 40 MG tablet Take 1 tablet (40 mg total) by mouth once daily.    metFORMIN (GLUCOPHAGE) 500 MG tablet Take 1 tablet (500 mg total) by mouth daily with breakfast.    MOVIPREP 100-7.5-2.691 gram solution Take by mouth.    venlafaxine (EFFEXOR-XR) 75 MG 24 hr capsule Take 75 mg by mouth once daily.     No current facility-administered medications for this visit.       Review of patient's allergies indicates:  No Known Allergies    Family History   Problem Relation Age of Onset    Diabetes Mother     Glaucoma Mother     Vision loss Maternal Grandmother     Hypertension Sister     No Known Problems Father     No Known Problems Brother     No Known Problems Maternal Aunt     No Known Problems Maternal Uncle     No Known Problems Paternal Aunt     No Known Problems Paternal Uncle     No Known Problems Maternal Grandfather     No Known Problems Paternal Grandmother     No Known Problems Paternal Grandfather     Amblyopia Neg Hx     Blindness Neg Hx     Cancer Neg Hx     Cataracts Neg Hx     Macular degeneration Neg Hx     Retinal detachment Neg Hx     Strabismus Neg Hx     Stroke Neg Hx     Thyroid disease Neg Hx        Social History     Socioeconomic History    Marital status: Single   Tobacco Use    Smoking status: Never    Smokeless tobacco: Never   Substance and Sexual Activity     "Alcohol use: Never    Drug use: Never    Sexual activity: Not Currently     Social Determinants of Health     Financial Resource Strain: Low Risk     Difficulty of Paying Living Expenses: Not very hard   Food Insecurity: No Food Insecurity    Worried About Running Out of Food in the Last Year: Never true    Ran Out of Food in the Last Year: Never true   Transportation Needs: No Transportation Needs    Lack of Transportation (Medical): No    Lack of Transportation (Non-Medical): No   Physical Activity: Sufficiently Active    Days of Exercise per Week: 6 days    Minutes of Exercise per Session: 60 min   Stress: No Stress Concern Present    Feeling of Stress : Not at all   Social Connections: Moderately Integrated    Frequency of Communication with Friends and Family: Twice a week    Frequency of Social Gatherings with Friends and Family: Once a week    Attends Yarsani Services: More than 4 times per year    Active Member of Clubs or Organizations: No    Attends Club or Organization Meetings: Never    Marital Status:    Housing Stability: Low Risk     Unable to Pay for Housing in the Last Year: No    Number of Places Lived in the Last Year: 1    Unstable Housing in the Last Year: No       Review of Systems:    Constitution:   Denies chills, fever, and sweats.  HENT:   Denies headaches or blurry vision.  Cardiovascular:  Denies chest pain or irregular heart beat.  Respiratory:   Denies cough or shortness of breath.  Gastrointestinal:  Denies abdominal pain, nausea, or vomiting.  Musculoskeletal:   Denies muscle cramps.  Neurological:   Denies dizziness or focal weakness.  Psychiatric/Behavior: Normal mental status.  Hematology/Lymph:  Denies bleeding problem or easy bruising/bleeding.  Skin:    Denies rash or suspicious lesions.    Examination:    Vital Signs:    Vitals:    02/15/23 1024   BP: 114/76   Pulse: 68   Weight: 68.9 kg (152 lb)   Height: 5' 7" (1.702 m)   PainSc:   1       Body mass index is 23.81 " kg/m².    Constitution:   Well-developed, well nourished patient in no acute distress.  Neurological:   Alert and oriented x 3 and cooperative to examination.     Psychiatric/Behavior: Normal mental status.  Respiratory:   No shortness of breath.  Eyes:    Extraoccular muscles intact  Skin:    No scars, rash or suspicious lesions.    MSK:   Shoulder Exam:   She has some resolving ecchymosis and swelling around the shoulder.  +AIN, PIN, radius, ulna.  Sensation intact to light touch.  +radial pulse    Imaging:  CT scan was reviewed which shows minimally displaced proximal humerus fracture.     Assessment: Other closed nondisplaced fracture of proximal end of right humerus, initial encounter        Plan:  Proximal humerus fracture continues to look nondisplaced on her CT scan.  We are going to pursue nonoperative treatment.  Continue in the sling for the next 2 weeks.  I will see her back in 2 weeks for radiographs of the right shoulder

## 2023-02-27 DIAGNOSIS — I10 HYPERTENSION, UNSPECIFIED TYPE: ICD-10-CM

## 2023-02-27 DIAGNOSIS — R56.9 SEIZURE: ICD-10-CM

## 2023-03-01 ENCOUNTER — HOSPITAL ENCOUNTER (OUTPATIENT)
Dept: RADIOLOGY | Facility: CLINIC | Age: 63
Discharge: HOME | End: 2023-03-01
Attending: NURSE PRACTITIONER
Payer: MEDICARE

## 2023-03-01 ENCOUNTER — OFFICE VISIT (OUTPATIENT)
Dept: ORTHOPEDICS | Facility: CLINIC | Age: 63
End: 2023-03-01
Payer: MEDICARE

## 2023-03-01 VITALS
SYSTOLIC BLOOD PRESSURE: 138 MMHG | HEART RATE: 64 BPM | HEIGHT: 67 IN | DIASTOLIC BLOOD PRESSURE: 88 MMHG | WEIGHT: 152 LBS | BODY MASS INDEX: 23.86 KG/M2

## 2023-03-01 DIAGNOSIS — S42.294D OTHER CLOSED NONDISPLACED FRACTURE OF PROXIMAL END OF RIGHT HUMERUS WITH ROUTINE HEALING, SUBSEQUENT ENCOUNTER: Primary | ICD-10-CM

## 2023-03-01 DIAGNOSIS — S42.294A OTHER CLOSED NONDISPLACED FRACTURE OF PROXIMAL END OF RIGHT HUMERUS, INITIAL ENCOUNTER: ICD-10-CM

## 2023-03-01 PROCEDURE — 3008F BODY MASS INDEX DOCD: CPT | Mod: CPTII,,, | Performed by: NURSE PRACTITIONER

## 2023-03-01 PROCEDURE — 3072F LOW RISK FOR RETINOPATHY: CPT | Mod: CPTII,,, | Performed by: NURSE PRACTITIONER

## 2023-03-01 PROCEDURE — 3075F PR MOST RECENT SYSTOLIC BLOOD PRESS GE 130-139MM HG: ICD-10-PCS | Mod: CPTII,,, | Performed by: NURSE PRACTITIONER

## 2023-03-01 PROCEDURE — 1159F MED LIST DOCD IN RCRD: CPT | Mod: CPTII,,, | Performed by: NURSE PRACTITIONER

## 2023-03-01 PROCEDURE — 3079F PR MOST RECENT DIASTOLIC BLOOD PRESSURE 80-89 MM HG: ICD-10-PCS | Mod: CPTII,,, | Performed by: NURSE PRACTITIONER

## 2023-03-01 PROCEDURE — 4010F ACE/ARB THERAPY RXD/TAKEN: CPT | Mod: CPTII,,, | Performed by: NURSE PRACTITIONER

## 2023-03-01 PROCEDURE — 99024 PR POST-OP FOLLOW-UP VISIT: ICD-10-PCS | Mod: ,,, | Performed by: NURSE PRACTITIONER

## 2023-03-01 PROCEDURE — 3075F SYST BP GE 130 - 139MM HG: CPT | Mod: CPTII,,, | Performed by: NURSE PRACTITIONER

## 2023-03-01 PROCEDURE — 73030 XR SHOULDER COMPLETE 2 OR MORE VIEWS RIGHT: ICD-10-PCS | Mod: RT,,, | Performed by: NURSE PRACTITIONER

## 2023-03-01 PROCEDURE — 73030 X-RAY EXAM OF SHOULDER: CPT | Mod: RT,,, | Performed by: NURSE PRACTITIONER

## 2023-03-01 PROCEDURE — 3008F PR BODY MASS INDEX (BMI) DOCUMENTED: ICD-10-PCS | Mod: CPTII,,, | Performed by: NURSE PRACTITIONER

## 2023-03-01 PROCEDURE — 1159F PR MEDICATION LIST DOCUMENTED IN MEDICAL RECORD: ICD-10-PCS | Mod: CPTII,,, | Performed by: NURSE PRACTITIONER

## 2023-03-01 PROCEDURE — 99024 POSTOP FOLLOW-UP VISIT: CPT | Mod: ,,, | Performed by: NURSE PRACTITIONER

## 2023-03-01 PROCEDURE — 4010F PR ACE/ARB THEARPY RXD/TAKEN: ICD-10-PCS | Mod: CPTII,,, | Performed by: NURSE PRACTITIONER

## 2023-03-01 PROCEDURE — 3072F PR LOW RISK FOR RETINOPATHY: ICD-10-PCS | Mod: CPTII,,, | Performed by: NURSE PRACTITIONER

## 2023-03-01 PROCEDURE — 3079F DIAST BP 80-89 MM HG: CPT | Mod: CPTII,,, | Performed by: NURSE PRACTITIONER

## 2023-03-01 NOTE — PROGRESS NOTES
Chief Complaint:   Chief Complaint   Patient presents with    Right Shoulder - Pain    Shoulder Pain     5 wk out from right prox humerus fx DOI 1/25/23, patient presents today with no pain but does struggle to find a comfortable sleeping position, is still in sling       History of present illness:  1/25/23: Right Proximal Humerus Fracture, nonoperative treatment    She returns today. Her pain is under good control. She has been compliant in the sling and swathe.     Musculoskeletal:   Right shoulder without obvious deformity. Non tender to palpation. ROM limited.     Imaging: X-rays ordered and images interpreted today personally by me of 3 views of her right shoulder show interval fracture healing       Assessment: Other closed nondisplaced fracture of proximal end of right humerus with routine healing, subsequent encounter  -     X-ray Shoulder 2 or More Views Right; Future; Expected date: 03/01/2023        Plan:  Doing well s/p above. She can discontinue the sling next week. We will start her in Home health therapy. Follow up in 3-4 weeks with xrays of the right shoulder.

## 2023-03-02 RX ORDER — LISINOPRIL 40 MG/1
40 TABLET ORAL DAILY
Qty: 90 TABLET | Refills: 2 | Status: SHIPPED | OUTPATIENT
Start: 2023-03-02 | End: 2023-06-20 | Stop reason: SDUPTHER

## 2023-03-02 RX ORDER — CLOPIDOGREL BISULFATE 75 MG/1
75 TABLET ORAL DAILY
Qty: 90 TABLET | Refills: 1 | Status: SHIPPED | OUTPATIENT
Start: 2023-03-02 | End: 2023-06-20 | Stop reason: SDUPTHER

## 2023-03-02 RX ORDER — LEVETIRACETAM 500 MG/1
500 TABLET ORAL 2 TIMES DAILY
Qty: 90 TABLET | Refills: 2 | Status: SHIPPED | OUTPATIENT
Start: 2023-03-02 | End: 2023-06-20 | Stop reason: SDUPTHER

## 2023-03-03 PROCEDURE — G0180 PR HOME HEALTH MD CERTIFICATION: ICD-10-PCS | Mod: ,,, | Performed by: ORTHOPAEDIC SURGERY

## 2023-03-03 PROCEDURE — G0180 MD CERTIFICATION HHA PATIENT: HCPCS | Mod: ,,, | Performed by: ORTHOPAEDIC SURGERY

## 2023-03-07 RX ORDER — VENLAFAXINE HYDROCHLORIDE 75 MG/1
75 CAPSULE, EXTENDED RELEASE ORAL DAILY
Qty: 30 CAPSULE | Refills: 4 | Status: SHIPPED | OUTPATIENT
Start: 2023-03-07 | End: 2023-06-20 | Stop reason: SDUPTHER

## 2023-03-20 ENCOUNTER — TELEPHONE (OUTPATIENT)
Dept: INTERNAL MEDICINE | Facility: CLINIC | Age: 63
End: 2023-03-20
Payer: MEDICARE

## 2023-03-20 NOTE — TELEPHONE ENCOUNTER
Please review and advise if audio appointment is approved. If approve please notify Tanvi Judge.  Thanks

## 2023-03-20 NOTE — TELEPHONE ENCOUNTER
----- Message from Kelly Dennison sent at 3/18/2023 10:40 AM CDT -----  Type:  Patient Returning Call    Who Called:pt   Who Left Message for Patient:  Does the patient know what this is regarding?:appt   Would the patient rather a call back or a response via [facility]? Call   Best Call Back Number:[phone]  Additional Information: pt states it is a struggle to dress and do much of anything and would like to know if appt can be changed to an audio appt. States that she does not really know how to work [facility].

## 2023-03-21 ENCOUNTER — OFFICE VISIT (OUTPATIENT)
Dept: INTERNAL MEDICINE | Facility: CLINIC | Age: 63
End: 2023-03-21
Payer: MEDICARE

## 2023-03-21 DIAGNOSIS — G40.909 SEIZURE DISORDER: ICD-10-CM

## 2023-03-21 DIAGNOSIS — E11.22 TYPE 2 DIABETES MELLITUS WITH STAGE 2 CHRONIC KIDNEY DISEASE, WITHOUT LONG-TERM CURRENT USE OF INSULIN: ICD-10-CM

## 2023-03-21 DIAGNOSIS — S42.254A NONDISPLACED FRACTURE OF GREATER TUBEROSITY OF RIGHT HUMERUS, INITIAL ENCOUNTER FOR CLOSED FRACTURE: Primary | ICD-10-CM

## 2023-03-21 DIAGNOSIS — N18.2 TYPE 2 DIABETES MELLITUS WITH STAGE 2 CHRONIC KIDNEY DISEASE, WITHOUT LONG-TERM CURRENT USE OF INSULIN: ICD-10-CM

## 2023-03-21 DIAGNOSIS — H40.1113 PRIMARY OPEN ANGLE GLAUCOMA (POAG) OF RIGHT EYE, SEVERE STAGE: ICD-10-CM

## 2023-03-21 NOTE — PROGRESS NOTES
Established Patient - Audio Only Telehealth Visit     The patient location is: [place]   The chief complaint leading to consultation is: follow up  Visit type: Virtual visit with audio only (telephone)  Total time spent with patient: 30 minutes        The reason for the audio only service rather than synchronous audio and video virtual visit was related to technical difficulties or patient preference/necessity.     Each patient to whom I provide medical services by telemedicine is:  (1) informed of the relationship between the physician and patient and the respective role of any other health care provider with respect to management of the patient; and (2) notified that they may decline to receive medical services by telemedicine and may withdraw from such care at any time. Patient verbally consented to receive this service via voice-only telephone call.       HPI:   Alyssa Anderson is a 61 y.o. yo female non-insulin diabetes mellitus type II, and CVA.  Patient was previously seen by another provider at this clinic.  Last hemoglobin A1c 5.1, currently on Metformin 500 mg daily.  She currently follows Neurology due to history of stroke, currently on Plavix and aspirin. No new symptoms. Patient has residual symptoms: expressive aphasia.  She reports that she had seizures in the past and was placed on Keppra by Neurology. She states that she stopped taking the medication and started having seizures. She started the medication again and reports she has not had a seizure in many years.  Patient states that she fell in the parking lot. She states that right shoulder is still hurting and reports numbness.   Patient denies chest pain, palpitations, and shortness of breath.   Patient denies fever, night sweats, chills, nausea, vomiting, diarrhea, constipation, weight loss, and changes in appetite.     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 She reports this injury affects her activities of daily living. Patient reports she can not bathe, sew, and can not carry heavy items. Sister does laundry for her at this time because she can not complete on her own.   Last hemoglobin A1C 5.5 continue Metformin 500 mg daily. Diabetic fundus photos no retinopathy, last appointment with Ophthalmology 10/2021. Monofilament examination was normal 4/2022, next screening 4/2023. Lifestyle modifications.  Avoid nephrotoxic drugs.  Continue medications Lisinopril 40 mg daily, Norvasc 10 mg daily, and Hydrochlorothiazide 12.5 mg daily. Patient reports systolic blood pressure 100-115.  Patient reports no new seizures. Continue Keppra. Patient is on 500 mg BID per last note. Continue to follow Neurology. Neurology has recommended to continue Plavix and Aspirin.  Completed prescription, recheck at a later date.  Controlled with Effexor, which she was started on by another provider.  Continue to follow up with Ophthalmology and continue eyedrops.      Return to clinic in 6 weeks with labs.     Yvonne Adkins MD  [facility]           This service was not originating from a related E/M service provided within the previous 7 days nor will  to an E/M service or procedure within the next 24 hours or my soonest available appointment.  Prevailing standard of care was able to be met in this audio-only visit.

## 2023-03-29 ENCOUNTER — OFFICE VISIT (OUTPATIENT)
Dept: ORTHOPEDICS | Facility: CLINIC | Age: 63
End: 2023-03-29
Payer: MEDICARE

## 2023-03-29 ENCOUNTER — HOSPITAL ENCOUNTER (OUTPATIENT)
Dept: RADIOLOGY | Facility: CLINIC | Age: 63
Discharge: HOME | End: 2023-03-29
Attending: ORTHOPAEDIC SURGERY
Payer: MEDICARE

## 2023-03-29 VITALS
BODY MASS INDEX: 23.54 KG/M2 | HEIGHT: 67 IN | SYSTOLIC BLOOD PRESSURE: 112 MMHG | TEMPERATURE: 98 F | HEART RATE: 60 BPM | OXYGEN SATURATION: 98 % | DIASTOLIC BLOOD PRESSURE: 80 MMHG | WEIGHT: 150 LBS

## 2023-03-29 DIAGNOSIS — S42.294D OTHER CLOSED NONDISPLACED FRACTURE OF PROXIMAL END OF RIGHT HUMERUS WITH ROUTINE HEALING, SUBSEQUENT ENCOUNTER: ICD-10-CM

## 2023-03-29 DIAGNOSIS — S42.294D OTHER CLOSED NONDISPLACED FRACTURE OF PROXIMAL END OF RIGHT HUMERUS WITH ROUTINE HEALING, SUBSEQUENT ENCOUNTER: Primary | ICD-10-CM

## 2023-03-29 PROCEDURE — 3074F SYST BP LT 130 MM HG: CPT | Mod: CPTII,,, | Performed by: NURSE PRACTITIONER

## 2023-03-29 PROCEDURE — 3008F PR BODY MASS INDEX (BMI) DOCUMENTED: ICD-10-PCS | Mod: CPTII,,, | Performed by: NURSE PRACTITIONER

## 2023-03-29 PROCEDURE — 99024 POSTOP FOLLOW-UP VISIT: CPT | Mod: ,,, | Performed by: NURSE PRACTITIONER

## 2023-03-29 PROCEDURE — 4010F PR ACE/ARB THEARPY RXD/TAKEN: ICD-10-PCS | Mod: CPTII,,, | Performed by: NURSE PRACTITIONER

## 2023-03-29 PROCEDURE — 3072F LOW RISK FOR RETINOPATHY: CPT | Mod: CPTII,,, | Performed by: NURSE PRACTITIONER

## 2023-03-29 PROCEDURE — 3079F DIAST BP 80-89 MM HG: CPT | Mod: CPTII,,, | Performed by: NURSE PRACTITIONER

## 2023-03-29 PROCEDURE — 3008F BODY MASS INDEX DOCD: CPT | Mod: CPTII,,, | Performed by: NURSE PRACTITIONER

## 2023-03-29 PROCEDURE — 73030 XR SHOULDER COMPLETE 2 OR MORE VIEWS RIGHT: ICD-10-PCS | Mod: RT,,, | Performed by: ORTHOPAEDIC SURGERY

## 2023-03-29 PROCEDURE — 1159F MED LIST DOCD IN RCRD: CPT | Mod: CPTII,,, | Performed by: NURSE PRACTITIONER

## 2023-03-29 PROCEDURE — 73030 X-RAY EXAM OF SHOULDER: CPT | Mod: RT,,, | Performed by: ORTHOPAEDIC SURGERY

## 2023-03-29 PROCEDURE — 3079F PR MOST RECENT DIASTOLIC BLOOD PRESSURE 80-89 MM HG: ICD-10-PCS | Mod: CPTII,,, | Performed by: NURSE PRACTITIONER

## 2023-03-29 PROCEDURE — 3074F PR MOST RECENT SYSTOLIC BLOOD PRESSURE < 130 MM HG: ICD-10-PCS | Mod: CPTII,,, | Performed by: NURSE PRACTITIONER

## 2023-03-29 PROCEDURE — 99024 PR POST-OP FOLLOW-UP VISIT: ICD-10-PCS | Mod: ,,, | Performed by: NURSE PRACTITIONER

## 2023-03-29 PROCEDURE — 3072F PR LOW RISK FOR RETINOPATHY: ICD-10-PCS | Mod: CPTII,,, | Performed by: NURSE PRACTITIONER

## 2023-03-29 PROCEDURE — 4010F ACE/ARB THERAPY RXD/TAKEN: CPT | Mod: CPTII,,, | Performed by: NURSE PRACTITIONER

## 2023-03-29 PROCEDURE — 1159F PR MEDICATION LIST DOCUMENTED IN MEDICAL RECORD: ICD-10-PCS | Mod: CPTII,,, | Performed by: NURSE PRACTITIONER

## 2023-03-29 NOTE — PROGRESS NOTES
Chief Complaint:   Chief Complaint   Patient presents with    Right Shoulder - Pain, Post-op Evaluation     Follow up visit for right proximal humerus fracture. Attending PT 2 times weekly. Pain is a 8/10 on worse day. Not taking anything for pain. DOI 1/25/2023         History of present illness:  1/25/23: Right Proximal Humerus Fracture, nonoperative treatment    She returns today. Her pain is under good control. She is working with HH therapy. She does report some numbness around the mid arm where the sling and swathe was. Denies other numbness or tingling. Denies neck issues.     Musculoskeletal:   Right shoulder without obvious deformity. Non tender to palpation. , abduction 80, external rotation 80    Imaging: X-rays ordered and images interpreted today personally by me of 3 views of her right shoulder show interval fracture healing       Assessment: Other closed nondisplaced fracture of proximal end of right humerus with routine healing, subsequent encounter  -     X-ray Shoulder 2 or More Views Right; Future; Expected date: 03/29/2023        Plan:  Doing well s/p above. Continue HH therapy. Follow up in 6 weeks with xrays of the right shoulder.

## 2023-03-31 ENCOUNTER — EXTERNAL HOME HEALTH (OUTPATIENT)
Dept: HOME HEALTH SERVICES | Facility: HOSPITAL | Age: 63
End: 2023-03-31
Payer: MEDICARE

## 2023-04-25 ENCOUNTER — LAB VISIT (OUTPATIENT)
Dept: LAB | Facility: HOSPITAL | Age: 63
End: 2023-04-25
Attending: INTERNAL MEDICINE
Payer: MEDICARE

## 2023-04-25 DIAGNOSIS — E11.22 TYPE 2 DIABETES MELLITUS WITH STAGE 2 CHRONIC KIDNEY DISEASE, WITHOUT LONG-TERM CURRENT USE OF INSULIN: ICD-10-CM

## 2023-04-25 DIAGNOSIS — N18.2 TYPE 2 DIABETES MELLITUS WITH STAGE 2 CHRONIC KIDNEY DISEASE, WITHOUT LONG-TERM CURRENT USE OF INSULIN: ICD-10-CM

## 2023-04-25 DIAGNOSIS — N18.2 TYPE 2 DIABETES MELLITUS WITH STAGE 2 CHRONIC KIDNEY DISEASE, WITHOUT LONG-TERM CURRENT USE OF INSULIN: Primary | ICD-10-CM

## 2023-04-25 DIAGNOSIS — N18.2 CHRONIC KIDNEY DISEASE, STAGE II (MILD): ICD-10-CM

## 2023-04-25 DIAGNOSIS — E11.22 TYPE 2 DIABETES MELLITUS WITH STAGE 2 CHRONIC KIDNEY DISEASE, WITHOUT LONG-TERM CURRENT USE OF INSULIN: Primary | ICD-10-CM

## 2023-04-25 DIAGNOSIS — E55.9 VITAMIN D DEFICIENCY, UNSPECIFIED: ICD-10-CM

## 2023-04-25 LAB
ALBUMIN SERPL-MCNC: 3.9 G/DL (ref 3.4–4.8)
ALBUMIN/GLOB SERPL: 1 RATIO (ref 1.1–2)
ALP SERPL-CCNC: 89 UNIT/L (ref 40–150)
ALT SERPL-CCNC: 18 UNIT/L (ref 0–55)
AST SERPL-CCNC: 16 UNIT/L (ref 5–34)
BILIRUBIN DIRECT+TOT PNL SERPL-MCNC: 0.5 MG/DL
BUN SERPL-MCNC: 12.8 MG/DL (ref 9.8–20.1)
CALCIUM SERPL-MCNC: 10 MG/DL (ref 8.4–10.2)
CHLORIDE SERPL-SCNC: 105 MMOL/L (ref 98–107)
CO2 SERPL-SCNC: 27 MMOL/L (ref 23–31)
CREAT SERPL-MCNC: 1.15 MG/DL (ref 0.55–1.02)
DEPRECATED CALCIDIOL+CALCIFEROL SERPL-MC: 13.1 NG/ML (ref 30–80)
EST. AVERAGE GLUCOSE BLD GHB EST-MCNC: 108.3 MG/DL
GFR SERPLBLD CREATININE-BSD FMLA CKD-EPI: 54 MLS/MIN/1.73/M2
GLOBULIN SER-MCNC: 3.9 GM/DL (ref 2.4–3.5)
GLUCOSE SERPL-MCNC: 115 MG/DL (ref 82–115)
HBA1C MFR BLD: 5.4 %
POTASSIUM SERPL-SCNC: 3.5 MMOL/L (ref 3.5–5.1)
PROT SERPL-MCNC: 7.8 GM/DL (ref 5.8–7.6)
SODIUM SERPL-SCNC: 141 MMOL/L (ref 136–145)

## 2023-04-25 PROCEDURE — 83036 HEMOGLOBIN GLYCOSYLATED A1C: CPT

## 2023-04-25 PROCEDURE — 82306 VITAMIN D 25 HYDROXY: CPT

## 2023-04-25 PROCEDURE — 80053 COMPREHEN METABOLIC PANEL: CPT

## 2023-04-25 PROCEDURE — 36415 COLL VENOUS BLD VENIPUNCTURE: CPT

## 2023-04-26 ENCOUNTER — OFFICE VISIT (OUTPATIENT)
Dept: OPHTHALMOLOGY | Facility: CLINIC | Age: 63
End: 2023-04-26
Payer: MEDICARE

## 2023-04-26 VITALS — WEIGHT: 149.94 LBS | HEIGHT: 67 IN | BODY MASS INDEX: 23.53 KG/M2

## 2023-04-26 DIAGNOSIS — H40.1122 PRIMARY OPEN ANGLE GLAUCOMA (POAG) OF LEFT EYE, MODERATE STAGE: ICD-10-CM

## 2023-04-26 DIAGNOSIS — H40.1113 PRIMARY OPEN ANGLE GLAUCOMA (POAG) OF RIGHT EYE, SEVERE STAGE: Primary | ICD-10-CM

## 2023-04-26 PROCEDURE — 99213 OFFICE O/P EST LOW 20 MIN: CPT | Mod: PBBFAC,PO | Performed by: STUDENT IN AN ORGANIZED HEALTH CARE EDUCATION/TRAINING PROGRAM

## 2023-04-26 NOTE — PROGRESS NOTES
HPI     Primary open angle glaucoma      Additional comments: IOP check           Comments    Primary open angle glaucoma           Last edited by Anselmo Pina MA on 4/26/2023  1:39 PM.            HPI     Primary open angle glaucoma      Additional comments: IOP check           Comments    Primary open angle glaucoma           Last edited by Anselmo Pina MA on 4/26/2023  1:39 PM.      Assessment /Plan     For exam results, see Encounter Report.    There are no diagnoses linked to this encounter.      OCT RNFL   08/03/22: 38//72; red STI, yel N // red I, yel N  08/25/22: 50//76; all red // yel I    OCT Mac 8/3/22  - OD: 207, generalized retinal atrophy  - OS: 249, good foveal contour     HVF 8/3/22  - OD: 11/12 FL 38% FP 21% FN, unreliable dense SAD  - OS: 4/11 2% FP 0% FN, unreliable, nonspecific deficits     HVF 12/27/22  - OD: 7/13 FL 8% FP 8% FN, dense superior defect mostly respecting horizontal; inf NS  - OS: 3/11 3% FP 5% FN, scattered inf and nasal misses - not matching prior misses     A/P:    1. POAG, Right Eye, Severe  2. POAG, Left Eye, Moderate  - + FH, thin pachy  - gonio 2/3/22 open 360 OU  - target pressure 13 // 13, unknown tmax  - OCT RNFL 08/21 45 all red // 76 yellow I, rest green. Increased thinning OS compared to 8/2021  - HVF stable w superior hemifield defect OD and scattered non-specific defects OS - **Hx of stroke in 2014 affecting right eye vision   - Continue xalantan QHS, cosopt BID, brimonidine TID  - Monocular precautions given, polycarbs dispensed  - OCT last visit with poor signal strength (8/3/22). Repeated OCT 12/22 and it is stable from 2021 in the left eye.   - At this time, will not proceed with SLT or xen gel OS. OD is comfort care. Patient denies any pain  12/27/22: new VF with stable findings OD (dens sup alt + early inf NS), os with nonspecific misses not consistent with prior. Continue drops as above. IOP doing well  - 4/26/23: IOP 14 OU, sl above goal 13 OU. Cont  drops, if IOP elevated at next, may need to rediscuss SLT OS     3. NSC OU  - NVS, CTM     4. DM II w/o Retinopathy  Hemoglobin A1c   Date Value Ref Range Status   04/25/2023 5.4 <=7.0 % Final   11/01/2022 5.1 <=7.0 % Final   09/08/2020 5.4 4.2 - 6.3 % Final   - encouraged good control/regular PCP f/u  - yearly DFE due 08/2023     5. PVD OU  - RD precautions  - monitor     6. Monocular status  - patient has polycarb glasses     RTC 4 months for Mrx, iop check, DFE, OCT nerve

## 2023-05-02 ENCOUNTER — OFFICE VISIT (OUTPATIENT)
Dept: INTERNAL MEDICINE | Facility: CLINIC | Age: 63
End: 2023-05-02
Payer: MEDICARE

## 2023-05-02 VITALS
HEART RATE: 63 BPM | SYSTOLIC BLOOD PRESSURE: 127 MMHG | WEIGHT: 167.75 LBS | HEIGHT: 67 IN | DIASTOLIC BLOOD PRESSURE: 87 MMHG | BODY MASS INDEX: 26.33 KG/M2 | TEMPERATURE: 99 F

## 2023-05-02 DIAGNOSIS — G40.909 SEIZURE DISORDER: ICD-10-CM

## 2023-05-02 DIAGNOSIS — E78.5 HYPERLIPIDEMIA, UNSPECIFIED HYPERLIPIDEMIA TYPE: ICD-10-CM

## 2023-05-02 DIAGNOSIS — I10 HYPERTENSION, UNSPECIFIED TYPE: Primary | ICD-10-CM

## 2023-05-02 DIAGNOSIS — H40.1113 PRIMARY OPEN ANGLE GLAUCOMA (POAG) OF RIGHT EYE, SEVERE STAGE: ICD-10-CM

## 2023-05-02 DIAGNOSIS — H25.13 AGE-RELATED NUCLEAR CATARACT OF BOTH EYES: ICD-10-CM

## 2023-05-02 PROCEDURE — 99213 OFFICE O/P EST LOW 20 MIN: CPT | Mod: PBBFAC | Performed by: INTERNAL MEDICINE

## 2023-05-02 RX ORDER — ERGOCALCIFEROL 1.25 MG/1
50000 CAPSULE ORAL
Qty: 12 CAPSULE | Refills: 0 | Status: SHIPPED | OUTPATIENT
Start: 2023-05-02 | End: 2023-07-17 | Stop reason: SDUPTHER

## 2023-05-02 NOTE — PROGRESS NOTES
[facility] INTERNAL MEDICINE  OUTPATIENT OFFICE VISIT NOTE    SUBJECTIVE:    HPI: Alyssa Anderson is a 61 y.o. yo female non-insulin diabetes mellitus type II, and CVA.  Patient was previously seen by another provider at this clinic.  Last hemoglobin A1c 5.1, currently on Metformin 500 mg daily.  She currently follows Neurology due to history of stroke, currently on Plavix and aspirin. No new symptoms. Patient has residual symptoms: expressive aphasia.  She reports that she had seizures in the past and was placed on Keppra by Neurology. She states that she stopped taking the medication and started having seizures. She started the medication again and reports she has not had a seizure in many years.  Patient states that she fell in the parking lot. She states that right shoulder is still hurting and reports numbness.   Patient denies chest pain, palpitations, and shortness of breath.   Patient denies fever, night sweats, chills, nausea, vomiting, diarrhea, constipation, weight loss, and changes in appetite.    Review of Systems:  Constitutional: No fever, No chills, No sweats, No weakness, No fatigue, No decreased activity.   Eye: No recent visual problem, No icterus, No double vision.  Ear/Nose/Mouth/Throat: No nasal congestion, No sore throat.   Respiratory: No shortness of breath, No cough, No sputum production, No hemoptysis,   No wheezing, No cyanosis.   Cardiovascular: No chest pain, No palpitations, No peripheral edema, No syncope.   Gastrointestinal: No nausea, No vomiting, No diarrhea, No constipation, No hematemesis.   Genitourinary: No dysuria, No hematuria, No change in urine stream, No urethral discharge.   Hematology/Lymphatics: No bruising tendency, No bleeding tendency.   Endocrine: No polyuria, No cold intolerance, No heat intolerance.   Immunologic: Not immunocompromised, No recurrent fevers.   Musculoskeletal: No joint pain, No claudication.   Integumentary: No rash, No pruritus, No abrasions, No  "petechiae.    Neurologic: Alert and oriented X4, No abnormal balance, No numbness, No tingling.   Psychiatric: No anxiety, no depression, Not suicidal, Not delusional, No hallucinations.     OBJECTIVE:    Vitals:   /87   Pulse 63   Temp 98.8 °F (37.1 °C)   Ht 5' 6.93" (1.7 m)   Wt 76.1 kg (167 lb 12.3 oz)   BMI 26.33 kg/m²      Physical Exam:  General: Alert and oriented, No acute distress.   Eye: Pupils are equal, round and reactive to light, Extraocular movements are intact,   Normal conjunctiva, Vision unchanged.   HENT: Normocephalic, Normal hearing, Oral mucosa is moist.   Neck: Supple, No carotid bruit, No jugular venous distention, No thyromegaly.   Respiratory: Lungs are clear to auscultation, Respirations are non-labored, Breath sounds   are equal, Symmetrical chest wall expansion, No chest wall tenderness.   Cardiovascular: Normal rate, Regular rhythm, No murmur, No gallop, Good pulses equal in   All extremities, Normal peripheral perfusion, No edema.   Gastrointestinal: Soft, Non-tender, Non-distended, Normal bowel sounds.   Genitourinary: No costovertebral angle tenderness, No suprapubic tenderness.   Musculoskeletal: Normal range of motion, Normal strength, No swelling, No deformity, Normal gait.   Integumentary: Warm, No pallor, No rash.   Neurologic: Alert, Oriented, Normal sensory, Normal motor function, No focal deficits,   Cranial Nerves II-XII are grossly intact, Monofilament examination: No sensory loss on plantar   or dorsal surface of feet bilaterally.   Psychiatric: Cooperative, Appropriate mood & affect.     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 She reports this injury affects her " activities of daily living. Patient reports she can not bathe, sew, and can not carry heavy items. Sister does laundry for her at this time because she can not complete on her own.   Last hemoglobin A1C 5.5 continue Metformin 500 mg daily. Diabetic fundus photos no retinopathy, last appointment with Ophthalmology 10/2021. Monofilament examination was normal 4/2022, next screening 4/2023. Lifestyle modifications.  US limited retroperitoneum- unremarkable renal ultrasound. Avoid nephrotoxic drugs.  Blood pressure 127/87. Continue medications Lisinopril 40 mg daily, Norvasc 10 mg daily, and Hydrochlorothiazide 12.5 mg daily. Patient reports systolic blood pressure 100-115.  Patient reports no new seizures. Continue Keppra. Patient is on 500 mg BID per last note. Continue to follow Neurology. Neurology has recommended to continue Plavix and Aspirin.  Vitamin D 13.1. Previously 21.4. Completed prescription, re prescribe, this time for 12 weeks.   Controlled with Effexor, which she was started on by another provider.  Continue to follow up with Ophthalmology and continue eyedrops.    Return to clinic in 7 weeks. Discuss weight loss options at next visit. Patient states that she has been exercising and eating healthy and still not losing weight.   At next visit, will discuss referring to Nephrology.     Yvonne Adkins MD  [facility]

## 2023-05-10 ENCOUNTER — HOSPITAL ENCOUNTER (OUTPATIENT)
Dept: RADIOLOGY | Facility: CLINIC | Age: 63
Discharge: HOME | End: 2023-05-10
Attending: ORTHOPAEDIC SURGERY
Payer: MEDICARE

## 2023-05-10 ENCOUNTER — OFFICE VISIT (OUTPATIENT)
Dept: ORTHOPEDICS | Facility: CLINIC | Age: 63
End: 2023-05-10
Payer: MEDICARE

## 2023-05-10 VITALS — BODY MASS INDEX: 26.21 KG/M2 | WEIGHT: 167 LBS | HEIGHT: 67 IN

## 2023-05-10 DIAGNOSIS — S42.294D OTHER CLOSED NONDISPLACED FRACTURE OF PROXIMAL END OF RIGHT HUMERUS WITH ROUTINE HEALING, SUBSEQUENT ENCOUNTER: ICD-10-CM

## 2023-05-10 DIAGNOSIS — S42.294D OTHER CLOSED NONDISPLACED FRACTURE OF PROXIMAL END OF RIGHT HUMERUS WITH ROUTINE HEALING, SUBSEQUENT ENCOUNTER: Primary | ICD-10-CM

## 2023-05-10 PROCEDURE — 4010F ACE/ARB THERAPY RXD/TAKEN: CPT | Mod: CPTII,,, | Performed by: ORTHOPAEDIC SURGERY

## 2023-05-10 PROCEDURE — 99024 PR POST-OP FOLLOW-UP VISIT: ICD-10-PCS | Mod: ,,, | Performed by: ORTHOPAEDIC SURGERY

## 2023-05-10 PROCEDURE — 3072F LOW RISK FOR RETINOPATHY: CPT | Mod: CPTII,,, | Performed by: ORTHOPAEDIC SURGERY

## 2023-05-10 PROCEDURE — 73030 X-RAY EXAM OF SHOULDER: CPT | Mod: RT,,, | Performed by: ORTHOPAEDIC SURGERY

## 2023-05-10 PROCEDURE — 1159F MED LIST DOCD IN RCRD: CPT | Mod: CPTII,,, | Performed by: ORTHOPAEDIC SURGERY

## 2023-05-10 PROCEDURE — 3066F NEPHROPATHY DOC TX: CPT | Mod: CPTII,,, | Performed by: ORTHOPAEDIC SURGERY

## 2023-05-10 PROCEDURE — 3066F PR DOCUMENTATION OF TREATMENT FOR NEPHROPATHY: ICD-10-PCS | Mod: CPTII,,, | Performed by: ORTHOPAEDIC SURGERY

## 2023-05-10 PROCEDURE — 73030 XR SHOULDER COMPLETE 2 OR MORE VIEWS RIGHT: ICD-10-PCS | Mod: RT,,, | Performed by: ORTHOPAEDIC SURGERY

## 2023-05-10 PROCEDURE — 1159F PR MEDICATION LIST DOCUMENTED IN MEDICAL RECORD: ICD-10-PCS | Mod: CPTII,,, | Performed by: ORTHOPAEDIC SURGERY

## 2023-05-10 PROCEDURE — 3072F PR LOW RISK FOR RETINOPATHY: ICD-10-PCS | Mod: CPTII,,, | Performed by: ORTHOPAEDIC SURGERY

## 2023-05-10 PROCEDURE — 4010F PR ACE/ARB THEARPY RXD/TAKEN: ICD-10-PCS | Mod: CPTII,,, | Performed by: ORTHOPAEDIC SURGERY

## 2023-05-10 PROCEDURE — 3061F NEG MICROALBUMINURIA REV: CPT | Mod: CPTII,,, | Performed by: ORTHOPAEDIC SURGERY

## 2023-05-10 PROCEDURE — 99024 POSTOP FOLLOW-UP VISIT: CPT | Mod: ,,, | Performed by: ORTHOPAEDIC SURGERY

## 2023-05-10 PROCEDURE — 3061F PR NEG MICROALBUMINURIA RESULT DOCUMENTED/REVIEW: ICD-10-PCS | Mod: CPTII,,, | Performed by: ORTHOPAEDIC SURGERY

## 2023-05-10 PROCEDURE — 3008F BODY MASS INDEX DOCD: CPT | Mod: CPTII,,, | Performed by: ORTHOPAEDIC SURGERY

## 2023-05-10 PROCEDURE — 3008F PR BODY MASS INDEX (BMI) DOCUMENTED: ICD-10-PCS | Mod: CPTII,,, | Performed by: ORTHOPAEDIC SURGERY

## 2023-05-10 RX ORDER — DICLOFENAC SODIUM 10 MG/G
2 GEL TOPICAL 4 TIMES DAILY
Qty: 150 G | Refills: 0 | Status: SHIPPED | OUTPATIENT
Start: 2023-05-10

## 2023-05-10 NOTE — PROGRESS NOTES
Chief Complaint:   Chief Complaint   Patient presents with    Right Shoulder - Pain    Shoulder Pain     3 month f/u for right proximal humerus fx DOI 1/25/23, patient states the pain is better but still has some limitations on ROM and has trouble hooking her bra       Consulting Physician: No ref. provider found    History of present illness:  1/25/23: Right Proximal Humerus Fracture, nonoperative treatment    She returns today.  Her pain is improved.  She is finished up her formal physical therapy.  She does have some issues with internal rotation.  Overall her pain is improved.    Past Medical History:   Diagnosis Date    Cataract     Diabetes mellitus     Glaucoma     Stroke 2014       Past Surgical History:   Procedure Laterality Date    COLONOSCOPY N/A 10/13/2022    Procedure: COLONOSCOPY;  Surgeon: Yusuf Iqbal MD;  Location: [facility] ENDOSCOPY;  Service: Endoscopy;  Laterality: N/A;  Hold Plavix 5 days- confirmed with pt-lw    HYSTERECTOMY  01/01/1984       Current Outpatient Medications   Medication Sig    amLODIPine (NORVASC) 10 MG tablet Take 1 tablet (10 mg total) by mouth once daily.    aspirin (ECOTRIN) 81 MG EC tablet Take 81 mg by mouth once daily.    atorvastatin (LIPITOR) 20 MG tablet Take 1 tablet (20 mg total) by mouth once daily.    brimonidine 0.15 % OPTH DROP (ALPHAGAN) 0.15 % ophthalmic solution Place 1 drop into both eyes 3 (three) times daily.    brimonidine 0.2% (ALPHAGAN) 0.2 % Drop Place 1 drop into both eyes 3 (three) times daily.    clopidogreL (PLAVIX) 75 mg tablet Take 1 tablet (75 mg total) by mouth once daily.    dorzolamide-timolol 2-0.5% (COSOPT) 22.3-6.8 mg/mL ophthalmic solution Place 1 drop into both eyes 2 (two) times daily.    ergocalciferol (ERGOCALCIFEROL) 50,000 unit Cap Take 1 capsule (50,000 Units total) by mouth every 7 days.    hydroCHLOROthiazide (HYDRODIURIL) 12.5 MG Tab Take 1 tablet (12.5 mg total) by mouth once daily.    latanoprost 0.005 % ophthalmic solution  Place 1 drop into both eyes every evening.    levETIRAcetam (KEPPRA) 500 MG Tab Take 1 tablet (500 mg total) by mouth 2 (two) times daily.    lisinopriL (PRINIVIL,ZESTRIL) 40 MG tablet Take 1 tablet (40 mg total) by mouth once daily.    metFORMIN (GLUCOPHAGE) 500 MG tablet Take 1 tablet (500 mg total) by mouth daily with breakfast.    MOVIPREP 100-7.5-2.691 gram solution Take by mouth.    venlafaxine (EFFEXOR-XR) 75 MG 24 hr capsule Take 1 capsule (75 mg total) by mouth once daily.    diclofenac sodium (VOLTAREN) 1 % Gel Apply 2 g topically 4 (four) times daily.    HYDROcodone-acetaminophen (NORCO) 5-325 mg per tablet Take 1 tablet by mouth every 6 (six) hours as needed for Pain. (Patient not taking: Reported on 5/2/2023)     No current facility-administered medications for this visit.       Review of patient's allergies indicates:  No Known Allergies    Family History   Problem Relation Age of Onset    Diabetes Mother     Glaucoma Mother     Vision loss Maternal Grandmother     Hypertension Sister     No Known Problems Father     No Known Problems Brother     No Known Problems Maternal Aunt     No Known Problems Maternal Uncle     No Known Problems Paternal Aunt     No Known Problems Paternal Uncle     No Known Problems Maternal Grandfather     No Known Problems Paternal Grandmother     No Known Problems Paternal Grandfather     Amblyopia Neg Hx     Blindness Neg Hx     Cancer Neg Hx     Cataracts Neg Hx     Macular degeneration Neg Hx     Retinal detachment Neg Hx     Strabismus Neg Hx     Stroke Neg Hx     Thyroid disease Neg Hx        Social History     Socioeconomic History    Marital status: Single   Tobacco Use    Smoking status: Never    Smokeless tobacco: Never   Substance and Sexual Activity    Alcohol use: Never    Drug use: Never    Sexual activity: Not Currently     Social Determinants of Health     Financial Resource Strain: Low Risk     Difficulty of Paying Living Expenses: Not very hard   Food  "Insecurity: No Food Insecurity    Worried About Running Out of Food in the Last Year: Never true    Ran Out of Food in the Last Year: Never true   Transportation Needs: No Transportation Needs    Lack of Transportation (Medical): No    Lack of Transportation (Non-Medical): No   Physical Activity: Sufficiently Active    Days of Exercise per Week: 6 days    Minutes of Exercise per Session: 60 min   Stress: No Stress Concern Present    Feeling of Stress : Not at all   Social Connections: Moderately Integrated    Frequency of Communication with Friends and Family: Twice a week    Frequency of Social Gatherings with Friends and Family: Once a week    Attends Oriental orthodox Services: More than 4 times per year    Active Member of Clubs or Organizations: No    Attends Club or Organization Meetings: Never    Marital Status:    Housing Stability: Low Risk     Unable to Pay for Housing in the Last Year: No    Number of Places Lived in the Last Year: 1    Unstable Housing in the Last Year: No       Review of Systems:    Constitution:   Denies chills, fever, and sweats.  HENT:   Denies headaches or blurry vision.  Cardiovascular:  Denies chest pain or irregular heart beat.  Respiratory:   Denies cough or shortness of breath.  Gastrointestinal:  Denies abdominal pain, nausea, or vomiting.  Musculoskeletal:   Denies muscle cramps.  Neurological:   Denies dizziness or focal weakness.  Psychiatric/Behavior: Normal mental status.  Hematology/Lymph:  Denies bleeding problem or easy bruising/bleeding.  Skin:    Denies rash or suspicious lesions.    Examination:    Vital Signs:    Vitals:    05/10/23 1012   Weight: 75.8 kg (167 lb)   Height: 5' 7" (1.702 m)       Body mass index is 26.16 kg/m².    Constitution:   Well-developed, well nourished patient in no acute distress.  Neurological:   Alert and oriented x 3 and cooperative to examination.     Psychiatric/Behavior: Normal mental status.  Respiratory:   No shortness of " breath.  Cards:   Pulses palpable, brisk cap refill  Eyes:    Extraoccular muscles intact  Skin:    No scars, rash or suspicious lesions.    MSK:   Shoulder Exam:                   Right        Left  Skin:                                   Normal     Normal  AC joint tenderness:           None         None  Forward Flexion:                160            180  Abduction:                          100           180  External Rotation:               80              80  Internal Rotation:                20             80  Supraspinatus stress test: Neg           Neg  [place]'s Impingement:     Neg           Neg  Neer Impingement:            Neg           Neg  Apprehension:                   Neg           Neg  [place]'s:                           Neg           Neg  Speed's test:                     Neg            Neg  Strength:  External Rotation:           5/5                5/5  Lift Off/belly press:          5/5                5/5    N-V status:                   Intact             Intact    C-spine: Normal ROM, NT      Imaging: X-rays ordered and images interpreted today personally by me of four views of the right shoulder show healed fracture.         Assessment: Other closed nondisplaced fracture of proximal end of right humerus with routine healing, subsequent encounter  -     X-ray Shoulder 2 or More Views Right; Future; Expected date: 05/10/2023    Other orders  -     diclofenac sodium (VOLTAREN) 1 % Gel; Apply 2 g topically 4 (four) times daily.  Dispense: 150 g; Refill: 0        Plan:  We discussed maybe continuing outpatient therapy but she would like to try a home exercise program instead.  Will start some Voltaren gel for inflammation.  Activities as tolerated.  Fractures healed.  I will see her back as needed

## 2023-06-16 DIAGNOSIS — N18.2 TYPE 2 DIABETES MELLITUS WITH STAGE 2 CHRONIC KIDNEY DISEASE, WITHOUT LONG-TERM CURRENT USE OF INSULIN: ICD-10-CM

## 2023-06-16 DIAGNOSIS — E11.22 TYPE 2 DIABETES MELLITUS WITH STAGE 2 CHRONIC KIDNEY DISEASE, WITHOUT LONG-TERM CURRENT USE OF INSULIN: ICD-10-CM

## 2023-06-19 RX ORDER — METFORMIN HYDROCHLORIDE 500 MG/1
500 TABLET ORAL
Qty: 90 TABLET | Refills: 1 | Status: SHIPPED | OUTPATIENT
Start: 2023-06-19 | End: 2023-06-20 | Stop reason: SDUPTHER

## 2023-06-19 RX ORDER — AMLODIPINE BESYLATE 10 MG/1
10 TABLET ORAL DAILY
Qty: 90 TABLET | Refills: 1 | Status: SHIPPED | OUTPATIENT
Start: 2023-06-19 | End: 2023-06-20 | Stop reason: SDUPTHER

## 2023-06-19 RX ORDER — HYDROCHLOROTHIAZIDE 12.5 MG/1
12.5 TABLET ORAL DAILY
Qty: 90 TABLET | Refills: 1 | Status: SHIPPED | OUTPATIENT
Start: 2023-06-19 | End: 2023-06-20 | Stop reason: SDUPTHER

## 2023-06-19 RX ORDER — ATORVASTATIN CALCIUM 20 MG/1
20 TABLET, FILM COATED ORAL DAILY
Qty: 90 TABLET | Refills: 1 | Status: SHIPPED | OUTPATIENT
Start: 2023-06-19 | End: 2023-06-20 | Stop reason: SDUPTHER

## 2023-06-20 ENCOUNTER — OFFICE VISIT (OUTPATIENT)
Dept: INTERNAL MEDICINE | Facility: CLINIC | Age: 63
End: 2023-06-20
Payer: MEDICARE

## 2023-06-20 VITALS
BODY MASS INDEX: 27.39 KG/M2 | SYSTOLIC BLOOD PRESSURE: 121 MMHG | RESPIRATION RATE: 16 BRPM | WEIGHT: 174.5 LBS | OXYGEN SATURATION: 98 % | TEMPERATURE: 98 F | DIASTOLIC BLOOD PRESSURE: 87 MMHG | HEIGHT: 67 IN | HEART RATE: 63 BPM

## 2023-06-20 DIAGNOSIS — N18.30 STAGE 3 CHRONIC KIDNEY DISEASE, UNSPECIFIED WHETHER STAGE 3A OR 3B CKD: ICD-10-CM

## 2023-06-20 DIAGNOSIS — I10 HYPERTENSION, UNSPECIFIED TYPE: ICD-10-CM

## 2023-06-20 DIAGNOSIS — R56.9 SEIZURE: ICD-10-CM

## 2023-06-20 DIAGNOSIS — E11.22 TYPE 2 DIABETES MELLITUS WITH STAGE 2 CHRONIC KIDNEY DISEASE, WITHOUT LONG-TERM CURRENT USE OF INSULIN: ICD-10-CM

## 2023-06-20 DIAGNOSIS — N18.2 TYPE 2 DIABETES MELLITUS WITH STAGE 2 CHRONIC KIDNEY DISEASE, WITHOUT LONG-TERM CURRENT USE OF INSULIN: ICD-10-CM

## 2023-06-20 DIAGNOSIS — Z00.00 HEALTHCARE MAINTENANCE: ICD-10-CM

## 2023-06-20 DIAGNOSIS — Z12.31 BREAST CANCER SCREENING BY MAMMOGRAM: Primary | ICD-10-CM

## 2023-06-20 PROCEDURE — 99214 OFFICE O/P EST MOD 30 MIN: CPT | Mod: PBBFAC | Performed by: INTERNAL MEDICINE

## 2023-06-20 RX ORDER — VENLAFAXINE HYDROCHLORIDE 75 MG/1
75 CAPSULE, EXTENDED RELEASE ORAL DAILY
Qty: 30 CAPSULE | Refills: 4 | Status: SHIPPED | OUTPATIENT
Start: 2023-06-20 | End: 2023-10-12 | Stop reason: SDUPTHER

## 2023-06-20 RX ORDER — ATORVASTATIN CALCIUM 20 MG/1
20 TABLET, FILM COATED ORAL DAILY
Qty: 90 TABLET | Refills: 1 | Status: SHIPPED | OUTPATIENT
Start: 2023-06-20 | End: 2024-03-17 | Stop reason: SDUPTHER

## 2023-06-20 RX ORDER — LEVETIRACETAM 500 MG/1
500 TABLET ORAL 2 TIMES DAILY
Qty: 90 TABLET | Refills: 2 | Status: SHIPPED | OUTPATIENT
Start: 2023-06-20 | End: 2023-10-24 | Stop reason: SDUPTHER

## 2023-06-20 RX ORDER — CLOPIDOGREL BISULFATE 75 MG/1
75 TABLET ORAL DAILY
Qty: 90 TABLET | Refills: 1 | Status: SHIPPED | OUTPATIENT
Start: 2023-06-20 | End: 2024-02-01 | Stop reason: SDUPTHER

## 2023-06-20 RX ORDER — METFORMIN HYDROCHLORIDE 500 MG/1
500 TABLET ORAL
Qty: 90 TABLET | Refills: 1 | Status: SHIPPED | OUTPATIENT
Start: 2023-06-20 | End: 2024-03-26 | Stop reason: SDUPTHER

## 2023-06-20 RX ORDER — LISINOPRIL 40 MG/1
40 TABLET ORAL DAILY
Qty: 90 TABLET | Refills: 2 | Status: SHIPPED | OUTPATIENT
Start: 2023-06-20 | End: 2024-03-13 | Stop reason: SDUPTHER

## 2023-06-20 RX ORDER — AMLODIPINE BESYLATE 10 MG/1
10 TABLET ORAL DAILY
Qty: 90 TABLET | Refills: 1 | Status: SHIPPED | OUTPATIENT
Start: 2023-06-20 | End: 2024-03-26 | Stop reason: SDUPTHER

## 2023-06-20 RX ORDER — HYDROCHLOROTHIAZIDE 12.5 MG/1
12.5 TABLET ORAL DAILY
Qty: 90 TABLET | Refills: 1 | Status: SHIPPED | OUTPATIENT
Start: 2023-06-20 | End: 2024-03-26 | Stop reason: SDUPTHER

## 2023-06-20 NOTE — PROGRESS NOTES
[facility] INTERNAL MEDICINE  OUTPATIENT OFFICE VISIT NOTE    SUBJECTIVE:    HPI: Alyssa Anderson is a 61 y.o. yo female non-insulin diabetes mellitus type II, and CVA.  Patient was previously seen by another provider at this clinic.  Last hemoglobin A1c 5.1, currently on Metformin 500 mg daily.  She currently follows Neurology due to history of stroke, currently on Plavix and aspirin. No new symptoms. Patient has residual symptoms: expressive aphasia.  She reports that she had seizures in the past and was placed on Keppra by Neurology. She states that she stopped taking the medication and started having seizures. She started the medication again and reports she has not had a seizure in many years.  Patient states that she fell in the parking lot. She states that right shoulder is still hurting and reports numbness.   Patient denies chest pain, palpitations, and shortness of breath.   Patient denies fever, night sweats, chills, nausea, vomiting, diarrhea, constipation, weight loss, and changes in appetite.    Review of Systems:  Constitutional: No fever, No chills, No sweats, No weakness, No fatigue, No decreased activity.   Eye: No recent visual problem, No icterus, No double vision.  Ear/Nose/Mouth/Throat: No nasal congestion, No sore throat.   Respiratory: No shortness of breath, No cough, No sputum production, No hemoptysis,   No wheezing, No cyanosis.   Cardiovascular: No chest pain, No palpitations, No peripheral edema, No syncope.   Gastrointestinal: No nausea, No vomiting, No diarrhea, No constipation, No hematemesis.   Genitourinary: No dysuria, No hematuria, No change in urine stream, No urethral discharge.   Hematology/Lymphatics: No bruising tendency, No bleeding tendency.   Endocrine: No polyuria, No cold intolerance, No heat intolerance.   Immunologic: Not immunocompromised, No recurrent fevers.   Musculoskeletal: No joint pain, No claudication.   Integumentary: No rash, No pruritus, No abrasions, No  "petechiae.    Neurologic: Alert and oriented X4, No abnormal balance, No numbness, No tingling.   Psychiatric: No anxiety, no depression, Not suicidal, Not delusional, No hallucinations.     OBJECTIVE:    Vitals:   /87   Pulse 63   Temp 98.1 °F (36.7 °C)   Resp 16   Ht 5' 7" (1.702 m)   Wt 79.2 kg (174 lb 8 oz)   SpO2 98%   BMI 27.33 kg/m²      Physical Exam:  General: Alert and oriented, No acute distress.   Eye: Pupils are equal, round and reactive to light, Extraocular movements are intact,   Normal conjunctiva, Vision unchanged.   HENT: Normocephalic, Normal hearing, Oral mucosa is moist.   Neck: Supple, No carotid bruit, No jugular venous distention, No thyromegaly.   Respiratory: Lungs are clear to auscultation, Respirations are non-labored, Breath sounds   are equal, Symmetrical chest wall expansion, No chest wall tenderness.   Cardiovascular: Normal rate, Regular rhythm, No murmur, No gallop, Good pulses equal in   All extremities, Normal peripheral perfusion, No edema.   Gastrointestinal: Soft, Non-tender, Non-distended, Normal bowel sounds.   Genitourinary: No costovertebral angle tenderness, No suprapubic tenderness.   Musculoskeletal: Normal range of motion, Normal strength, No swelling, No deformity, Normal gait.   Integumentary: Warm, No pallor, No rash.   Neurologic: Alert, Oriented, Normal sensory, Normal motor function, No focal deficits,   Cranial Nerves II-XII are grossly intact, Monofilament examination: No sensory loss on plantar   or dorsal surface of feet bilaterally.   Psychiatric: Cooperative, Appropriate mood & affect.     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 She reports this " injury affects her activities of daily living. Patient reports she can not bathe, sew, and can not carry heavy items. Sister does laundry for her at this time because she can not complete on her own.   Last hemoglobin A1C 5.5 continue Metformin 500 mg daily. Diabetic fundus photos no retinopathy, last appointment with Ophthalmology. Monofilament examination was normal 6/2023, next screening 6/2024. Lifestyle modifications.  US limited retroperitoneum- unremarkable renal ultrasound. Avoid nephrotoxic drugs. Referral to Nephrology completed.   Blood pressure 127/87. Continue medications Lisinopril 40 mg daily, Norvasc 10 mg daily, and Hydrochlorothiazide 12.5 mg daily. Patient reports systolic blood pressure 100-115.  Patient reports no new seizures. Continue Keppra. Patient is on 500 mg BID per last note. Continue to follow Neurology. Neurology has recommended to continue Plavix and Aspirin.  Vitamin D 13.1. Previously 21.4. Currently on Vitamin D weekly for 12 weeks.   Controlled with Effexor, which she was started on by another provider.  Continue to follow up with Ophthalmology and continue eyedrops.    Discussed weight loss options, patient is overweight.  At next visit, will discuss referring to Nephrology.     Yvonne Adkins MD  [facility]

## 2023-07-03 ENCOUNTER — HOSPITAL ENCOUNTER (OUTPATIENT)
Dept: RADIOLOGY | Facility: HOSPITAL | Age: 63
Discharge: HOME | End: 2023-07-03
Attending: INTERNAL MEDICINE
Payer: MEDICARE

## 2023-07-03 DIAGNOSIS — Z12.31 BREAST CANCER SCREENING BY MAMMOGRAM: ICD-10-CM

## 2023-07-03 PROCEDURE — 77067 SCR MAMMO BI INCL CAD: CPT | Mod: TC

## 2023-07-03 PROCEDURE — 77067 MAMMO DIGITAL SCREENING BILAT WITH TOMO: ICD-10-PCS | Mod: 26,,, | Performed by: RADIOLOGY

## 2023-07-03 PROCEDURE — 77067 SCR MAMMO BI INCL CAD: CPT | Mod: 26,,, | Performed by: RADIOLOGY

## 2023-07-03 PROCEDURE — 77063 BREAST TOMOSYNTHESIS BI: CPT | Mod: 26,,, | Performed by: RADIOLOGY

## 2023-07-03 PROCEDURE — 77063 MAMMO DIGITAL SCREENING BILAT WITH TOMO: ICD-10-PCS | Mod: 26,,, | Performed by: RADIOLOGY

## 2023-07-12 ENCOUNTER — HOSPITAL ENCOUNTER (OUTPATIENT)
Dept: RADIOLOGY | Facility: CLINIC | Age: 63
Discharge: HOME | End: 2023-07-12
Attending: ORTHOPAEDIC SURGERY
Payer: MEDICARE

## 2023-07-12 ENCOUNTER — OFFICE VISIT (OUTPATIENT)
Dept: ORTHOPEDICS | Facility: CLINIC | Age: 63
End: 2023-07-12
Payer: MEDICARE

## 2023-07-12 VITALS — WEIGHT: 174 LBS | BODY MASS INDEX: 27.31 KG/M2 | HEIGHT: 67 IN

## 2023-07-12 DIAGNOSIS — S42.294D OTHER CLOSED NONDISPLACED FRACTURE OF PROXIMAL END OF RIGHT HUMERUS WITH ROUTINE HEALING, SUBSEQUENT ENCOUNTER: ICD-10-CM

## 2023-07-12 DIAGNOSIS — M25.511 CHRONIC RIGHT SHOULDER PAIN: ICD-10-CM

## 2023-07-12 DIAGNOSIS — S42.294D OTHER CLOSED NONDISPLACED FRACTURE OF PROXIMAL END OF RIGHT HUMERUS WITH ROUTINE HEALING, SUBSEQUENT ENCOUNTER: Primary | ICD-10-CM

## 2023-07-12 DIAGNOSIS — G89.29 CHRONIC RIGHT SHOULDER PAIN: ICD-10-CM

## 2023-07-12 PROCEDURE — 3044F HG A1C LEVEL LT 7.0%: CPT | Mod: CPTII,,, | Performed by: NURSE PRACTITIONER

## 2023-07-12 PROCEDURE — 3066F NEPHROPATHY DOC TX: CPT | Mod: CPTII,,, | Performed by: NURSE PRACTITIONER

## 2023-07-12 PROCEDURE — 3066F PR DOCUMENTATION OF TREATMENT FOR NEPHROPATHY: ICD-10-PCS | Mod: CPTII,,, | Performed by: NURSE PRACTITIONER

## 2023-07-12 PROCEDURE — 3072F LOW RISK FOR RETINOPATHY: CPT | Mod: CPTII,,, | Performed by: NURSE PRACTITIONER

## 2023-07-12 PROCEDURE — 99213 PR OFFICE/OUTPT VISIT, EST, LEVL III, 20-29 MIN: ICD-10-PCS | Mod: ,,, | Performed by: NURSE PRACTITIONER

## 2023-07-12 PROCEDURE — 3044F PR MOST RECENT HEMOGLOBIN A1C LEVEL <7.0%: ICD-10-PCS | Mod: CPTII,,, | Performed by: NURSE PRACTITIONER

## 2023-07-12 PROCEDURE — 1159F MED LIST DOCD IN RCRD: CPT | Mod: CPTII,,, | Performed by: NURSE PRACTITIONER

## 2023-07-12 PROCEDURE — 3061F NEG MICROALBUMINURIA REV: CPT | Mod: CPTII,,, | Performed by: NURSE PRACTITIONER

## 2023-07-12 PROCEDURE — 3061F PR NEG MICROALBUMINURIA RESULT DOCUMENTED/REVIEW: ICD-10-PCS | Mod: CPTII,,, | Performed by: NURSE PRACTITIONER

## 2023-07-12 PROCEDURE — 3008F PR BODY MASS INDEX (BMI) DOCUMENTED: ICD-10-PCS | Mod: CPTII,,, | Performed by: NURSE PRACTITIONER

## 2023-07-12 PROCEDURE — 4010F PR ACE/ARB THEARPY RXD/TAKEN: ICD-10-PCS | Mod: CPTII,,, | Performed by: NURSE PRACTITIONER

## 2023-07-12 PROCEDURE — 73030 X-RAY EXAM OF SHOULDER: CPT | Mod: RT,,, | Performed by: ORTHOPAEDIC SURGERY

## 2023-07-12 PROCEDURE — 4010F ACE/ARB THERAPY RXD/TAKEN: CPT | Mod: CPTII,,, | Performed by: NURSE PRACTITIONER

## 2023-07-12 PROCEDURE — 99213 OFFICE O/P EST LOW 20 MIN: CPT | Mod: ,,, | Performed by: NURSE PRACTITIONER

## 2023-07-12 PROCEDURE — 3072F PR LOW RISK FOR RETINOPATHY: ICD-10-PCS | Mod: CPTII,,, | Performed by: NURSE PRACTITIONER

## 2023-07-12 PROCEDURE — 3008F BODY MASS INDEX DOCD: CPT | Mod: CPTII,,, | Performed by: NURSE PRACTITIONER

## 2023-07-12 PROCEDURE — 73030 XR SHOULDER COMPLETE 2 OR MORE VIEWS RIGHT: ICD-10-PCS | Mod: RT,,, | Performed by: ORTHOPAEDIC SURGERY

## 2023-07-12 PROCEDURE — 1159F PR MEDICATION LIST DOCUMENTED IN MEDICAL RECORD: ICD-10-PCS | Mod: CPTII,,, | Performed by: NURSE PRACTITIONER

## 2023-07-12 NOTE — PROGRESS NOTES
Chief Complaint:   Chief Complaint   Patient presents with    Right Shoulder - Pain, Follow-up     Right proximal humerus fx 1/25/23. Patient is still having pain under the arm. xrays done today. Cannot sleep well on shoulder and always sore when she wakes up.        Consulting Physician: No ref. provider found    History of present illness:  1/25/23: Right Proximal Humerus Fracture, nonoperative treatment    She returns today.  She has continued pain laterally in the shoulder. It is worse with activity. She has trouble sleeping at night. She also reports continued numbness laterally in the shoulder. She denies shoulder pain or numbness/tingling distally.     Past Medical History:   Diagnosis Date    Cataract     Diabetes mellitus     Glaucoma     Stroke 2014       Past Surgical History:   Procedure Laterality Date    COLONOSCOPY N/A 10/13/2022    Procedure: COLONOSCOPY;  Surgeon: Yusuf Iqbal MD;  Location: [facility] ENDOSCOPY;  Service: Endoscopy;  Laterality: N/A;  Hold Plavix 5 days- confirmed with pt-lw    HYSTERECTOMY  01/01/1984       Current Outpatient Medications   Medication Sig    amLODIPine (NORVASC) 10 MG tablet Take 1 tablet (10 mg total) by mouth once daily.    aspirin (ECOTRIN) 81 MG EC tablet Take 81 mg by mouth once daily.    atorvastatin (LIPITOR) 20 MG tablet Take 1 tablet (20 mg total) by mouth once daily.    brimonidine 0.15 % OPTH DROP (ALPHAGAN) 0.15 % ophthalmic solution Place 1 drop into both eyes 3 (three) times daily.    brimonidine 0.2% (ALPHAGAN) 0.2 % Drop Place 1 drop into both eyes 3 (three) times daily.    clopidogreL (PLAVIX) 75 mg tablet Take 1 tablet (75 mg total) by mouth once daily.    diclofenac sodium (VOLTAREN) 1 % Gel Apply 2 g topically 4 (four) times daily.    dorzolamide-timolol 2-0.5% (COSOPT) 22.3-6.8 mg/mL ophthalmic solution Place 1 drop into both eyes 2 (two) times daily.    ergocalciferol (ERGOCALCIFEROL) 50,000 unit Cap Take 1 capsule (50,000 Units total) by  mouth every 7 days.    hydroCHLOROthiazide (HYDRODIURIL) 12.5 MG Tab Take 1 tablet (12.5 mg total) by mouth once daily.    latanoprost 0.005 % ophthalmic solution Place 1 drop into both eyes every evening.    levETIRAcetam (KEPPRA) 500 MG Tab Take 1 tablet (500 mg total) by mouth 2 (two) times daily.    lisinopriL (PRINIVIL,ZESTRIL) 40 MG tablet Take 1 tablet (40 mg total) by mouth once daily.    metFORMIN (GLUCOPHAGE) 500 MG tablet Take 1 tablet (500 mg total) by mouth daily with breakfast.    MOVIPREP 100-7.5-2.691 gram solution Take by mouth.    venlafaxine (EFFEXOR-XR) 75 MG 24 hr capsule Take 1 capsule (75 mg total) by mouth once daily.     No current facility-administered medications for this visit.       Review of patient's allergies indicates:  No Known Allergies    Family History   Problem Relation Age of Onset    Diabetes Mother     Glaucoma Mother     Vision loss Maternal Grandmother     Hypertension Sister     No Known Problems Father     No Known Problems Brother     No Known Problems Maternal Aunt     No Known Problems Maternal Uncle     No Known Problems Paternal Aunt     No Known Problems Paternal Uncle     No Known Problems Maternal Grandfather     No Known Problems Paternal Grandmother     No Known Problems Paternal Grandfather     Amblyopia Neg Hx     Blindness Neg Hx     Cancer Neg Hx     Cataracts Neg Hx     Macular degeneration Neg Hx     Retinal detachment Neg Hx     Strabismus Neg Hx     Stroke Neg Hx     Thyroid disease Neg Hx        Social History     Socioeconomic History    Marital status: Single   Tobacco Use    Smoking status: Never    Smokeless tobacco: Never   Substance and Sexual Activity    Alcohol use: Never    Drug use: Never    Sexual activity: Not Currently     Social Determinants of Health     Financial Resource Strain: Low Risk     Difficulty of Paying Living Expenses: Not very hard   Food Insecurity: No Food Insecurity    Worried About Running Out of Food in the Last Year:  "Never true    Ran Out of Food in the Last Year: Never true   Transportation Needs: No Transportation Needs    Lack of Transportation (Medical): No    Lack of Transportation (Non-Medical): No   Physical Activity: Sufficiently Active    Days of Exercise per Week: 6 days    Minutes of Exercise per Session: 60 min   Stress: No Stress Concern Present    Feeling of Stress : Not at all   Social Connections: Moderately Integrated    Frequency of Communication with Friends and Family: Twice a week    Frequency of Social Gatherings with Friends and Family: Once a week    Attends [employer] Services: More than 4 times per year    Active Member of Clubs or Organizations: No    Attends Club or Organization Meetings: Never    Marital Status:    Housing Stability: Low Risk     Unable to Pay for Housing in the Last Year: No    Number of Places Lived in the Last Year: 1    Unstable Housing in the Last Year: No       Review of Systems:    Constitution:   Denies chills, fever, and sweats.  HENT:   Denies headaches or blurry vision.  Cardiovascular:  Denies chest pain or irregular heart beat.  Respiratory:   Denies cough or shortness of breath.  Gastrointestinal:  Denies abdominal pain, nausea, or vomiting.  Musculoskeletal:   Denies muscle cramps.  Neurological:   Denies dizziness or focal weakness.  Psychiatric/Behavior: Normal mental status.  Hematology/Lymph:  Denies bleeding problem or easy bruising/bleeding.  Skin:    Denies rash or suspicious lesions.    Examination:    Vital Signs:    Vitals:    07/12/23 1405   Weight: 78.9 kg (174 lb)   Height: 5' 7" (1.702 m)       Body mass index is 27.25 kg/m².    Constitution:   Well-developed, well nourished patient in no acute distress.  Neurological:   Alert and oriented x 3 and cooperative to examination.     Psychiatric/Behavior: Normal mental status.  Respiratory:   No shortness of breath.  Cards:   Pulses palpable, brisk cap refill  Eyes:    Extraoccular muscles intact  Skin: "    No scars, rash or suspicious lesions.    MSK:   Shoulder Exam:                   Right        Left  Skin:                                   Normal     Normal  AC joint tenderness:           None         None  Forward Flexion:                110            180  Abduction:                          90           180  External Rotation:               80              80  Internal Rotation:                20             80  Supraspinatus stress test: Neg           Neg  Hawkin's Impingement:     Neg           Neg  Neer Impingement:             +              Neg  Apprehension:                   Neg           Neg  Cullen's:                           Neg           Neg  Speed's test:                     Neg            Neg  Strength:  External Rotation:           5/5                5/5  Lift Off/belly press:          5/5                5/5    N-V status:                   Intact             Intact    C-spine: Normal ROM, NT      Imaging: X-rays ordered and images interpreted today personally by me of four views of the right shoulder show healed fracture.         Assessment: Other closed nondisplaced fracture of proximal end of right humerus with routine healing, subsequent encounter  -     X-ray Shoulder 2 or More Views Right; Future; Expected date: 07/12/2023  -     Cancel: Ambulatory referral/consult to Neurology; Future; Expected date: 07/19/2023  -     Cancel: Ambulatory referral/consult to Neurology; Future; Expected date: 07/19/2023  -     Ambulatory referral/consult to Neurology; Future; Expected date: 07/19/2023    Chronic right shoulder pain  -     MRI Shoulder Without Contrast Right; Future; Expected date: 07/12/2023        Plan:  We discussed injection vs MRI. She would like to get an MRI to evaluate her shoulder further. We will also get an EMG for her persistent numbness. We will see her back after for results.

## 2023-07-17 DIAGNOSIS — Z00.00 HEALTHCARE MAINTENANCE: Primary | ICD-10-CM

## 2023-07-20 RX ORDER — ERGOCALCIFEROL 1.25 MG/1
50000 CAPSULE ORAL
Qty: 12 CAPSULE | Refills: 0 | Status: SHIPPED | OUTPATIENT
Start: 2023-07-20

## 2023-08-09 ENCOUNTER — OFFICE VISIT (OUTPATIENT)
Dept: ORTHOPEDICS | Facility: CLINIC | Age: 63
End: 2023-08-09
Payer: MEDICARE

## 2023-08-09 VITALS
DIASTOLIC BLOOD PRESSURE: 85 MMHG | WEIGHT: 174 LBS | HEART RATE: 74 BPM | SYSTOLIC BLOOD PRESSURE: 125 MMHG | HEIGHT: 67 IN | BODY MASS INDEX: 27.31 KG/M2

## 2023-08-09 DIAGNOSIS — M75.21 BICEPS TENDINITIS, RIGHT: ICD-10-CM

## 2023-08-09 DIAGNOSIS — M75.81 TENDINITIS OF RIGHT ROTATOR CUFF: Primary | ICD-10-CM

## 2023-08-09 DIAGNOSIS — S42.294D OTHER CLOSED NONDISPLACED FRACTURE OF PROXIMAL END OF RIGHT HUMERUS WITH ROUTINE HEALING, SUBSEQUENT ENCOUNTER: ICD-10-CM

## 2023-08-09 PROCEDURE — 3066F NEPHROPATHY DOC TX: CPT | Mod: CPTII,,, | Performed by: ORTHOPAEDIC SURGERY

## 2023-08-09 PROCEDURE — 3066F PR DOCUMENTATION OF TREATMENT FOR NEPHROPATHY: ICD-10-PCS | Mod: CPTII,,, | Performed by: ORTHOPAEDIC SURGERY

## 2023-08-09 PROCEDURE — 3061F PR NEG MICROALBUMINURIA RESULT DOCUMENTED/REVIEW: ICD-10-PCS | Mod: CPTII,,, | Performed by: ORTHOPAEDIC SURGERY

## 2023-08-09 PROCEDURE — 3008F PR BODY MASS INDEX (BMI) DOCUMENTED: ICD-10-PCS | Mod: CPTII,,, | Performed by: ORTHOPAEDIC SURGERY

## 2023-08-09 PROCEDURE — 3044F PR MOST RECENT HEMOGLOBIN A1C LEVEL <7.0%: ICD-10-PCS | Mod: CPTII,,, | Performed by: ORTHOPAEDIC SURGERY

## 2023-08-09 PROCEDURE — 3074F SYST BP LT 130 MM HG: CPT | Mod: CPTII,,, | Performed by: ORTHOPAEDIC SURGERY

## 2023-08-09 PROCEDURE — 3079F PR MOST RECENT DIASTOLIC BLOOD PRESSURE 80-89 MM HG: ICD-10-PCS | Mod: CPTII,,, | Performed by: ORTHOPAEDIC SURGERY

## 2023-08-09 PROCEDURE — 3072F PR LOW RISK FOR RETINOPATHY: ICD-10-PCS | Mod: CPTII,,, | Performed by: ORTHOPAEDIC SURGERY

## 2023-08-09 PROCEDURE — 3079F DIAST BP 80-89 MM HG: CPT | Mod: CPTII,,, | Performed by: ORTHOPAEDIC SURGERY

## 2023-08-09 PROCEDURE — 3072F LOW RISK FOR RETINOPATHY: CPT | Mod: CPTII,,, | Performed by: ORTHOPAEDIC SURGERY

## 2023-08-09 PROCEDURE — 3074F PR MOST RECENT SYSTOLIC BLOOD PRESSURE < 130 MM HG: ICD-10-PCS | Mod: CPTII,,, | Performed by: ORTHOPAEDIC SURGERY

## 2023-08-09 PROCEDURE — 3008F BODY MASS INDEX DOCD: CPT | Mod: CPTII,,, | Performed by: ORTHOPAEDIC SURGERY

## 2023-08-09 PROCEDURE — 3044F HG A1C LEVEL LT 7.0%: CPT | Mod: CPTII,,, | Performed by: ORTHOPAEDIC SURGERY

## 2023-08-09 PROCEDURE — 99213 PR OFFICE/OUTPT VISIT, EST, LEVL III, 20-29 MIN: ICD-10-PCS | Mod: 25,,, | Performed by: ORTHOPAEDIC SURGERY

## 2023-08-09 PROCEDURE — 1159F PR MEDICATION LIST DOCUMENTED IN MEDICAL RECORD: ICD-10-PCS | Mod: CPTII,,, | Performed by: ORTHOPAEDIC SURGERY

## 2023-08-09 PROCEDURE — 20610 DRAIN/INJ JOINT/BURSA W/O US: CPT | Mod: RT,,, | Performed by: ORTHOPAEDIC SURGERY

## 2023-08-09 PROCEDURE — 20610 LARGE JOINT ASPIRATION/INJECTION: R SUBACROMIAL BURSA: ICD-10-PCS | Mod: RT,,, | Performed by: ORTHOPAEDIC SURGERY

## 2023-08-09 PROCEDURE — 1159F MED LIST DOCD IN RCRD: CPT | Mod: CPTII,,, | Performed by: ORTHOPAEDIC SURGERY

## 2023-08-09 PROCEDURE — 3061F NEG MICROALBUMINURIA REV: CPT | Mod: CPTII,,, | Performed by: ORTHOPAEDIC SURGERY

## 2023-08-09 PROCEDURE — 4010F ACE/ARB THERAPY RXD/TAKEN: CPT | Mod: CPTII,,, | Performed by: ORTHOPAEDIC SURGERY

## 2023-08-09 PROCEDURE — 4010F PR ACE/ARB THEARPY RXD/TAKEN: ICD-10-PCS | Mod: CPTII,,, | Performed by: ORTHOPAEDIC SURGERY

## 2023-08-09 PROCEDURE — 99213 OFFICE O/P EST LOW 20 MIN: CPT | Mod: 25,,, | Performed by: ORTHOPAEDIC SURGERY

## 2023-08-09 RX ORDER — LIDOCAINE HYDROCHLORIDE 20 MG/ML
5 INJECTION, SOLUTION EPIDURAL; INFILTRATION; INTRACAUDAL; PERINEURAL
Status: DISCONTINUED | OUTPATIENT
Start: 2023-08-09 | End: 2023-08-09 | Stop reason: HOSPADM

## 2023-08-09 RX ORDER — BETAMETHASONE SODIUM PHOSPHATE AND BETAMETHASONE ACETATE 3; 3 MG/ML; MG/ML
6 INJECTION, SUSPENSION INTRA-ARTICULAR; INTRALESIONAL; INTRAMUSCULAR; SOFT TISSUE
Status: DISCONTINUED | OUTPATIENT
Start: 2023-08-09 | End: 2023-08-09 | Stop reason: HOSPADM

## 2023-08-09 RX ADMIN — LIDOCAINE HYDROCHLORIDE 5 ML: 20 INJECTION, SOLUTION EPIDURAL; INFILTRATION; INTRACAUDAL; PERINEURAL at 10:08

## 2023-08-09 RX ADMIN — BETAMETHASONE SODIUM PHOSPHATE AND BETAMETHASONE ACETATE 6 MG: 3; 3 INJECTION, SUSPENSION INTRA-ARTICULAR; INTRALESIONAL; INTRAMUSCULAR; SOFT TISSUE at 10:08

## 2023-08-09 NOTE — PROCEDURES
Large Joint Aspiration/Injection: R subacromial bursa    Date/Time: 8/9/2023 10:15 AM    Performed by: Carlos Turner Jr., MD  Authorized by: Carlos Turner Jr., MD    Consent Done?:  Yes (Verbal)  Indications:  Pain  Site marked: the procedure site was marked    Timeout: prior to procedure the correct patient, procedure, and site was verified    Prep: patient was prepped and draped in usual sterile fashion      Local anesthesia used?: Yes    Local anesthetic:  Topical anesthetic    Details:  Needle Size:  21 G  Ultrasonic Guidance for needle placement?: No    Approach:  Posterior  Location:  Shoulder  Site:  R subacromial bursa  Medications:  5 mL LIDOcaine (PF) 20 mg/mL (2%) 20 mg/mL (2 %); 6 mg betamethasone acetate-betamethasone sodium phosphate 6 mg/mL  Patient tolerance:  Patient tolerated the procedure well with no immediate complications

## 2023-08-09 NOTE — PROGRESS NOTES
Chief Complaint:   Chief Complaint   Patient presents with    Right Shoulder - Pain    Shoulder Pain     MRI results of right shoulder, still has some pain in shoulder       Consulting Physician: No ref. provider found    History of present illness:  1/25/23: Right Proximal Humerus Fracture, nonoperative treatment    She returns today.  She has continued pain laterally in the shoulder. It is worse with activity. She has trouble sleeping at night. She also reports continued numbness laterally in the shoulder. She denies shoulder pain or numbness/tingling distally.     She returns today status post MRI.    Past Medical History:   Diagnosis Date    Cataract     Diabetes mellitus     Glaucoma     Stroke 2014       Past Surgical History:   Procedure Laterality Date    COLONOSCOPY N/A 10/13/2022    Procedure: COLONOSCOPY;  Surgeon: Yusuf Iqbal MD;  Location: [facility] ENDOSCOPY;  Service: Endoscopy;  Laterality: N/A;  Hold Plavix 5 days- confirmed with pt-lw    HYSTERECTOMY  01/01/1984       Current Outpatient Medications   Medication Sig    amLODIPine (NORVASC) 10 MG tablet Take 1 tablet (10 mg total) by mouth once daily.    aspirin (ECOTRIN) 81 MG EC tablet Take 81 mg by mouth once daily.    atorvastatin (LIPITOR) 20 MG tablet Take 1 tablet (20 mg total) by mouth once daily.    brimonidine 0.2% (ALPHAGAN) 0.2 % Drop Place 1 drop into both eyes 3 (three) times daily.    clopidogreL (PLAVIX) 75 mg tablet Take 1 tablet (75 mg total) by mouth once daily.    diclofenac sodium (VOLTAREN) 1 % Gel Apply 2 g topically 4 (four) times daily.    dorzolamide-timolol 2-0.5% (COSOPT) 22.3-6.8 mg/mL ophthalmic solution Place 1 drop into both eyes 2 (two) times daily.    ergocalciferol (ERGOCALCIFEROL) 50,000 unit Cap Take 1 capsule (50,000 Units total) by mouth every 7 days.    hydroCHLOROthiazide (HYDRODIURIL) 12.5 MG Tab Take 1 tablet (12.5 mg total) by mouth once daily.    latanoprost 0.005 % ophthalmic solution Place 1 drop into  both eyes every evening.    levETIRAcetam (KEPPRA) 500 MG Tab Take 1 tablet (500 mg total) by mouth 2 (two) times daily.    lisinopriL (PRINIVIL,ZESTRIL) 40 MG tablet Take 1 tablet (40 mg total) by mouth once daily.    metFORMIN (GLUCOPHAGE) 500 MG tablet Take 1 tablet (500 mg total) by mouth daily with breakfast.    MOVIPREP 100-7.5-2.691 gram solution Take by mouth.    venlafaxine (EFFEXOR-XR) 75 MG 24 hr capsule Take 1 capsule (75 mg total) by mouth once daily.    brimonidine 0.15 % OPTH DROP (ALPHAGAN) 0.15 % ophthalmic solution Place 1 drop into both eyes 3 (three) times daily.     No current facility-administered medications for this visit.       Review of patient's allergies indicates:  No Known Allergies    Family History   Problem Relation Age of Onset    Diabetes Mother     Glaucoma Mother     Vision loss Maternal Grandmother     Hypertension Sister     No Known Problems Father     No Known Problems Brother     No Known Problems Maternal Aunt     No Known Problems Maternal Uncle     No Known Problems Paternal Aunt     No Known Problems Paternal Uncle     No Known Problems Maternal Grandfather     No Known Problems Paternal Grandmother     No Known Problems Paternal Grandfather     Amblyopia Neg Hx     Blindness Neg Hx     Cancer Neg Hx     Cataracts Neg Hx     Macular degeneration Neg Hx     Retinal detachment Neg Hx     Strabismus Neg Hx     Stroke Neg Hx     Thyroid disease Neg Hx        Social History     Socioeconomic History    Marital status: Single   Tobacco Use    Smoking status: Never    Smokeless tobacco: Never   Substance and Sexual Activity    Alcohol use: Never    Drug use: Never    Sexual activity: Not Currently     Partners: Male     Birth control/protection: Abstinence     Social Determinants of Health     Financial Resource Strain: Low Risk  (8/30/2022)    Overall Financial Resource Strain ([facility])     Difficulty of Paying Living Expenses: Not very hard   Food Insecurity: No Food  Insecurity (8/30/2022)    Hunger Vital Sign     Worried About Running Out of Food in the Last Year: Never true     Ran Out of Food in the Last Year: Never true   Transportation Needs: No Transportation Needs (8/30/2022)    PRAPARE - Transportation     Lack of Transportation (Medical): No     Lack of Transportation (Non-Medical): No   Physical Activity: Sufficiently Active (8/30/2022)    Exercise Vital Sign     Days of Exercise per Week: 6 days     Minutes of Exercise per Session: 60 min   Stress: No Stress Concern Present (8/30/2022)    [employer] - Occupational Stress Questionnaire     Feeling of Stress : Not at all   Social Connections: Moderately Integrated (8/30/2022)    Social Connection and Isolation Panel [NHANES]     Frequency of Communication with Friends and Family: Twice a week     Frequency of Social Gatherings with Friends and Family: Once a week     Attends [employer] Services: More than 4 times per year     Active Member of Clubs or Organizations: No     Attends Club or Organization Meetings: Never     Marital Status:    Housing Stability: Low Risk  (8/30/2022)    Housing Stability Vital Sign     Unable to Pay for Housing in the Last Year: No     Number of Places Lived in the Last Year: 1     Unstable Housing in the Last Year: No       Review of Systems:    Constitution:   Denies chills, fever, and sweats.  HENT:   Denies headaches or blurry vision.  Cardiovascular:  Denies chest pain or irregular heart beat.  Respiratory:   Denies cough or shortness of breath.  Gastrointestinal:  Denies abdominal pain, nausea, or vomiting.  Musculoskeletal:   Denies muscle cramps.  Neurological:   Denies dizziness or focal weakness.  Psychiatric/Behavior: Normal mental status.  Hematology/Lymph:  Denies bleeding problem or easy bruising/bleeding.  Skin:    Denies rash or suspicious lesions.    Examination:    Vital Signs:    Vitals:    08/09/23 1011 08/09/23 1012   BP: 125/85   "  Pulse: 74    Weight: 78.9 kg (174 lb)    Height: 5' 7" (1.702 m)    PainSc:    5       Body mass index is 27.25 kg/m².    Constitution:   Well-developed, well nourished patient in no acute distress.  Neurological:   Alert and oriented x 3 and cooperative to examination.     Psychiatric/Behavior: Normal mental status.  Respiratory:   No shortness of breath.  Cards:   Pulses palpable, brisk cap refill  Eyes:    Extraoccular muscles intact  Skin:    No scars, rash or suspicious lesions.    MSK:   Shoulder Exam:                   Right        Left  Skin:                                   Normal     Normal  AC joint tenderness:           None         None  Forward Flexion:                110            180  Abduction:                          90           180  External Rotation:               80              80  Internal Rotation:                20             80  Supraspinatus stress test: Neg           Neg  Hawkin's Impingement:     Neg           Neg  Neer Impingement:             +              Neg  Apprehension:                   Neg           Neg  Graves's:                           Neg           Neg  Speed's test:                     Neg            Neg  Strength:  External Rotation:           5/5                5/5  Lift Off/belly press:          5/5                5/5    N-V status:                   Intact             Intact    C-spine: Normal ROM, NT      Imaging:  MRI was reviewed which shows rotator cuff tendinitis with biceps tendinitis.  Fracture appears healed.       Assessment: Tendinitis of right rotator cuff    Biceps tendinitis, right    Other closed nondisplaced fracture of proximal end of right humerus with routine healing, subsequent encounter        Plan:  We are going to try a subacromial injection today.  I will call her with her EMG results.        "

## 2023-08-15 ENCOUNTER — OFFICE VISIT (OUTPATIENT)
Dept: INTERNAL MEDICINE | Facility: CLINIC | Age: 63
End: 2023-08-15
Payer: MEDICARE

## 2023-08-15 VITALS
HEIGHT: 67 IN | BODY MASS INDEX: 27.3 KG/M2 | DIASTOLIC BLOOD PRESSURE: 84 MMHG | WEIGHT: 173.94 LBS | TEMPERATURE: 102 F | HEART RATE: 52 BPM | SYSTOLIC BLOOD PRESSURE: 134 MMHG

## 2023-08-15 DIAGNOSIS — E55.9 VITAMIN D DEFICIENCY, UNSPECIFIED: ICD-10-CM

## 2023-08-15 DIAGNOSIS — N18.2 TYPE 2 DIABETES MELLITUS WITH STAGE 2 CHRONIC KIDNEY DISEASE, WITHOUT LONG-TERM CURRENT USE OF INSULIN: Primary | ICD-10-CM

## 2023-08-15 DIAGNOSIS — N18.2 CHRONIC KIDNEY DISEASE, STAGE II (MILD): ICD-10-CM

## 2023-08-15 DIAGNOSIS — E11.22 TYPE 2 DIABETES MELLITUS WITH STAGE 2 CHRONIC KIDNEY DISEASE, WITHOUT LONG-TERM CURRENT USE OF INSULIN: Primary | ICD-10-CM

## 2023-08-15 PROCEDURE — 99213 OFFICE O/P EST LOW 20 MIN: CPT | Mod: PBBFAC | Performed by: INTERNAL MEDICINE

## 2023-08-15 NOTE — PROGRESS NOTES
[facility] INTERNAL MEDICINE  OUTPATIENT OFFICE VISIT NOTE    SUBJECTIVE:    HPI: Alyssa Anderson is a 61 y.o. yo female non-insulin diabetes mellitus type II, and CVA.  Patient was previously seen by another provider at this clinic.  Last hemoglobin A1c 5.1, currently on Metformin 500 mg daily.  She currently follows Neurology due to history of stroke, currently on Plavix and aspirin. No new symptoms. Patient has residual symptoms: expressive aphasia.  She reports that she had seizures in the past and was placed on Keppra by Neurology. She states that she stopped taking the medication and started having seizures. She started the medication again and reports she has not had a seizure in many years.  Patient states that she fell in the parking lot. She states that right shoulder is still hurting and reports numbness.   Patient denies chest pain, palpitations, and shortness of breath.   Patient denies fever, night sweats, chills, nausea, vomiting, diarrhea, constipation, weight loss, and changes in appetite.    Review of Systems:  Constitutional: No fever, No chills, No sweats, No weakness, No fatigue, No decreased activity.   Eye: No recent visual problem, No icterus, No double vision.  Ear/Nose/Mouth/Throat: No nasal congestion, No sore throat.   Respiratory: No shortness of breath, No cough, No sputum production, No hemoptysis,   No wheezing, No cyanosis.   Cardiovascular: No chest pain, No palpitations, No peripheral edema, No syncope.   Gastrointestinal: No nausea, No vomiting, No diarrhea, No constipation, No hematemesis.   Genitourinary: No dysuria, No hematuria, No change in urine stream, No urethral discharge.   Hematology/Lymphatics: No bruising tendency, No bleeding tendency.   Endocrine: No polyuria, No cold intolerance, No heat intolerance.   Immunologic: Not immunocompromised, No recurrent fevers.   Musculoskeletal: No joint pain, No claudication.   Integumentary: No rash, No pruritus, No abrasions, No  "petechiae.    Neurologic: Alert and oriented X4, No abnormal balance, No numbness, No tingling.   Psychiatric: No anxiety, no depression, Not suicidal, Not delusional, No hallucinations.     OBJECTIVE:    Vitals:   /84   Pulse (!) 52   Temp (!) 101.5 °F (38.6 °C)   Ht 5' 6.93" (1.7 m)   Wt 78.9 kg (173 lb 15.1 oz)   BMI 27.30 kg/m²      Physical Exam:  General: Alert and oriented, No acute distress.   Eye: Pupils are equal, round and reactive to light, Extraocular movements are intact,   Normal conjunctiva, Vision unchanged.   HENT: Normocephalic, Normal hearing, Oral mucosa is moist.   Neck: Supple, No carotid bruit, No jugular venous distention, No thyromegaly.   Respiratory: Lungs are clear to auscultation, Respirations are non-labored, Breath sounds   are equal, Symmetrical chest wall expansion, No chest wall tenderness.   Cardiovascular: Normal rate, Regular rhythm, No murmur, No gallop, Good pulses equal in   All extremities, Normal peripheral perfusion, No edema.   Gastrointestinal: Soft, Non-tender, Non-distended, Normal bowel sounds.   Genitourinary: No costovertebral angle tenderness, No suprapubic tenderness.   Musculoskeletal: Normal range of motion, Normal strength, No swelling, No deformity, Normal gait.   Integumentary: Warm, No pallor, No rash.   Neurologic: Alert, Oriented, Normal sensory, Normal motor function, No focal deficits,   Cranial Nerves II-XII are grossly intact, Monofilament examination: No sensory loss on plantar   or dorsal surface of feet bilaterally.   Psychiatric: Cooperative, Appropriate mood & affect.     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 She reports this injury " affects her activities of daily living. Patient reports she can not bathe, sew, and can not carry heavy items. Sister does laundry for her at this time because she can not complete on her own.   Last hemoglobin A1C 5.5 continue Metformin 500 mg daily. Diabetic fundus photos no retinopathy, last appointment with Ophthalmology. Monofilament examination was normal 6/2023, next screening 6/2024. Lifestyle modifications.  US limited retroperitoneum- unremarkable renal ultrasound. Avoid nephrotoxic drugs. Referral to Nephrology completed.   Blood pressure 134/84. Continue medications Lisinopril 40 mg daily, Norvasc 10 mg daily, and Hydrochlorothiazide 12.5 mg daily. Patient reports systolic blood pressure 100-115.  Patient reports no new seizures. Continue Keppra. Patient is on 500 mg BID per last note. Continue to follow Neurology. Neurology has recommended to continue Plavix and Aspirin.  Vitamin D 13.1. Previously 21.4. Completed on Vitamin D weekly for 12 weeks. Recheck at next visit.   Controlled with Effexor, which she was started on by another provider.  Continue to follow up with Ophthalmology and continue eyedrops.    Discussed weight loss options, patient is overweight.  At next visit, will discuss referring to Nephrology.     Yvonne Adkins MD  [facility]

## 2023-08-28 ENCOUNTER — OFFICE VISIT (OUTPATIENT)
Dept: OPHTHALMOLOGY | Facility: CLINIC | Age: 63
End: 2023-08-28
Payer: MEDICARE

## 2023-08-28 VITALS — HEIGHT: 67 IN | WEIGHT: 173.94 LBS | BODY MASS INDEX: 27.3 KG/M2

## 2023-08-28 DIAGNOSIS — N18.2 TYPE 2 DIABETES MELLITUS WITH STAGE 2 CHRONIC KIDNEY DISEASE, WITHOUT LONG-TERM CURRENT USE OF INSULIN: ICD-10-CM

## 2023-08-28 DIAGNOSIS — H25.13 AGE-RELATED NUCLEAR CATARACT OF BOTH EYES: ICD-10-CM

## 2023-08-28 DIAGNOSIS — H40.1113 PRIMARY OPEN ANGLE GLAUCOMA (POAG) OF RIGHT EYE, SEVERE STAGE: ICD-10-CM

## 2023-08-28 DIAGNOSIS — H40.1122 PRIMARY OPEN ANGLE GLAUCOMA (POAG) OF LEFT EYE, MODERATE STAGE: Primary | ICD-10-CM

## 2023-08-28 DIAGNOSIS — E11.22 TYPE 2 DIABETES MELLITUS WITH STAGE 2 CHRONIC KIDNEY DISEASE, WITHOUT LONG-TERM CURRENT USE OF INSULIN: ICD-10-CM

## 2023-08-28 PROCEDURE — 92133 CPTRZD OPH DX IMG PST SGM ON: CPT | Mod: PBBFAC,PO | Performed by: OPHTHALMOLOGY

## 2023-08-28 PROCEDURE — 92134 CPTRZ OPH DX IMG PST SGM RTA: CPT | Mod: PBBFAC,PO

## 2023-08-28 PROCEDURE — 99213 OFFICE O/P EST LOW 20 MIN: CPT | Mod: PBBFAC,PO

## 2023-08-28 NOTE — PROGRESS NOTES
HPI     Primary open angle glaucoma     Additional comments: Mrx, iop check, DFE, OCT nerve            Comments    Primary open angle glaucoma          Last edited by Anselmo Pina MA on 8/28/2023  1:34 PM.        HPI     Primary open angle glaucoma     Additional comments: Mrx, iop check, DFE, OCT nerve            Comments    Primary open angle glaucoma          Last edited by Anselmo Pina MA on 8/28/2023  1:34 PM.            HPI     Primary open angle glaucoma     Additional comments: Mrx, iop check, DFE, OCT nerve            Comments    Primary open angle glaucoma          Last edited by Anselmo Pina MA on 8/28/2023  1:34 PM.      Assessment /Plan     For exam results, see Encounter Report.    There are no diagnoses linked to this encounter.      OCT RNFL   08/028/23: 49//77; red STI, green N // all green: stable  08/25/22: 50//76; all red // yel I    OCT Mac 8/3/22  - OD: 207, generalized retinal atrophy  - OS: 249, good foveal contour     HVF 8/3/22  - OD: 11/12 FL 38% FP 21% FN, unreliable dense SAD  - OS: 4/11 2% FP 0% FN, unreliable, nonspecific deficits     HVF 12/27/22  - OD: 7/13 FL 8% FP 8% FN, dense superior defect mostly respecting horizontal; inf NS  - OS: 3/11 3% FP 5% FN, scattered inf and nasal misses - not matching prior misses     A/P:    1. POAG, Right Eye, Severe  2. POAG, Left Eye, Moderate  - + FH, thin pachy  - gonio 2/3/22 open 360 OU  - target pressure 13 // 13, unknown tmax  - OCT RNFL 08/21 45 all red // 76 yellow I, rest green. Increased thinning OS compared to 8/2021  - HVF stable w superior hemifield defect OD and scattered non-specific defects OS - **Hx of stroke in 2014 affecting right eye vision   - Continue xalantan QHS, cosopt BID, brimonidine TID  - Monocular precautions given  - OCT RNFL stable from prior  - At this time, will not proceed with SLT or xen gel OS. OD is comfort care. Patient denies any pain  12/27/22: new VF with stable findings OD (dens sup alt  + early inf NS), os with nonspecific misses not consistent with prior. Continue drops as above. IOP doing well  - 8/28/2023: IOP 13 OU, at goal, OCT RNFL stable Cont drops, if IOP elevated at next, may need to rediscuss SLT OS     3. NSC OU  - NVS, CTM     4. DM II w/o Retinopathy  Hemoglobin A1c   Date Value Ref Range Status   04/25/2023 5.4 <=7.0 % Final   11/01/2022 5.1 <=7.0 % Final   09/08/2020 5.4 4.2 - 6.3 % Final   - encouraged good control/regular PCP f/u  - yearly DFE due 08/2023     5. PVD OU  - RD precautions  - monitor     6. Monocular status  - patient has polycarb glasses     RTC 4 months for HVF and iop check

## 2023-09-07 ENCOUNTER — PATIENT MESSAGE (OUTPATIENT)
Dept: RESEARCH | Facility: HOSPITAL | Age: 63
End: 2023-09-07
Payer: MEDICARE

## 2023-10-13 ENCOUNTER — LAB VISIT (OUTPATIENT)
Dept: LAB | Facility: HOSPITAL | Age: 63
End: 2023-10-13
Attending: INTERNAL MEDICINE
Payer: MEDICARE

## 2023-10-13 DIAGNOSIS — E55.9 VITAMIN D DEFICIENCY, UNSPECIFIED: ICD-10-CM

## 2023-10-13 DIAGNOSIS — Z00.00 HEALTHCARE MAINTENANCE: ICD-10-CM

## 2023-10-13 DIAGNOSIS — N18.2 TYPE 2 DIABETES MELLITUS WITH STAGE 2 CHRONIC KIDNEY DISEASE, WITHOUT LONG-TERM CURRENT USE OF INSULIN: ICD-10-CM

## 2023-10-13 DIAGNOSIS — E11.22 TYPE 2 DIABETES MELLITUS WITH STAGE 2 CHRONIC KIDNEY DISEASE, WITHOUT LONG-TERM CURRENT USE OF INSULIN: ICD-10-CM

## 2023-10-13 DIAGNOSIS — N18.2 CHRONIC KIDNEY DISEASE, STAGE II (MILD): ICD-10-CM

## 2023-10-13 LAB
ALBUMIN SERPL-MCNC: 3.8 G/DL (ref 3.4–4.8)
ALBUMIN/GLOB SERPL: 1 RATIO (ref 1.1–2)
ALP SERPL-CCNC: 90 UNIT/L (ref 40–150)
ALT SERPL-CCNC: 15 UNIT/L (ref 0–55)
AST SERPL-CCNC: 16 UNIT/L (ref 5–34)
BILIRUB SERPL-MCNC: 0.6 MG/DL
BUN SERPL-MCNC: 15.9 MG/DL (ref 9.8–20.1)
CALCIUM SERPL-MCNC: 9.9 MG/DL (ref 8.4–10.2)
CHLORIDE SERPL-SCNC: 105 MMOL/L (ref 98–107)
CO2 SERPL-SCNC: 26 MMOL/L (ref 23–31)
CREAT SERPL-MCNC: 1.42 MG/DL (ref 0.55–1.02)
DEPRECATED CALCIDIOL+CALCIFEROL SERPL-MC: 21.4 NG/ML (ref 30–80)
EST. AVERAGE GLUCOSE BLD GHB EST-MCNC: 114 MG/DL
GFR SERPLBLD CREATININE-BSD FMLA CKD-EPI: 42 MLS/MIN/1.73/M2
GLOBULIN SER-MCNC: 4 GM/DL (ref 2.4–3.5)
GLUCOSE SERPL-MCNC: 138 MG/DL (ref 82–115)
HBA1C MFR BLD: 5.6 %
HCV AB SERPL QL IA: NONREACTIVE
POTASSIUM SERPL-SCNC: 3.5 MMOL/L (ref 3.5–5.1)
PROT SERPL-MCNC: 7.8 GM/DL (ref 5.8–7.6)
SODIUM SERPL-SCNC: 140 MMOL/L (ref 136–145)

## 2023-10-13 PROCEDURE — 82306 VITAMIN D 25 HYDROXY: CPT

## 2023-10-13 PROCEDURE — 36415 COLL VENOUS BLD VENIPUNCTURE: CPT

## 2023-10-13 PROCEDURE — 80053 COMPREHEN METABOLIC PANEL: CPT

## 2023-10-13 PROCEDURE — 86803 HEPATITIS C AB TEST: CPT

## 2023-10-13 PROCEDURE — 83036 HEMOGLOBIN GLYCOSYLATED A1C: CPT

## 2023-10-16 RX ORDER — VENLAFAXINE HYDROCHLORIDE 75 MG/1
75 CAPSULE, EXTENDED RELEASE ORAL DAILY
Qty: 30 CAPSULE | Refills: 4 | Status: SHIPPED | OUTPATIENT
Start: 2023-10-16 | End: 2024-03-05 | Stop reason: SDUPTHER

## 2023-10-24 DIAGNOSIS — R56.9 SEIZURE: ICD-10-CM

## 2023-10-25 RX ORDER — LEVETIRACETAM 500 MG/1
500 TABLET ORAL 2 TIMES DAILY
Qty: 90 TABLET | Refills: 2 | Status: SHIPPED | OUTPATIENT
Start: 2023-10-25 | End: 2024-03-26 | Stop reason: SDUPTHER

## 2023-11-22 ENCOUNTER — OFFICE VISIT (OUTPATIENT)
Dept: ORTHOPEDICS | Facility: CLINIC | Age: 63
End: 2023-11-22
Payer: MEDICARE

## 2023-11-22 VITALS — SYSTOLIC BLOOD PRESSURE: 138 MMHG | HEART RATE: 64 BPM | DIASTOLIC BLOOD PRESSURE: 90 MMHG

## 2023-11-22 DIAGNOSIS — M75.81 TENDINITIS OF RIGHT ROTATOR CUFF: Primary | ICD-10-CM

## 2023-11-22 PROCEDURE — 3080F PR MOST RECENT DIASTOLIC BLOOD PRESSURE >= 90 MM HG: ICD-10-PCS | Mod: CPTII,,, | Performed by: ORTHOPAEDIC SURGERY

## 2023-11-22 PROCEDURE — 1159F MED LIST DOCD IN RCRD: CPT | Mod: CPTII,,, | Performed by: ORTHOPAEDIC SURGERY

## 2023-11-22 PROCEDURE — 3075F PR MOST RECENT SYSTOLIC BLOOD PRESS GE 130-139MM HG: ICD-10-PCS | Mod: CPTII,,, | Performed by: ORTHOPAEDIC SURGERY

## 2023-11-22 PROCEDURE — 3080F DIAST BP >= 90 MM HG: CPT | Mod: CPTII,,, | Performed by: ORTHOPAEDIC SURGERY

## 2023-11-22 PROCEDURE — 3075F SYST BP GE 130 - 139MM HG: CPT | Mod: CPTII,,, | Performed by: ORTHOPAEDIC SURGERY

## 2023-11-22 PROCEDURE — 3066F NEPHROPATHY DOC TX: CPT | Mod: CPTII,,, | Performed by: ORTHOPAEDIC SURGERY

## 2023-11-22 PROCEDURE — 4010F ACE/ARB THERAPY RXD/TAKEN: CPT | Mod: CPTII,,, | Performed by: ORTHOPAEDIC SURGERY

## 2023-11-22 PROCEDURE — 1159F PR MEDICATION LIST DOCUMENTED IN MEDICAL RECORD: ICD-10-PCS | Mod: CPTII,,, | Performed by: ORTHOPAEDIC SURGERY

## 2023-11-22 PROCEDURE — 3061F PR NEG MICROALBUMINURIA RESULT DOCUMENTED/REVIEW: ICD-10-PCS | Mod: CPTII,,, | Performed by: ORTHOPAEDIC SURGERY

## 2023-11-22 PROCEDURE — 99213 PR OFFICE/OUTPT VISIT, EST, LEVL III, 20-29 MIN: ICD-10-PCS | Mod: ,,, | Performed by: ORTHOPAEDIC SURGERY

## 2023-11-22 PROCEDURE — 4010F PR ACE/ARB THEARPY RXD/TAKEN: ICD-10-PCS | Mod: CPTII,,, | Performed by: ORTHOPAEDIC SURGERY

## 2023-11-22 PROCEDURE — 3044F HG A1C LEVEL LT 7.0%: CPT | Mod: CPTII,,, | Performed by: ORTHOPAEDIC SURGERY

## 2023-11-22 PROCEDURE — 3061F NEG MICROALBUMINURIA REV: CPT | Mod: CPTII,,, | Performed by: ORTHOPAEDIC SURGERY

## 2023-11-22 PROCEDURE — 3044F PR MOST RECENT HEMOGLOBIN A1C LEVEL <7.0%: ICD-10-PCS | Mod: CPTII,,, | Performed by: ORTHOPAEDIC SURGERY

## 2023-11-22 PROCEDURE — 99213 OFFICE O/P EST LOW 20 MIN: CPT | Mod: ,,, | Performed by: ORTHOPAEDIC SURGERY

## 2023-11-22 PROCEDURE — 3066F PR DOCUMENTATION OF TREATMENT FOR NEPHROPATHY: ICD-10-PCS | Mod: CPTII,,, | Performed by: ORTHOPAEDIC SURGERY

## 2023-11-22 NOTE — PROGRESS NOTES
Chief Complaint:   Chief Complaint   Patient presents with    Follow-up     Right shoulder pain is still present and ROM isnt great still , uses the voltaren gel is helping a little bit       Consulting Physician: No ref. provider found    History of present illness:  1/25/23: Right Proximal Humerus Fracture, nonoperative treatment    She returns today. The injection in August did not help. She has continued pain and but range of motion is improving.  She does have some continued numbness laterally in the shoulder but somewhat resolving. She is s/p EMG    Past Medical History:   Diagnosis Date    Cataract     Diabetes mellitus     Glaucoma     Stroke 2014       Past Surgical History:   Procedure Laterality Date    COLONOSCOPY N/A 10/13/2022    Procedure: COLONOSCOPY;  Surgeon: Yusuf Iqbal MD;  Location: [facility] ENDOSCOPY;  Service: Endoscopy;  Laterality: N/A;  Hold Plavix 5 days- confirmed with pt-lw    HYSTERECTOMY  01/01/1984       Current Outpatient Medications   Medication Sig    amLODIPine (NORVASC) 10 MG tablet Take 1 tablet (10 mg total) by mouth once daily.    aspirin (ECOTRIN) 81 MG EC tablet Take 81 mg by mouth once daily.    atorvastatin (LIPITOR) 20 MG tablet Take 1 tablet (20 mg total) by mouth once daily.    brimonidine 0.2% (ALPHAGAN) 0.2 % Drop Place 1 drop into both eyes 3 (three) times daily.    clopidogreL (PLAVIX) 75 mg tablet Take 1 tablet (75 mg total) by mouth once daily.    diclofenac sodium (VOLTAREN) 1 % Gel Apply 2 g topically 4 (four) times daily.    dorzolamide-timolol 2-0.5% (COSOPT) 22.3-6.8 mg/mL ophthalmic solution Place 1 drop into both eyes 2 (two) times daily.    ergocalciferol (ERGOCALCIFEROL) 50,000 unit Cap Take 1 capsule (50,000 Units total) by mouth every 7 days.    hydroCHLOROthiazide (HYDRODIURIL) 12.5 MG Tab Take 1 tablet (12.5 mg total) by mouth once daily.    latanoprost 0.005 % ophthalmic solution Place 1 drop into both eyes every evening.    levETIRAcetam (KEPPRA)  500 MG Tab Take 1 tablet (500 mg total) by mouth 2 (two) times daily.    lisinopriL (PRINIVIL,ZESTRIL) 40 MG tablet Take 1 tablet (40 mg total) by mouth once daily.    metFORMIN (GLUCOPHAGE) 500 MG tablet Take 1 tablet (500 mg total) by mouth daily with breakfast.    MOVIPREP 100-7.5-2.691 gram solution Take by mouth.    venlafaxine (EFFEXOR-XR) 75 MG 24 hr capsule Take 1 capsule (75 mg total) by mouth once daily.     No current facility-administered medications for this visit.       Review of patient's allergies indicates:  No Known Allergies    Family History   Problem Relation Age of Onset    Diabetes Mother     Glaucoma Mother     Vision loss Maternal Grandmother     Hypertension Sister     No Known Problems Father     No Known Problems Brother     No Known Problems Maternal Aunt     No Known Problems Maternal Uncle     No Known Problems Paternal Aunt     No Known Problems Paternal Uncle     No Known Problems Maternal Grandfather     No Known Problems Paternal Grandmother     No Known Problems Paternal Grandfather     Amblyopia Neg Hx     Blindness Neg Hx     Cancer Neg Hx     Cataracts Neg Hx     Macular degeneration Neg Hx     Retinal detachment Neg Hx     Strabismus Neg Hx     Stroke Neg Hx     Thyroid disease Neg Hx        Social History     Socioeconomic History    Marital status: Single   Tobacco Use    Smoking status: Never    Smokeless tobacco: Never   Substance and Sexual Activity    Alcohol use: Never    Drug use: Never    Sexual activity: Not Currently     Partners: Male     Birth control/protection: Abstinence     Social Determinants of Health     Financial Resource Strain: Low Risk  (8/30/2022)    Overall Financial Resource Strain (CARDIA)     Difficulty of Paying Living Expenses: Not very hard   Food Insecurity: No Food Insecurity (8/30/2022)    Hunger Vital Sign     Worried About Running Out of Food in the Last Year: Never true     Ran Out of Food in the Last Year: Never true   Transportation  Needs: No Transportation Needs (8/30/2022)    PRAPARE - Transportation     Lack of Transportation (Medical): No     Lack of Transportation (Non-Medical): No   Physical Activity: Sufficiently Active (8/30/2022)    Exercise Vital Sign     Days of Exercise per Week: 6 days     Minutes of Exercise per Session: 60 min   Stress: No Stress Concern Present (8/30/2022)    [employer] - Occupational Stress Questionnaire     Feeling of Stress : Not at all   Social Connections: Moderately Integrated (8/30/2022)    Social Connection and Isolation Panel [NHANES]     Frequency of Communication with Friends and Family: Twice a week     Frequency of Social Gatherings with Friends and Family: Once a week     Attends Hinduism Services: More than 4 times per year     Active Member of Clubs or Organizations: No     Attends Club or Organization Meetings: Never     Marital Status:    Housing Stability: Low Risk  (8/30/2022)    Housing Stability Vital Sign     Unable to Pay for Housing in the Last Year: No     Number of Places Lived in the Last Year: 1     Unstable Housing in the Last Year: No       Review of Systems:    Constitution:   Denies chills, fever, and sweats.  HENT:   Denies headaches or blurry vision.  Cardiovascular:  Denies chest pain or irregular heart beat.  Respiratory:   Denies cough or shortness of breath.  Gastrointestinal:  Denies abdominal pain, nausea, or vomiting.  Musculoskeletal:   Denies muscle cramps.  Neurological:   Denies dizziness or focal weakness.  Psychiatric/Behavior: Normal mental status.  Hematology/Lymph:  Denies bleeding problem or easy bruising/bleeding.  Skin:    Denies rash or suspicious lesions.    Examination:    Vital Signs:    Vitals:    11/22/23 1305   BP: (!) 138/90   Pulse: 64       There is no height or weight on file to calculate BMI.    Constitution:   Well-developed, well nourished patient in no acute distress.  Neurological:   Alert and oriented x 3  and cooperative to examination.     Psychiatric/Behavior: Normal mental status.  Respiratory:   No shortness of breath.  Cards:   Pulses palpable, brisk cap refill  Eyes:    Extraoccular muscles intact  Skin:    No scars, rash or suspicious lesions.    MSK:   Shoulder Exam:                   Right        Left  Skin:                                   Normal     Normal  AC joint tenderness:           None         None  Forward Flexion:                 150            180  Abduction:                          100             180  External Rotation:               80              80  Internal Rotation:                20             80  Supraspinatus stress test: Neg           Neg  Hawkin's Impingement:     Neg           Neg  Neer Impingement:             +              Neg  Apprehension:                   Neg           Neg  [place]'s:                           Neg           Neg  Speed's test:                     Neg            Neg  Strength:  External Rotation:           5/5                5/5  Lift Off/belly press:          5/5                5/5    N-V status:                   Intact             Intact    C-spine: Normal ROM, NT      Imaging:  MRI was reviewed which shows rotator cuff tendinitis with biceps tendinitis.  Fracture appears healed.       Assessment: Tendinitis of right rotator cuff          Plan:  Gradually improving.  She is going to continue home exercise program.  I will see her back if she has any issues

## 2023-12-07 ENCOUNTER — TELEPHONE (OUTPATIENT)
Dept: ORTHOPEDICS | Facility: CLINIC | Age: 63
End: 2023-12-07
Payer: MEDICARE

## 2023-12-07 NOTE — TELEPHONE ENCOUNTER
Per Lee's office -   Machine had a glitch the day patient went and they were unable to print study. They have reached out to patient multiple times to get her back in. Schedule and appointment but patient did not show up.

## 2024-01-02 RX ORDER — DORZOLAMIDE HYDROCHLORIDE AND TIMOLOL MALEATE 20; 5 MG/ML; MG/ML
1 SOLUTION/ DROPS OPHTHALMIC 2 TIMES DAILY
Qty: 10 ML | Refills: 5 | Status: SHIPPED | OUTPATIENT
Start: 2024-01-02

## 2024-01-12 ENCOUNTER — CLINICAL SUPPORT (OUTPATIENT)
Dept: OPHTHALMOLOGY | Facility: CLINIC | Age: 64
End: 2024-01-12
Payer: MEDICARE

## 2024-01-12 DIAGNOSIS — H40.1122 PRIMARY OPEN ANGLE GLAUCOMA (POAG) OF LEFT EYE, MODERATE STAGE: ICD-10-CM

## 2024-01-12 DIAGNOSIS — H40.1113 PRIMARY OPEN ANGLE GLAUCOMA (POAG) OF RIGHT EYE, SEVERE STAGE: Primary | ICD-10-CM

## 2024-01-12 NOTE — PROGRESS NOTES
HPI    HVF for Glaucoma  Last edited by Yane Ludwig RN on 1/12/2024  8:41 AM.            Assessment /Plan     For exam results, see Encounter Report.    Primary open angle glaucoma (POAG) of right eye, severe stage  -     Cochran Visual Field - OU - Extended - Both Eyes; Future    Primary open angle glaucoma (POAG) of left eye, moderate stage  -     Cochran Visual Field - OU - Extended - Both Eyes; Future      HVF for Glaucoma Completed

## 2024-01-17 DIAGNOSIS — H40.1122 PRIMARY OPEN ANGLE GLAUCOMA (POAG) OF LEFT EYE, MODERATE STAGE: Primary | ICD-10-CM

## 2024-01-17 DIAGNOSIS — H40.1113 PRIMARY OPEN ANGLE GLAUCOMA (POAG) OF RIGHT EYE, SEVERE STAGE: ICD-10-CM

## 2024-01-17 NOTE — PROGRESS NOTES
HPI    HVF for Glaucoma  Last edited by Yane Ludwig, RN on 1/12/2024  8:41 AM.        HPI    HVF for Glaucoma  Last edited by Yane Ludwig, RN on 1/12/2024  8:41 AM.            HPI    HVF for Glaucoma  Last edited by Yane Ludwig, RN on 1/12/2024  8:41 AM.      Assessment /Plan     For exam results, see Encounter Report.    Primary open angle glaucoma (POAG) of right eye, severe stage  -     Cochran Visual Field - OU - Extended - Both Eyes; Future    Primary open angle glaucoma (POAG) of left eye, moderate stage  -     Cochran Visual Field - OU - Extended - Both Eyes; Future          OCT RNFL   08/028/23: 49//77; red STI, green N // all green: stable  08/25/22: 50//76; all red // yel I    OCT Mac 8/3/22  - OD: 207, generalized retinal atrophy  - OS: 249, good foveal contour     HVF   8/3/22  - OD: 11/12 FL 38% FP 21% FN, unreliable dense SAD  - OS: 4/11 2% FP 0% FN, unreliable, nonspecific deficits     12/27/22  - OD: 7/13 FL 8% FP 8% FN, dense superior defect mostly respecting horizontal; inf NS  - OS: 3/11 3% FP 5% FN, scattered inf and nasal misses - not matching prior misses    1/12/2024  OD: 4/13 FL, Borderline with superior altitudinal defect with central scotoma  OS: 5/11 FL, Borderline with early inferior nasal step  Stable from 12/27/22    A/P:    1. POAG, Right Eye, Severe  2. POAG, Left Eye, Moderate  - + FH, thin pachy  - gonio 2/3/22 open 360 OU  - target pressure 13 // 13, unknown tmax  - OCT RNFL 08/21 45 all red // 76 yellow I, rest green. Increased thinning OS compared to 8/2021  - HVF stable w superior hemifield defect OD and scattered non-specific defects OS - **Hx of stroke in 2014 affecting right eye vision   - Continue xalantan QHS, cosopt BID, brimonidine TID  - Monocular precautions given  - OCT RNFL stable from prior  - At this time, will not proceed with SLT or xen gel OS. OD is comfort care. Patient denies any pain  12/27/22: new VF with stable findings OD (dens sup alt + early inf  NS), os with nonspecific misses not consistent with prior. Continue drops as above. IOP doing well  - 8/28/2023: IOP 13 OU, at goal, OCT RNFL stable Cont drops, if IOP elevated at next, may need to rediscuss SLT OS  1/12/2024:The patient presented for a HVF only.  The VF defects are stable.  No changes.     3. NSC OU  - NVS, CTM     4. DM II w/o Retinopathy  Hemoglobin A1c   Date Value Ref Range Status   10/13/2023 5.6 <=7.0 % Final   04/25/2023 5.4 <=7.0 % Final   11/01/2022 5.1 <=7.0 % Final   - encouraged good control/regular PCP f/u  - yearly DFE due 08/2023     5. PVD OU  - RD precautions  - monitor     6. Monocular status  - patient has polycarb glasses     RTC 2 months, IOP check

## 2024-01-18 RX ORDER — LATANOPROST 50 UG/ML
1 SOLUTION/ DROPS OPHTHALMIC NIGHTLY
Qty: 2.5 ML | Refills: 11 | Status: SHIPPED | OUTPATIENT
Start: 2024-01-18 | End: 2025-01-24

## 2024-01-31 RX ORDER — CLOPIDOGREL BISULFATE 75 MG/1
75 TABLET ORAL DAILY
Qty: 90 TABLET | Refills: 1 | Status: CANCELLED | OUTPATIENT
Start: 2024-01-31

## 2024-02-01 RX ORDER — CLOPIDOGREL BISULFATE 75 MG/1
75 TABLET ORAL DAILY
Qty: 90 TABLET | Refills: 1 | Status: SHIPPED | OUTPATIENT
Start: 2024-02-01 | End: 2024-03-13 | Stop reason: SDUPTHER

## 2024-03-06 RX ORDER — VENLAFAXINE HYDROCHLORIDE 75 MG/1
75 CAPSULE, EXTENDED RELEASE ORAL DAILY
Qty: 30 CAPSULE | Refills: 4 | Status: SHIPPED | OUTPATIENT
Start: 2024-03-06 | End: 2024-06-10 | Stop reason: SDUPTHER

## 2024-03-13 DIAGNOSIS — I10 HYPERTENSION, UNSPECIFIED TYPE: ICD-10-CM

## 2024-03-13 RX ORDER — LISINOPRIL 40 MG/1
40 TABLET ORAL DAILY
Qty: 90 TABLET | Refills: 2 | Status: SHIPPED | OUTPATIENT
Start: 2024-03-13

## 2024-03-13 RX ORDER — CLOPIDOGREL BISULFATE 75 MG/1
75 TABLET ORAL DAILY
Qty: 90 TABLET | Refills: 1 | Status: SHIPPED | OUTPATIENT
Start: 2024-03-13

## 2024-03-18 RX ORDER — ATORVASTATIN CALCIUM 20 MG/1
20 TABLET, FILM COATED ORAL DAILY
Qty: 90 TABLET | Refills: 1 | Status: SHIPPED | OUTPATIENT
Start: 2024-03-18

## 2024-03-26 ENCOUNTER — LAB VISIT (OUTPATIENT)
Dept: LAB | Facility: HOSPITAL | Age: 64
End: 2024-03-26
Attending: INTERNAL MEDICINE
Payer: MEDICARE

## 2024-03-26 ENCOUNTER — PATIENT MESSAGE (OUTPATIENT)
Dept: INTERNAL MEDICINE | Facility: CLINIC | Age: 64
End: 2024-03-26

## 2024-03-26 ENCOUNTER — OFFICE VISIT (OUTPATIENT)
Dept: INTERNAL MEDICINE | Facility: CLINIC | Age: 64
End: 2024-03-26
Payer: MEDICARE

## 2024-03-26 VITALS
RESPIRATION RATE: 16 BRPM | DIASTOLIC BLOOD PRESSURE: 81 MMHG | HEART RATE: 78 BPM | TEMPERATURE: 98 F | WEIGHT: 179.19 LBS | HEIGHT: 66 IN | OXYGEN SATURATION: 98 % | SYSTOLIC BLOOD PRESSURE: 124 MMHG | BODY MASS INDEX: 28.8 KG/M2

## 2024-03-26 DIAGNOSIS — N18.2 CHRONIC KIDNEY DISEASE, STAGE II (MILD): ICD-10-CM

## 2024-03-26 DIAGNOSIS — E11.22 TYPE 2 DIABETES MELLITUS WITH STAGE 2 CHRONIC KIDNEY DISEASE, WITHOUT LONG-TERM CURRENT USE OF INSULIN: ICD-10-CM

## 2024-03-26 DIAGNOSIS — M54.30 SCIATICA, UNSPECIFIED LATERALITY: ICD-10-CM

## 2024-03-26 DIAGNOSIS — N18.2 TYPE 2 DIABETES MELLITUS WITH STAGE 2 CHRONIC KIDNEY DISEASE, WITHOUT LONG-TERM CURRENT USE OF INSULIN: ICD-10-CM

## 2024-03-26 DIAGNOSIS — I10 HYPERTENSION, UNSPECIFIED TYPE: ICD-10-CM

## 2024-03-26 DIAGNOSIS — I10 HYPERTENSION, UNSPECIFIED TYPE: Primary | ICD-10-CM

## 2024-03-26 DIAGNOSIS — R56.9 SEIZURE: ICD-10-CM

## 2024-03-26 LAB
ALBUMIN SERPL-MCNC: 4.2 G/DL (ref 3.4–4.8)
ALBUMIN/GLOB SERPL: 0.9 RATIO (ref 1.1–2)
ALP SERPL-CCNC: 96 UNIT/L (ref 40–150)
ALT SERPL-CCNC: 15 UNIT/L (ref 0–55)
APPEARANCE UR: CLEAR
AST SERPL-CCNC: 17 UNIT/L (ref 5–34)
BACTERIA #/AREA URNS AUTO: ABNORMAL /HPF
BILIRUB SERPL-MCNC: 0.6 MG/DL
BILIRUB UR QL STRIP.AUTO: NEGATIVE
BUN SERPL-MCNC: 18.9 MG/DL (ref 9.8–20.1)
CALCIUM SERPL-MCNC: 10.6 MG/DL (ref 8.4–10.2)
CHLORIDE SERPL-SCNC: 105 MMOL/L (ref 98–107)
CHOLEST SERPL-MCNC: 195 MG/DL
CHOLEST/HDLC SERPL: 2 {RATIO} (ref 0–5)
CO2 SERPL-SCNC: 24 MMOL/L (ref 23–31)
COLOR UR AUTO: ABNORMAL
CREAT SERPL-MCNC: 1.14 MG/DL (ref 0.55–1.02)
CREAT UR-MCNC: 80.7 MG/DL (ref 45–106)
GFR SERPLBLD CREATININE-BSD FMLA CKD-EPI: 55 MLS/MIN/1.73/M2
GLOBULIN SER-MCNC: 4.5 GM/DL (ref 2.4–3.5)
GLUCOSE SERPL-MCNC: 110 MG/DL (ref 82–115)
GLUCOSE UR QL STRIP.AUTO: NORMAL
HBA1C MFR BLD: 5.9 %
HDLC SERPL-MCNC: 79 MG/DL (ref 35–60)
HYALINE CASTS #/AREA URNS LPF: ABNORMAL /LPF
KETONES UR QL STRIP.AUTO: NEGATIVE
LDLC SERPL CALC-MCNC: 102 MG/DL (ref 50–140)
LEUKOCYTE ESTERASE UR QL STRIP.AUTO: NEGATIVE
MICROALBUMIN UR-MCNC: 13.3 UG/ML
MICROALBUMIN/CREAT RATIO PNL UR: 16.5 MG/GM CR (ref 0–30)
MUCOUS THREADS URNS QL MICRO: ABNORMAL /LPF
NITRITE UR QL STRIP.AUTO: NEGATIVE
PH UR STRIP.AUTO: 6 [PH]
POTASSIUM SERPL-SCNC: 3.5 MMOL/L (ref 3.5–5.1)
PROT SERPL-MCNC: 8.7 GM/DL (ref 5.8–7.6)
PROT UR QL STRIP.AUTO: NEGATIVE
RBC #/AREA URNS AUTO: ABNORMAL /HPF
RBC UR QL AUTO: NEGATIVE
SODIUM SERPL-SCNC: 140 MMOL/L (ref 136–145)
SP GR UR STRIP.AUTO: 1.01 (ref 1–1.03)
SQUAMOUS #/AREA URNS LPF: ABNORMAL /HPF
TRIGL SERPL-MCNC: 72 MG/DL (ref 37–140)
UROBILINOGEN UR STRIP-ACNC: NORMAL
VLDLC SERPL CALC-MCNC: 14 MG/DL
WBC #/AREA URNS AUTO: ABNORMAL /HPF

## 2024-03-26 PROCEDURE — 80061 LIPID PANEL: CPT

## 2024-03-26 PROCEDURE — 80053 COMPREHEN METABOLIC PANEL: CPT

## 2024-03-26 PROCEDURE — 99215 OFFICE O/P EST HI 40 MIN: CPT | Mod: PBBFAC | Performed by: INTERNAL MEDICINE

## 2024-03-26 PROCEDURE — 81001 URINALYSIS AUTO W/SCOPE: CPT

## 2024-03-26 PROCEDURE — 36415 COLL VENOUS BLD VENIPUNCTURE: CPT

## 2024-03-26 PROCEDURE — 82043 UR ALBUMIN QUANTITATIVE: CPT

## 2024-03-26 PROCEDURE — 83036 HEMOGLOBIN GLYCOSYLATED A1C: CPT | Mod: PBBFAC | Performed by: INTERNAL MEDICINE

## 2024-03-26 RX ORDER — TIRZEPATIDE 2.5 MG/.5ML
2.5 INJECTION, SOLUTION SUBCUTANEOUS
Qty: 4 PEN | Refills: 0 | Status: SHIPPED | OUTPATIENT
Start: 2024-03-26 | End: 2024-04-22

## 2024-03-26 RX ORDER — METFORMIN HYDROCHLORIDE 500 MG/1
500 TABLET ORAL
Qty: 90 TABLET | Refills: 1 | Status: SHIPPED | OUTPATIENT
Start: 2024-03-26

## 2024-03-26 RX ORDER — ASPIRIN 81 MG/1
81 TABLET ORAL DAILY
Qty: 90 TABLET | Refills: 0 | Status: SHIPPED | OUTPATIENT
Start: 2024-03-26 | End: 2024-06-24

## 2024-03-26 RX ORDER — HYDROCHLOROTHIAZIDE 12.5 MG/1
12.5 TABLET ORAL DAILY
Qty: 90 TABLET | Refills: 1 | Status: SHIPPED | OUTPATIENT
Start: 2024-03-26 | End: 2024-05-20 | Stop reason: SDUPTHER

## 2024-03-26 RX ORDER — AMLODIPINE BESYLATE 10 MG/1
10 TABLET ORAL DAILY
Qty: 90 TABLET | Refills: 1 | Status: SHIPPED | OUTPATIENT
Start: 2024-03-26

## 2024-03-26 RX ORDER — LEVETIRACETAM 500 MG/1
500 TABLET ORAL 2 TIMES DAILY
Qty: 90 TABLET | Refills: 2 | Status: SHIPPED | OUTPATIENT
Start: 2024-03-26

## 2024-03-26 NOTE — PROGRESS NOTES
[facility] INTERNAL MEDICINE  OUTPATIENT OFFICE VISIT NOTE    SUBJECTIVE:    HPI: Alyssa Anderson is a 61 y.o. yo female non-insulin diabetes mellitus type II, and CVA.  Patient was previously seen by another provider at this clinic.  Last hemoglobin A1c 5.1, currently on Metformin 500 mg daily.  She currently follows Neurology due to history of stroke, currently on Plavix and aspirin. No new symptoms. Patient has residual symptoms: expressive aphasia.  She reports that she had seizures in the past and was placed on Keppra by Neurology. She states that she stopped taking the medication and started having seizures. She started the medication again and reports she has not had a seizure in many years.  Patient states that she fell in the parking lot. She states that right shoulder is still hurting and reports numbness.   Patient denies chest pain, palpitations, and shortness of breath.   Patient denies fever, night sweats, chills, nausea, vomiting, diarrhea, constipation, weight loss, and changes in appetite.    Review of Systems:  Constitutional: No fever, No chills, No sweats, No weakness, No fatigue, No decreased activity.   Eye: No recent visual problem, No icterus, No double vision.  Ear/Nose/Mouth/Throat: No nasal congestion, No sore throat.   Respiratory: No shortness of breath, No cough, No sputum production, No hemoptysis,   No wheezing, No cyanosis.   Cardiovascular: No chest pain, No palpitations, No peripheral edema, No syncope.   Gastrointestinal: No nausea, No vomiting, No diarrhea, No constipation, No hematemesis.   Genitourinary: No dysuria, No hematuria, No change in urine stream, No urethral discharge.   Hematology/Lymphatics: No bruising tendency, No bleeding tendency.   Endocrine: No polyuria, No cold intolerance, No heat intolerance.   Immunologic: Not immunocompromised, No recurrent fevers.   Musculoskeletal: No joint pain, No claudication.   Integumentary: No rash, No pruritus, No abrasions, No  "petechiae.    Neurologic: Alert and oriented X4, No abnormal balance, No numbness, No tingling.   Psychiatric: No anxiety, no depression, Not suicidal, Not delusional, No hallucinations.     OBJECTIVE:    Vitals:   /81   Pulse 78   Temp 98.1 °F (36.7 °C)   Resp 16   Ht 5' 6" (1.676 m)   Wt 81.3 kg (179 lb 3.2 oz)   SpO2 98%   BMI 28.92 kg/m²      Physical Exam:  General: Alert and oriented, No acute distress.   Eye: Pupils are equal, round and reactive to light, Extraocular movements are intact,   Normal conjunctiva, Vision unchanged.   HENT: Normocephalic, Normal hearing, Oral mucosa is moist.   Neck: Supple, No carotid bruit, No jugular venous distention, No thyromegaly.   Respiratory: Lungs are clear to auscultation, Respirations are non-labored, Breath sounds   are equal, Symmetrical chest wall expansion, No chest wall tenderness.   Cardiovascular: Normal rate, Regular rhythm, No murmur, No gallop, Good pulses equal in   All extremities, Normal peripheral perfusion, No edema.   Gastrointestinal: Soft, Non-tender, Non-distended, Normal bowel sounds.   Genitourinary: No costovertebral angle tenderness, No suprapubic tenderness.   Musculoskeletal: Normal range of motion, Normal strength, No swelling, No deformity, Normal gait.   Integumentary: Warm, No pallor, No rash.   Neurologic: Alert, Oriented, Normal sensory, Normal motor function, No focal deficits,   Cranial Nerves II-XII are grossly intact, Monofilament examination: No sensory loss on plantar   or dorsal surface of feet bilaterally.   Psychiatric: Cooperative, Appropriate mood & affect.     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 She reports " this injury affects her activities of daily living. Patient reports she can not bathe, sew, and can not carry heavy items. Sister does laundry for her at this time because she can not complete on her own.   Last hemoglobin A1C 5.5 continue Metformin 500 mg daily. Diabetic fundus photos no retinopathy, last appointment with Ophthalmology. Monofilament examination was normal 6/2023, next screening 6/2024. Lifestyle modifications. Begin Mounjaro 2.5 mg qweekly, side effects and history including family history was discussed, no history of pancreatitis and no family history of any thyroid cancers   US limited retroperitoneum- unremarkable renal ultrasound. Avoid nephrotoxic drugs. Referral to Nephrology completed.   Blood pressure 124/81. Continue medications Lisinopril 40 mg daily, Norvasc 10 mg daily, and Hydrochlorothiazide 12.5 mg daily. Patient reports systolic blood pressure 100-115.  Patient reports no new seizures. Continue Keppra. Patient is on 500 mg BID per last note. Continue to follow Neurology. Neurology has recommended to continue Plavix and Aspirin.  Vitamin D 21.4 Previously 13.1. Completed on Vitamin D weekly for 12 weeks. Recheck at next visit.   Controlled with Effexor, which she was started on by another provider.  Continue to follow up with Ophthalmology and continue eyedrops.    Discussed weight loss options, patient is overweight. Mounjaro will help with weight loss medication is being requested for diabetes mellitus type 2.       At next visit, will discuss referring to Nephrology.     Yvonne Adkins MD  [facility]

## 2024-04-15 ENCOUNTER — PATIENT MESSAGE (OUTPATIENT)
Dept: ADMINISTRATIVE | Facility: HOSPITAL | Age: 64
End: 2024-04-15
Payer: MEDICARE

## 2024-04-19 ENCOUNTER — PATIENT MESSAGE (OUTPATIENT)
Dept: INTERNAL MEDICINE | Facility: CLINIC | Age: 64
End: 2024-04-19
Payer: MEDICARE

## 2024-04-22 DIAGNOSIS — N18.2 TYPE 2 DIABETES MELLITUS WITH STAGE 2 CHRONIC KIDNEY DISEASE, WITHOUT LONG-TERM CURRENT USE OF INSULIN: ICD-10-CM

## 2024-04-22 DIAGNOSIS — E11.22 TYPE 2 DIABETES MELLITUS WITH STAGE 2 CHRONIC KIDNEY DISEASE, WITHOUT LONG-TERM CURRENT USE OF INSULIN: ICD-10-CM

## 2024-04-22 RX ORDER — TIRZEPATIDE 2.5 MG/.5ML
2.5 INJECTION, SOLUTION SUBCUTANEOUS
Qty: 4 PEN | Refills: 3 | OUTPATIENT
Start: 2024-04-22

## 2024-04-22 RX ORDER — TIRZEPATIDE 5 MG/.5ML
5 INJECTION, SOLUTION SUBCUTANEOUS
Qty: 4 PEN | Refills: 0 | Status: SHIPPED | OUTPATIENT
Start: 2024-04-22 | End: 2024-05-02

## 2024-04-22 NOTE — TELEPHONE ENCOUNTER
Pt identified using name and date of birth PT notified that Dr increased dosage to 5 mg and sent to [facility] pharmacy. She was also advised that if not in stock to call different pharmacies to find out who has it and will send RX there. Pt verbalizes understanding

## 2024-04-23 ENCOUNTER — PATIENT MESSAGE (OUTPATIENT)
Dept: ADMINISTRATIVE | Facility: HOSPITAL | Age: 64
End: 2024-04-23
Payer: MEDICARE

## 2024-04-23 ENCOUNTER — PATIENT MESSAGE (OUTPATIENT)
Dept: INTERNAL MEDICINE | Facility: CLINIC | Age: 64
End: 2024-04-23
Payer: MEDICARE

## 2024-04-23 ENCOUNTER — PATIENT OUTREACH (OUTPATIENT)
Dept: ADMINISTRATIVE | Facility: HOSPITAL | Age: 64
End: 2024-04-23
Payer: MEDICARE

## 2024-04-23 DIAGNOSIS — Z12.31 SCREENING MAMMOGRAM FOR BREAST CANCER: Primary | ICD-10-CM

## 2024-04-23 NOTE — PROGRESS NOTES
Health Maintenance Topic(s) Outreach Outcomes & Actions Taken:    Breast Cancer Screening - Outreach Outcomes & Actions Taken  : Mammogram Order Placed and portal message sent to notify patient of order.     Additional Notes:

## 2024-04-26 ENCOUNTER — PATIENT MESSAGE (OUTPATIENT)
Dept: INTERNAL MEDICINE | Facility: CLINIC | Age: 64
End: 2024-04-26
Payer: MEDICARE

## 2024-05-02 RX ORDER — TIRZEPATIDE 2.5 MG/.5ML
2.5 INJECTION, SOLUTION SUBCUTANEOUS
Qty: 4 PEN | Refills: 3 | Status: SHIPPED | OUTPATIENT
Start: 2024-05-02 | End: 2024-07-31

## 2024-05-02 NOTE — TELEPHONE ENCOUNTER
As previously discussed with the patient, Enoch may be hard to find. I sent the 2.5 mg dose to  pharmacy since I was told this is the dosage they have. She will have to stay on this dosage until the pharmacy has 5 mg. Then I will increase up to 5 mg again.

## 2024-05-18 ENCOUNTER — PATIENT MESSAGE (OUTPATIENT)
Dept: INTERNAL MEDICINE | Facility: CLINIC | Age: 64
End: 2024-05-18
Payer: MEDICARE

## 2024-05-20 DIAGNOSIS — I10 HYPERTENSION, UNSPECIFIED TYPE: ICD-10-CM

## 2024-05-22 RX ORDER — HYDROCHLOROTHIAZIDE 12.5 MG/1
12.5 TABLET ORAL DAILY
Qty: 90 TABLET | Refills: 1 | Status: SHIPPED | OUTPATIENT
Start: 2024-05-22

## 2024-05-24 DIAGNOSIS — E11.22 TYPE 2 DIABETES MELLITUS WITH STAGE 2 CHRONIC KIDNEY DISEASE, WITHOUT LONG-TERM CURRENT USE OF INSULIN: ICD-10-CM

## 2024-05-24 DIAGNOSIS — N18.2 TYPE 2 DIABETES MELLITUS WITH STAGE 2 CHRONIC KIDNEY DISEASE, WITHOUT LONG-TERM CURRENT USE OF INSULIN: ICD-10-CM

## 2024-05-27 RX ORDER — TIRZEPATIDE 2.5 MG/.5ML
2.5 INJECTION, SOLUTION SUBCUTANEOUS
Qty: 4 PEN | Refills: 3 | OUTPATIENT
Start: 2024-05-27 | End: 2024-08-25

## 2024-06-11 RX ORDER — VENLAFAXINE HYDROCHLORIDE 75 MG/1
75 CAPSULE, EXTENDED RELEASE ORAL DAILY
Qty: 30 CAPSULE | Refills: 4 | Status: SHIPPED | OUTPATIENT
Start: 2024-06-11

## 2024-06-13 RX ORDER — BRIMONIDINE TARTRATE 2 MG/ML
1 SOLUTION/ DROPS OPHTHALMIC 3 TIMES DAILY
Qty: 18 ML | Refills: 4 | OUTPATIENT
Start: 2024-06-13 | End: 2025-06-20

## 2024-06-13 RX ORDER — BRIMONIDINE TARTRATE 2 MG/ML
1 SOLUTION/ DROPS OPHTHALMIC 3 TIMES DAILY
Qty: 10 ML | Refills: 11 | Status: SHIPPED | OUTPATIENT
Start: 2024-06-13 | End: 2025-06-20

## 2024-06-13 RX ORDER — BRIMONIDINE TARTRATE 2 MG/ML
1 SOLUTION/ DROPS OPHTHALMIC 3 TIMES DAILY
Qty: 10 ML | Refills: 11 | Status: CANCELLED | OUTPATIENT
Start: 2024-06-13 | End: 2025-06-20

## 2024-06-14 ENCOUNTER — TELEPHONE (OUTPATIENT)
Dept: OPHTHALMOLOGY | Facility: CLINIC | Age: 64
End: 2024-06-14
Payer: MEDICARE

## 2024-06-14 NOTE — TELEPHONE ENCOUNTER
Called patient to inform her med for Brimonidine was sent to [facility] pharmacy, no answer, LM.

## 2024-06-18 ENCOUNTER — OFFICE VISIT (OUTPATIENT)
Dept: INTERNAL MEDICINE | Facility: CLINIC | Age: 64
End: 2024-06-18
Payer: MEDICARE

## 2024-06-18 VITALS
DIASTOLIC BLOOD PRESSURE: 65 MMHG | OXYGEN SATURATION: 98 % | SYSTOLIC BLOOD PRESSURE: 107 MMHG | BODY MASS INDEX: 27.54 KG/M2 | TEMPERATURE: 98 F | HEIGHT: 66 IN | RESPIRATION RATE: 16 BRPM | HEART RATE: 76 BPM | WEIGHT: 171.38 LBS

## 2024-06-18 DIAGNOSIS — N18.2 TYPE 2 DIABETES MELLITUS WITH STAGE 2 CHRONIC KIDNEY DISEASE, WITHOUT LONG-TERM CURRENT USE OF INSULIN: ICD-10-CM

## 2024-06-18 DIAGNOSIS — E78.5 HYPERLIPIDEMIA, UNSPECIFIED HYPERLIPIDEMIA TYPE: Primary | ICD-10-CM

## 2024-06-18 DIAGNOSIS — I10 HYPERTENSION, UNSPECIFIED TYPE: ICD-10-CM

## 2024-06-18 DIAGNOSIS — N18.30 STAGE 3 CHRONIC KIDNEY DISEASE, UNSPECIFIED WHETHER STAGE 3A OR 3B CKD: ICD-10-CM

## 2024-06-18 DIAGNOSIS — G40.909 SEIZURE DISORDER: ICD-10-CM

## 2024-06-18 DIAGNOSIS — E11.22 TYPE 2 DIABETES MELLITUS WITH STAGE 2 CHRONIC KIDNEY DISEASE, WITHOUT LONG-TERM CURRENT USE OF INSULIN: ICD-10-CM

## 2024-06-18 PROCEDURE — 99214 OFFICE O/P EST MOD 30 MIN: CPT | Mod: PBBFAC | Performed by: INTERNAL MEDICINE

## 2024-06-18 NOTE — PROGRESS NOTES
[facility] INTERNAL MEDICINE  OUTPATIENT OFFICE VISIT NOTE    SUBJECTIVE:    HPI: Alyssa Anderson is a 61 y.o. yo female non-insulin diabetes mellitus type II, and CVA.  Patient was previously seen by another provider at this clinic.  Last hemoglobin A1c 5.1, currently on Metformin 500 mg daily.  She currently follows Neurology due to history of stroke, currently on Plavix and aspirin. No new symptoms. Patient has residual symptoms: expressive aphasia.  She reports that she had seizures in the past and was placed on Keppra by Neurology. She states that she stopped taking the medication and started having seizures. She started the medication again and reports she has not had a seizure in many years.  At previous appointments, patient stated that she fell in the parking lot. She stated that right shoulder was still hurting and reported numbness.  Patient denies chest pain, palpitations, and shortness of breath.   Patient denies fever, night sweats, chills, nausea, vomiting, diarrhea, constipation, weight loss, and changes in appetite.    Review of Systems:  Constitutional: No fever, No chills, No sweats, No weakness, No fatigue, No decreased activity.   Eye: No recent visual problem, No icterus, No double vision.  Ear/Nose/Mouth/Throat: No nasal congestion, No sore throat.   Respiratory: No shortness of breath, No cough, No sputum production, No hemoptysis,   No wheezing, No cyanosis.   Cardiovascular: No chest pain, No palpitations, No peripheral edema, No syncope.   Gastrointestinal: No nausea, No vomiting, No diarrhea, No constipation, No hematemesis.   Genitourinary: No dysuria, No hematuria, No change in urine stream, No urethral discharge.   Hematology/Lymphatics: No bruising tendency, No bleeding tendency.   Endocrine: No polyuria, No cold intolerance, No heat intolerance.   Immunologic: Not immunocompromised, No recurrent fevers.   Musculoskeletal: No joint pain, No claudication.   Integumentary: No rash, No  "pruritus, No abrasions, No petechiae.    Neurologic: Alert and oriented X4, No abnormal balance, No numbness, No tingling.   Psychiatric: No anxiety, no depression, Not suicidal, Not delusional, No hallucinations.     OBJECTIVE:    Vitals:   /65 (BP Location: Left arm, Patient Position: Sitting, BP Method: Large (Automatic))   Pulse 76   Temp 98.3 °F (36.8 °C)   Resp 16   Ht 5' 6" (1.676 m)   Wt 77.7 kg (171 lb 6.4 oz)   SpO2 98%   BMI 27.66 kg/m²      Physical Exam:  General: Alert and oriented, No acute distress.   Eye: Pupils are equal, round and reactive to light, Extraocular movements are intact,   Normal conjunctiva, Vision unchanged.   HENT: Normocephalic, Normal hearing, Oral mucosa is moist.   Neck: Supple, No carotid bruit, No jugular venous distention, No thyromegaly.   Respiratory: Lungs are clear to auscultation, Respirations are non-labored, Breath sounds   are equal, Symmetrical chest wall expansion, No chest wall tenderness.   Cardiovascular: Normal rate, Regular rhythm, No murmur, No gallop, Good pulses equal in   All extremities, Normal peripheral perfusion, No edema.   Gastrointestinal: Soft, Non-tender, Non-distended, Normal bowel sounds.   Genitourinary: No costovertebral angle tenderness, No suprapubic tenderness.   Musculoskeletal: Normal range of motion, Normal strength, No swelling, No deformity, Normal gait.   Integumentary: Warm, No pallor, No rash.   Neurologic: Alert, Oriented, Normal sensory, Normal motor function, No focal deficits,   Cranial Nerves II-XII are grossly intact, Monofilament examination: No sensory loss on plantar   or dorsal surface of feet bilaterally.   Psychiatric: Cooperative, Appropriate mood & affect.     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 She reports this injury affects her activities of daily living. Patient reports she can not bathe, sew, and can not carry heavy items. Sister does laundry for her at this time because she can not complete on her own.   Last hemoglobin A1C 5.9 continue Metformin 500 mg daily. Diabetic fundus photos no retinopathy, last appointment with Ophthalmology. Monofilament examination was normal 6/2023, next screening 6/2024. Lifestyle modifications. Currently on Mounjaro 2.5 mg qweekly, side effects and history including family history was discussed, no history of pancreatitis and no family history of any thyroid cancers. Will increase to 5 mg qweekly.   US limited retroperitoneum- unremarkable renal ultrasound. Avoid nephrotoxic drugs. Referral to Nephrology completed.   Blood pressure 124/81. Continue medications Lisinopril 40 mg daily, Norvasc 10 mg daily, and Hydrochlorothiazide 12.5 mg daily. Patient reports systolic blood pressure 100-115.  Patient reports no new seizures. Continue Keppra. Patient is on 500 mg BID per last note. Continue to follow Neurology. Neurology has recommended to continue Plavix and Aspirin.  Vitamin D 21.4 Previously 13.1. Completed on Vitamin D weekly for 12 weeks. Recheck at next visit.   Controlled with Effexor, which she was started on by another provider.  Continue to follow up with Ophthalmology and continue eyedrops.    Mounjaro will help with weight loss medication is being requested for diabetes mellitus type 2.       At next visit, will discuss referring to Nephrology.     Yvonne Adkins MD  [facility]

## 2024-06-20 RX ORDER — TIRZEPATIDE 5 MG/.5ML
5 INJECTION, SOLUTION SUBCUTANEOUS
Qty: 4 PEN | Refills: 0 | Status: SHIPPED | OUTPATIENT
Start: 2024-06-20

## 2024-07-05 ENCOUNTER — HOSPITAL ENCOUNTER (OUTPATIENT)
Dept: RADIOLOGY | Facility: HOSPITAL | Age: 64
Discharge: HOME | End: 2024-07-05
Attending: INTERNAL MEDICINE
Payer: MEDICARE

## 2024-07-05 DIAGNOSIS — Z12.31 SCREENING MAMMOGRAM FOR BREAST CANCER: ICD-10-CM

## 2024-07-05 PROCEDURE — 77063 BREAST TOMOSYNTHESIS BI: CPT | Mod: 26,,, | Performed by: RADIOLOGY

## 2024-07-05 PROCEDURE — 77067 SCR MAMMO BI INCL CAD: CPT | Mod: TC

## 2024-07-05 PROCEDURE — 77067 SCR MAMMO BI INCL CAD: CPT | Mod: 26,,, | Performed by: RADIOLOGY

## 2024-07-15 ENCOUNTER — PATIENT MESSAGE (OUTPATIENT)
Dept: ADMINISTRATIVE | Facility: HOSPITAL | Age: 64
End: 2024-07-15
Payer: MEDICARE

## 2024-07-16 DIAGNOSIS — N18.2 TYPE 2 DIABETES MELLITUS WITH STAGE 2 CHRONIC KIDNEY DISEASE, WITHOUT LONG-TERM CURRENT USE OF INSULIN: ICD-10-CM

## 2024-07-16 DIAGNOSIS — E11.22 TYPE 2 DIABETES MELLITUS WITH STAGE 2 CHRONIC KIDNEY DISEASE, WITHOUT LONG-TERM CURRENT USE OF INSULIN: ICD-10-CM

## 2024-07-17 RX ORDER — BRIMONIDINE TARTRATE 2 MG/ML
1 SOLUTION/ DROPS OPHTHALMIC 3 TIMES DAILY
Qty: 10 ML | Refills: 11 | OUTPATIENT
Start: 2024-07-17 | End: 2025-07-24

## 2024-07-18 ENCOUNTER — PATIENT OUTREACH (OUTPATIENT)
Facility: CLINIC | Age: 64
End: 2024-07-18
Payer: MEDICARE

## 2024-07-18 RX ORDER — TIRZEPATIDE 5 MG/.5ML
5 INJECTION, SOLUTION SUBCUTANEOUS
Qty: 0.5 ML | Refills: 1 | Status: SHIPPED | OUTPATIENT
Start: 2024-07-18

## 2024-07-18 NOTE — PROGRESS NOTES
Health Maintenance Topic(s) Outreach Outcomes & Actions Taken:    Eye Exam - Outreach Outcomes & Actions Taken  : Pt Will Schedule with External Provider / Order Routed & Care Team Updated if Applicable       Additional Notes:

## 2024-08-11 DIAGNOSIS — N18.2 TYPE 2 DIABETES MELLITUS WITH STAGE 2 CHRONIC KIDNEY DISEASE, WITHOUT LONG-TERM CURRENT USE OF INSULIN: ICD-10-CM

## 2024-08-11 DIAGNOSIS — E11.22 TYPE 2 DIABETES MELLITUS WITH STAGE 2 CHRONIC KIDNEY DISEASE, WITHOUT LONG-TERM CURRENT USE OF INSULIN: ICD-10-CM

## 2024-08-11 DIAGNOSIS — I10 HYPERTENSION, UNSPECIFIED TYPE: ICD-10-CM

## 2024-08-13 RX ORDER — TIRZEPATIDE 5 MG/.5ML
5 INJECTION, SOLUTION SUBCUTANEOUS
Qty: 0.5 ML | Refills: 1 | Status: SHIPPED | OUTPATIENT
Start: 2024-08-13

## 2024-08-13 RX ORDER — HYDROCHLOROTHIAZIDE 12.5 MG/1
12.5 TABLET ORAL DAILY
Qty: 90 TABLET | Refills: 1 | Status: SHIPPED | OUTPATIENT
Start: 2024-08-13

## 2024-09-04 DIAGNOSIS — R56.9 SEIZURE: ICD-10-CM

## 2024-09-06 RX ORDER — LEVETIRACETAM 500 MG/1
500 TABLET ORAL 2 TIMES DAILY
Qty: 90 TABLET | Refills: 2 | Status: SHIPPED | OUTPATIENT
Start: 2024-09-06

## 2024-09-10 ENCOUNTER — OFFICE VISIT (OUTPATIENT)
Dept: INTERNAL MEDICINE | Facility: CLINIC | Age: 64
End: 2024-09-10
Payer: MEDICARE

## 2024-09-10 VITALS
HEART RATE: 61 BPM | BODY MASS INDEX: 25.49 KG/M2 | SYSTOLIC BLOOD PRESSURE: 102 MMHG | OXYGEN SATURATION: 100 % | WEIGHT: 158.63 LBS | TEMPERATURE: 99 F | RESPIRATION RATE: 16 BRPM | HEIGHT: 66 IN | DIASTOLIC BLOOD PRESSURE: 75 MMHG

## 2024-09-10 DIAGNOSIS — E11.22 TYPE 2 DIABETES MELLITUS WITH STAGE 2 CHRONIC KIDNEY DISEASE, WITHOUT LONG-TERM CURRENT USE OF INSULIN: ICD-10-CM

## 2024-09-10 DIAGNOSIS — N18.2 TYPE 2 DIABETES MELLITUS WITH STAGE 2 CHRONIC KIDNEY DISEASE, WITHOUT LONG-TERM CURRENT USE OF INSULIN: ICD-10-CM

## 2024-09-10 PROCEDURE — 99215 OFFICE O/P EST HI 40 MIN: CPT | Mod: PBBFAC | Performed by: INTERNAL MEDICINE

## 2024-09-10 NOTE — PROGRESS NOTES
[facility] INTERNAL MEDICINE  OUTPATIENT OFFICE VISIT NOTE    SUBJECTIVE:    HPI: Alyssa Anderson is a 61 y.o. yo female non-insulin diabetes mellitus type II, and CVA.  Patient was previously seen by another provider at this clinic.  Last hemoglobin A1c 5.9, currently on Metformin 500 mg daily.  Patient is tolerating Mounjaro, continue.   She currently follows Neurology due to history of stroke, currently on Plavix and aspirin. No new symptoms. Patient has residual symptoms: expressive aphasia.  She reports that she had seizures in the past and was placed on Keppra by Neurology. She states that she stopped taking the medication and started having seizures. She started the medication again and reports she has not had a seizure in many years.  At previous appointments, patient stated that she fell in the parking lot. She stated that right shoulder was still hurting and reported numbness.  Patient denies chest pain, palpitations, and shortness of breath.   Patient denies fever, night sweats, chills, nausea, vomiting, diarrhea, constipation, weight loss, and changes in appetite.    Review of Systems:  Constitutional: No fever, No chills, No sweats, No weakness, No fatigue, No decreased activity.   Eye: No recent visual problem, No icterus, No double vision.  Ear/Nose/Mouth/Throat: No nasal congestion, No sore throat.   Respiratory: No shortness of breath, No cough, No sputum production, No hemoptysis,   No wheezing, No cyanosis.   Cardiovascular: No chest pain, No palpitations, No peripheral edema, No syncope.   Gastrointestinal: No nausea, No vomiting, No diarrhea, No constipation, No hematemesis.   Genitourinary: No dysuria, No hematuria, No change in urine stream, No urethral discharge.   Hematology/Lymphatics: No bruising tendency, No bleeding tendency.   Endocrine: No polyuria, No cold intolerance, No heat intolerance.   Immunologic: Not immunocompromised, No recurrent fevers.   Musculoskeletal: No joint pain, No  "claudication.   Integumentary: No rash, No pruritus, No abrasions, No petechiae.    Neurologic: Alert and oriented X4, No abnormal balance, No numbness, No tingling.   Psychiatric: No anxiety, no depression, Not suicidal, Not delusional, No hallucinations.     OBJECTIVE:    Vitals:   /75 (BP Location: Left arm, Patient Position: Sitting, BP Method: Medium (Automatic))   Pulse 61   Temp 99.1 °F (37.3 °C) (Oral)   Resp 16   Ht 5' 6" (1.676 m)   Wt 71.9 kg (158 lb 9.6 oz)   SpO2 100%   BMI 25.60 kg/m²      Physical Exam:  General: Alert and oriented, No acute distress.   Eye: Pupils are equal, round and reactive to light, Extraocular movements are intact,   Normal conjunctiva, Vision unchanged.   HENT: Normocephalic, Normal hearing, Oral mucosa is moist.   Neck: Supple, No carotid bruit, No jugular venous distention, No thyromegaly.   Respiratory: Lungs are clear to auscultation, Respirations are non-labored, Breath sounds   are equal, Symmetrical chest wall expansion, No chest wall tenderness.   Cardiovascular: Normal rate, Regular rhythm, No murmur, No gallop, Good pulses equal in   All extremities, Normal peripheral perfusion, No edema.   Gastrointestinal: Soft, Non-tender, Non-distended, Normal bowel sounds.   Genitourinary: No costovertebral angle tenderness, No suprapubic tenderness.   Musculoskeletal: Normal range of motion, Normal strength, No swelling, No deformity, Normal gait.   Integumentary: Warm, No pallor, No rash.   Neurologic: Alert, Oriented, Normal sensory, Normal motor function, No focal deficits,   Cranial Nerves II-XII are grossly intact, Monofilament examination: No sensory loss on plantar   or dorsal surface of feet bilaterally.   Psychiatric: Cooperative, Appropriate mood & affect.     Significant findings:      ASSESSMENT & PLAN:  Shoulder fracture-right  Type II Diabetes Mellitus  Chronic Kidney Disease Stage III  Hypertension  Hyperlipidemia  CVA in Adulthood with expressive " aphasia  Seizure Disorder  Vitamin D deficiency  Depression  Glaucoma    Plan:  Completed physical therapy and states she will undergo imaging soon. Previously, reported this injury affects her activities of daily living. Patient reports she can not bathe, sew, and can not carry heavy items. Sister does laundry for her at this time because she can not complete on her own.   Last hemoglobin A1C 5.9 continue Metformin 500 mg daily. Diabetic fundus photos no retinopathy, last appointment with Ophthalmology. Monofilament examination was normal 6/2023, next screening 6/2024. Lifestyle modifications. Currently on Mounjaro 5 mg qweekly, side effects and history including family history was discussed, no history of pancreatitis and no family history of any thyroid cancers. Will increase to 7.5 mg qweekly.   US limited retroperitoneum- unremarkable renal ultrasound. Avoid nephrotoxic drugs. Referral to Nephrology completed.   Blood pressure 102/75. Continue medications Lisinopril 40 mg daily, Norvasc 10 mg daily, and Hydrochlorothiazide 12.5 mg daily. Patient reports systolic blood pressure 100-115.  Patient reports no new seizures. Continue Keppra. Patient is on 500 mg BID per last note. Continue to follow Neurology. Neurology has recommended to continue Plavix and Aspirin.  Vitamin D 21.4 Previously 13.1. Completed on Vitamin D weekly for 12 weeks. Recheck at next visit.   Controlled with Effexor, which she was started on by another provider.  Continue to follow up with Ophthalmology and continue eyedrops.    Mounjaro will help with weight loss medication is being requested for diabetes mellitus type 2.       At next visit, will discuss referring to Nephrology after CMP.     Yvonne Adkins MD  [facility]

## 2024-09-11 DIAGNOSIS — R56.9 SEIZURE: ICD-10-CM

## 2024-09-12 RX ORDER — LEVETIRACETAM 500 MG/1
500 TABLET ORAL 2 TIMES DAILY
Qty: 90 TABLET | Refills: 2 | OUTPATIENT
Start: 2024-09-12

## 2024-09-15 DIAGNOSIS — R56.9 SEIZURE: ICD-10-CM

## 2024-09-16 RX ORDER — LEVETIRACETAM 500 MG/1
500 TABLET ORAL 2 TIMES DAILY
Qty: 90 TABLET | Refills: 2 | OUTPATIENT
Start: 2024-09-16

## 2024-09-17 RX ORDER — BRIMONIDINE TARTRATE 2 MG/ML
1 SOLUTION/ DROPS OPHTHALMIC 3 TIMES DAILY
Qty: 10 ML | Refills: 11 | OUTPATIENT
Start: 2024-09-17 | End: 2025-09-24

## 2024-09-30 RX ORDER — BRIMONIDINE TARTRATE 2 MG/ML
1 SOLUTION/ DROPS OPHTHALMIC 3 TIMES DAILY
Qty: 10 ML | Refills: 11 | OUTPATIENT
Start: 2024-09-30 | End: 2025-10-07

## 2024-10-01 ENCOUNTER — OFFICE VISIT (OUTPATIENT)
Dept: OPHTHALMOLOGY | Facility: CLINIC | Age: 64
End: 2024-10-01
Payer: MEDICARE

## 2024-10-01 VITALS — HEIGHT: 66 IN | BODY MASS INDEX: 25.51 KG/M2 | WEIGHT: 158.75 LBS

## 2024-10-01 DIAGNOSIS — H52.203 MYOPIA OF BOTH EYES WITH ASTIGMATISM: ICD-10-CM

## 2024-10-01 DIAGNOSIS — H52.13 MYOPIA OF BOTH EYES WITH ASTIGMATISM: ICD-10-CM

## 2024-10-01 DIAGNOSIS — E11.22 TYPE 2 DIABETES MELLITUS WITH STAGE 2 CHRONIC KIDNEY DISEASE, WITHOUT LONG-TERM CURRENT USE OF INSULIN: ICD-10-CM

## 2024-10-01 DIAGNOSIS — N18.2 TYPE 2 DIABETES MELLITUS WITH STAGE 2 CHRONIC KIDNEY DISEASE, WITHOUT LONG-TERM CURRENT USE OF INSULIN: ICD-10-CM

## 2024-10-01 DIAGNOSIS — H40.1113 PRIMARY OPEN ANGLE GLAUCOMA (POAG) OF RIGHT EYE, SEVERE STAGE: Primary | ICD-10-CM

## 2024-10-01 DIAGNOSIS — H53.15 VISUAL DISTORTIONS OF SHAPE AND SIZE: ICD-10-CM

## 2024-10-01 PROCEDURE — 99213 OFFICE O/P EST LOW 20 MIN: CPT | Mod: PBBFAC,PN

## 2024-10-01 PROCEDURE — 92133 CPTRZD OPH DX IMG PST SGM ON: CPT | Mod: PBBFAC,PN | Performed by: OPHTHALMOLOGY

## 2024-10-01 PROCEDURE — 92133 CPTRZD OPH DX IMG PST SGM ON: CPT | Mod: PBBFAC,PN

## 2024-10-01 RX ORDER — PHENYLEPH/TROPICAMIDE IN WATER 2.5 %-1 %
1 DROPS OPHTHALMIC (EYE) ONCE
Status: COMPLETED | OUTPATIENT
Start: 2024-10-01 | End: 2024-10-01

## 2024-10-01 RX ADMIN — Medication 1 DROP: at 02:10

## 2024-10-01 NOTE — PROGRESS NOTES
HPI    RTC 2 months, IOP check     Has been out of brimonidine X 2 months  Needs refills sent to [facility], was being sent to [facility] and was not   aware.  Changed primary pharm to [facility].  Last edited by Yane Ludwig RN on 10/1/2024  2:37 PM.            Assessment /Plan     For exam results, see Encounter Report.    Primary open angle glaucoma (POAG) of right eye, severe stage  -     tropicamide /PHENYLephrine opthalmic solution 1 drop    Type 2 diabetes mellitus with stage 2 chronic kidney disease, without long-term current use of insulin  -     tropicamide /PHENYLephrine opthalmic solution 1 drop          OCT RNFL    10/1/24: 52//76: red STI//yellow I: stable  08/028/23: 49//77; red STI, green N // all green: stable  08/25/22: 50//76; all red // yel I    OCT Mac 10/1/24  - OD: 191, generalized retinal atrophy  - OS: 249, good foveal contour     HVF   8/3/22  - OD: 11/12 FL 38% FP 21% FN, unreliable dense SAD  - OS: 4/11 2% FP 0% FN, unreliable, nonspecific deficits     12/27/22  - OD: 7/13 FL 8% FP 8% FN, dense superior defect mostly respecting horizontal; inf NS  - OS: 3/11 3% FP 5% FN, scattered inf and nasal misses - not matching prior misses    1/12/2024  OD: 4/13 FL, Borderline with superior altitudinal defect with central scotoma  OS: 5/11 FL, Borderline with early inferior nasal step  Stable from 12/27/22    A/P:    1. POAG, Right Eye, Severe  2. POAG, Left Eye, Moderate  - + FH, thin pachy  - gonio 2/3/22 open 360 OU  - target pressure 13 // 13, unknown tmax  - OCT RNFL 08/21 45 all red // 76 yellow I, rest green. Increased thinning OS compared to 8/2021  - 1/12/24- HVF stable w superior hemifield defect OD and scattered non-specific defects OS - **Hx of stroke in 2014 affecting right eye vision   - Current drops: xalantan QHS, cosopt BID, brimonidine TID  - Monocular precautions given  - OCT RNFL 10/1/24 stable from prior  - At this time, will not proceed with SLT or xen gel OS.  Patient denies  any pain in OD and IOP is ok  12/27/22: new VF with stable findings OD (dens sup alt + early inf NS), os with nonspecific misses not consistent with prior. Continue drops as above. IOP doing well  - 8/28/2023: IOP 13 OU, at goal, OCT RNFL stable Cont drops, if IOP elevated at next, may need to rediscuss SLT OS  - 10/1/24: IOP elevated to 16//18. Out of brim for 2 months, using cosopt/xal. Oct RNFL stable from last few years. Will refill drops and recheck IOP in 2 months.   -      3. NSC OU  - NVS, CTM     4. DM II w/o Retinopathy  Lab Results   Component Value Date    HGBA1C 5.6 10/13/2023     - encouraged good control/regular PCP f/u  - optos photos taken 10/1/24  - yearly DFE due 10/2025     5. PVD OU  - RD precautions  - monitor     6. Monocular status  - Polycarb Mrx sent 10/1/24     RTC 2 months, IOP check

## 2024-10-06 DIAGNOSIS — N18.2 TYPE 2 DIABETES MELLITUS WITH STAGE 2 CHRONIC KIDNEY DISEASE, WITHOUT LONG-TERM CURRENT USE OF INSULIN: ICD-10-CM

## 2024-10-06 DIAGNOSIS — E11.22 TYPE 2 DIABETES MELLITUS WITH STAGE 2 CHRONIC KIDNEY DISEASE, WITHOUT LONG-TERM CURRENT USE OF INSULIN: ICD-10-CM

## 2024-10-21 RX ORDER — CLOPIDOGREL BISULFATE 75 MG/1
75 TABLET ORAL DAILY
Qty: 90 TABLET | Refills: 1 | Status: SHIPPED | OUTPATIENT
Start: 2024-10-21

## 2024-10-24 RX ORDER — VENLAFAXINE HYDROCHLORIDE 75 MG/1
75 CAPSULE, EXTENDED RELEASE ORAL DAILY
Qty: 30 CAPSULE | Refills: 4 | Status: SHIPPED | OUTPATIENT
Start: 2024-10-24

## 2024-11-07 RX ORDER — BRIMONIDINE TARTRATE 2 MG/ML
1 SOLUTION/ DROPS OPHTHALMIC 3 TIMES DAILY
Qty: 10 ML | Refills: 11 | Status: SHIPPED | OUTPATIENT
Start: 2024-11-07 | End: 2025-11-14

## 2024-11-18 DIAGNOSIS — E11.22 TYPE 2 DIABETES MELLITUS WITH STAGE 2 CHRONIC KIDNEY DISEASE, WITHOUT LONG-TERM CURRENT USE OF INSULIN: ICD-10-CM

## 2024-11-18 DIAGNOSIS — N18.2 TYPE 2 DIABETES MELLITUS WITH STAGE 2 CHRONIC KIDNEY DISEASE, WITHOUT LONG-TERM CURRENT USE OF INSULIN: ICD-10-CM

## 2024-11-19 RX ORDER — METFORMIN HYDROCHLORIDE 500 MG/1
500 TABLET ORAL
Qty: 90 TABLET | Refills: 1 | Status: SHIPPED | OUTPATIENT
Start: 2024-11-19

## 2024-11-26 ENCOUNTER — OFFICE VISIT (OUTPATIENT)
Dept: INTERNAL MEDICINE | Facility: CLINIC | Age: 64
End: 2024-11-26
Payer: MEDICARE

## 2024-11-26 VITALS
BODY MASS INDEX: 24.21 KG/M2 | HEIGHT: 66 IN | WEIGHT: 150.63 LBS | DIASTOLIC BLOOD PRESSURE: 67 MMHG | SYSTOLIC BLOOD PRESSURE: 96 MMHG | OXYGEN SATURATION: 100 % | TEMPERATURE: 99 F | RESPIRATION RATE: 18 BRPM | HEART RATE: 77 BPM

## 2024-11-26 DIAGNOSIS — N18.30 STAGE 3 CHRONIC KIDNEY DISEASE, UNSPECIFIED WHETHER STAGE 3A OR 3B CKD: ICD-10-CM

## 2024-11-26 DIAGNOSIS — E11.22 TYPE 2 DIABETES MELLITUS WITH STAGE 2 CHRONIC KIDNEY DISEASE, WITHOUT LONG-TERM CURRENT USE OF INSULIN: Primary | ICD-10-CM

## 2024-11-26 DIAGNOSIS — E78.5 HYPERLIPIDEMIA, UNSPECIFIED HYPERLIPIDEMIA TYPE: ICD-10-CM

## 2024-11-26 DIAGNOSIS — R56.9 SEIZURE: ICD-10-CM

## 2024-11-26 DIAGNOSIS — E11.22 TYPE 2 DIABETES MELLITUS WITH STAGE 2 CHRONIC KIDNEY DISEASE, WITHOUT LONG-TERM CURRENT USE OF INSULIN: ICD-10-CM

## 2024-11-26 DIAGNOSIS — N18.2 TYPE 2 DIABETES MELLITUS WITH STAGE 2 CHRONIC KIDNEY DISEASE, WITHOUT LONG-TERM CURRENT USE OF INSULIN: ICD-10-CM

## 2024-11-26 DIAGNOSIS — N18.2 TYPE 2 DIABETES MELLITUS WITH STAGE 2 CHRONIC KIDNEY DISEASE, WITHOUT LONG-TERM CURRENT USE OF INSULIN: Primary | ICD-10-CM

## 2024-11-26 LAB — HBA1C MFR BLD: 5.2 %

## 2024-11-26 PROCEDURE — 99215 OFFICE O/P EST HI 40 MIN: CPT | Mod: PBBFAC | Performed by: INTERNAL MEDICINE

## 2024-11-26 PROCEDURE — 83036 HEMOGLOBIN GLYCOSYLATED A1C: CPT | Mod: PBBFAC | Performed by: INTERNAL MEDICINE

## 2024-11-26 NOTE — PROGRESS NOTES
[facility] INTERNAL MEDICINE  OUTPATIENT OFFICE VISIT NOTE    SUBJECTIVE:    HPI: Alyssa Anderson is a 61 y.o. yo female non-insulin diabetes mellitus type II, and CVA.  Patient was previously seen by another provider at this clinic.  Last hemoglobin A1c 5.9, currently on Metformin 500 mg daily.  Patient is tolerating Mounjaro, continue.   She currently follows Neurology due to history of stroke, currently on Plavix and aspirin. No new symptoms. Patient has residual symptoms: expressive aphasia.  She reports that she had seizures in the past and was placed on Keppra by Neurology. She states that she stopped taking the medication and started having seizures. She started the medication again and reports she has not had a seizure in many years.  At previous appointments, patient stated that she fell in the parking lot. She stated that right shoulder was still hurting and reported numbness.  Patient denies chest pain, palpitations, and shortness of breath.   Patient denies fever, night sweats, chills, nausea, vomiting, diarrhea, constipation, weight loss, and changes in appetite.    Review of Systems:  Constitutional: No fever, No chills, No sweats, No weakness, No fatigue, No decreased activity.   Eye: No recent visual problem, No icterus, No double vision.  Ear/Nose/Mouth/Throat: No nasal congestion, No sore throat.   Respiratory: No shortness of breath, No cough, No sputum production, No hemoptysis,   No wheezing, No cyanosis.   Cardiovascular: No chest pain, No palpitations, No peripheral edema, No syncope.   Gastrointestinal: No nausea, No vomiting, No diarrhea, No constipation, No hematemesis.   Genitourinary: No dysuria, No hematuria, No change in urine stream, No urethral discharge.   Hematology/Lymphatics: No bruising tendency, No bleeding tendency.   Endocrine: No polyuria, No cold intolerance, No heat intolerance.   Immunologic: Not immunocompromised, No recurrent fevers.   Musculoskeletal: No joint pain, No  "claudication.   Integumentary: No rash, No pruritus, No abrasions, No petechiae.    Neurologic: Alert and oriented X4, No abnormal balance, No numbness, No tingling.   Psychiatric: No anxiety, no depression, Not suicidal, Not delusional, No hallucinations.     OBJECTIVE:    Vitals:   BP 96/67 (BP Location: Left arm, Patient Position: Sitting)   Pulse 77   Temp 99.1 °F (37.3 °C) (Oral)   Resp 18   Ht 5' 6" (1.676 m)   Wt 68.3 kg (150 lb 9.6 oz)   SpO2 100%   BMI 24.31 kg/m²      Physical Exam:  General: Alert and oriented, No acute distress.   Eye: Pupils are equal, round and reactive to light, Extraocular movements are intact,   Normal conjunctiva, Vision unchanged.   HENT: Normocephalic, Normal hearing, Oral mucosa is moist.   Neck: Supple, No carotid bruit, No jugular venous distention, No thyromegaly.   Respiratory: Lungs are clear to auscultation, Respirations are non-labored, Breath sounds   are equal, Symmetrical chest wall expansion, No chest wall tenderness.   Cardiovascular: Normal rate, Regular rhythm, No murmur, No gallop, Good pulses equal in   All extremities, Normal peripheral perfusion, No edema.   Gastrointestinal: Soft, Non-tender, Non-distended, Normal bowel sounds.   Genitourinary: No costovertebral angle tenderness, No suprapubic tenderness.   Musculoskeletal: Normal range of motion, Normal strength, No swelling, No deformity, Normal gait.   Integumentary: Warm, No pallor, No rash.   Neurologic: Alert, Oriented, Normal sensory, Normal motor function, No focal deficits,   Cranial Nerves II-XII are grossly intact, Monofilament examination: No sensory loss on plantar   or dorsal surface of feet bilaterally.   Psychiatric: Cooperative, Appropriate mood & affect.     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 Previously, reported this injury affects her activities of daily living. Patient reports she can not bathe, sew, and can not carry heavy items. Sister does laundry for her at this time because she can not complete on her own.   Last hemoglobin A1C 5.9 continue Metformin 500 mg daily. Diabetic fundus photos no retinopathy, last appointment with Ophthalmology. Monofilament examination was normal 6/2023, next screening 6/2024. Lifestyle modifications. Currently on Mounjaro 5 mg qweekly, side effects and history including family history was discussed, no history of pancreatitis and no family history of any thyroid cancers. Will increase to 7.5 mg qweekly.   US limited retroperitoneum- unremarkable renal ultrasound. Avoid nephrotoxic drugs. Referral to Nephrology completed.   Blood pressure 102/75. Continue medications Lisinopril 40 mg daily, Norvasc 10 mg daily, and Hydrochlorothiazide 12.5 mg daily. Patient reports systolic blood pressure 100-115.  Patient reports no new seizures. Continue Keppra. Patient is on 500 mg BID per last note. Continue to follow Neurology. Neurology has recommended to continue Plavix and Aspirin.  Vitamin D 21.4 Previously 13.1. Completed on Vitamin D weekly for 12 weeks. Recheck at next visit.   Controlled with Effexor, which she was started on by another provider.  Continue to follow up with Ophthalmology and continue eyedrops.    Mounjaro will help with weight loss medication is being requested for diabetes mellitus type 2.       At next visit, will discuss referring to Nephrology after CMP.     Yvonne Adkins MD  [facility]

## 2024-11-29 RX ORDER — LEVETIRACETAM 500 MG/1
500 TABLET ORAL 2 TIMES DAILY
Qty: 90 TABLET | Refills: 2 | Status: SHIPPED | OUTPATIENT
Start: 2024-11-29

## 2024-12-02 DIAGNOSIS — R56.9 SEIZURE: ICD-10-CM

## 2024-12-03 ENCOUNTER — OFFICE VISIT (OUTPATIENT)
Dept: OPHTHALMOLOGY | Facility: CLINIC | Age: 64
End: 2024-12-03
Payer: MEDICARE

## 2024-12-03 VITALS — WEIGHT: 145 LBS | HEIGHT: 67 IN | BODY MASS INDEX: 22.76 KG/M2

## 2024-12-03 DIAGNOSIS — H52.203 MYOPIA OF BOTH EYES WITH ASTIGMATISM: ICD-10-CM

## 2024-12-03 DIAGNOSIS — H40.1122 PRIMARY OPEN ANGLE GLAUCOMA (POAG) OF LEFT EYE, MODERATE STAGE: ICD-10-CM

## 2024-12-03 DIAGNOSIS — N18.2 TYPE 2 DIABETES MELLITUS WITH STAGE 2 CHRONIC KIDNEY DISEASE, WITHOUT LONG-TERM CURRENT USE OF INSULIN: ICD-10-CM

## 2024-12-03 DIAGNOSIS — H52.13 MYOPIA OF BOTH EYES WITH ASTIGMATISM: ICD-10-CM

## 2024-12-03 DIAGNOSIS — H25.13 AGE-RELATED NUCLEAR CATARACT OF BOTH EYES: ICD-10-CM

## 2024-12-03 DIAGNOSIS — H40.1113 PRIMARY OPEN ANGLE GLAUCOMA (POAG) OF RIGHT EYE, SEVERE STAGE: Primary | ICD-10-CM

## 2024-12-03 DIAGNOSIS — E11.22 TYPE 2 DIABETES MELLITUS WITH STAGE 2 CHRONIC KIDNEY DISEASE, WITHOUT LONG-TERM CURRENT USE OF INSULIN: ICD-10-CM

## 2024-12-03 PROCEDURE — 99213 OFFICE O/P EST LOW 20 MIN: CPT | Mod: PBBFAC,PN | Performed by: STUDENT IN AN ORGANIZED HEALTH CARE EDUCATION/TRAINING PROGRAM

## 2024-12-03 NOTE — PROGRESS NOTES
HPI    2 months, IOP check.  OU doing well, no new complaints. Using drops as directed, no refills   needed at this time.   Last edited by Aline Elena LPN on 12/3/2024  2:55 PM.            Assessment /Plan     For exam results, see Encounter Report.    Primary open angle glaucoma (POAG) of right eye, severe stage    Primary open angle glaucoma (POAG) of left eye, moderate stage    Type 2 diabetes mellitus with stage 2 chronic kidney disease, without long-term current use of insulin    Myopia of both eyes with astigmatism    Age-related nuclear cataract of both eyes          OCT RNFL   10/1/24: 52//76: red STI//yellow I: stable  08/028/23: 49//77; red STI, green N // all green: stable  08/25/22: 50//76; all red // yel I    OCT Mac 10/1/24  - OD: 191, generalized retinal atrophy  - OS: 249, good foveal contour     HVF   8/3/22  - OD: 11/12 FL 38% FP 21% FN, unreliable dense SAD  - OS: 4/11 2% FP 0% FN, unreliable, nonspecific deficits     12/27/22  - OD: 7/13 FL 8% FP 8% FN, dense superior defect mostly respecting horizontal; inf NS  - OS: 3/11 3% FP 5% FN, scattered inf and nasal misses - not matching prior misses    1/12/2024  OD: 4/13 FL, Borderline with superior altitudinal defect with central scotoma  OS: 5/11 FL, Borderline with early inferior nasal step  Stable from 12/27/22    A/P:    1. POAG, Right Eye, Severe  2. POAG, Left Eye, Moderate  - + FH, thin pachy  - gonio 2/3/22 open 360 OU  - target pressure 13 // 13, unknown tmax  - OCT RNFL 08/21 45 all red // 76 yellow I, rest green. Increased thinning OS compared to 8/2021  - 1/12/24- HVF stable w superior hemifield defect OD and scattered non-specific defects OS - **Hx of stroke in 2014 affecting right eye vision   - Current drops: xalantan QHS, cosopt BID, brimonidine TID  - Monocular precautions given  - OCT RNFL 10/1/24 stable from prior  - At this time, will not proceed with SLT or xen gel OS.  Patient denies any pain in OD and IOP is  ok  12/27/22: new VF with stable findings OD (dens sup alt + early inf NS), os with nonspecific misses not consistent with prior. Continue drops as above. IOP doing well  - 8/28/2023: IOP 13 OU, at goal, OCT RNFL stable Cont drops, if IOP elevated at next, may need to rediscuss SLT OS  - 10/1/24: IOP elevated to 16//18. Out of brim for 2 months, using cosopt/xal. Oct RNFL stable from last few years. Will refill drops and recheck IOP in 2 months.   - 12/3/24: Patient here for IOP check after running out of drops last visit. IOP at goal today. Doing well. Continue current management.      3. NSC OU  - NVS, CTM     4. DM II w/o Retinopathy  Lab Results   Component Value Date    HGBA1C 5.6 10/13/2023     - encouraged good control/regular PCP f/u  - optos photos taken 10/1/24  - yearly DFE due 10/2025     5. PVD OU  - RD precautions  - monitor     6. Monocular status  - Polycarb Mrx sent 10/1/24     RTC 3-4 months for HVF 24-2 and IOP check

## 2024-12-04 RX ORDER — LEVETIRACETAM 500 MG/1
500 TABLET ORAL 2 TIMES DAILY
Qty: 90 TABLET | Refills: 2 | Status: SHIPPED | OUTPATIENT
Start: 2024-12-04

## 2024-12-04 RX ORDER — ATORVASTATIN CALCIUM 20 MG/1
20 TABLET, FILM COATED ORAL DAILY
Qty: 90 TABLET | Refills: 1 | Status: SHIPPED | OUTPATIENT
Start: 2024-12-04

## 2024-12-22 DIAGNOSIS — R56.9 SEIZURE: ICD-10-CM

## 2024-12-22 DIAGNOSIS — E11.22 TYPE 2 DIABETES MELLITUS WITH STAGE 2 CHRONIC KIDNEY DISEASE, WITHOUT LONG-TERM CURRENT USE OF INSULIN: ICD-10-CM

## 2024-12-22 DIAGNOSIS — N18.2 TYPE 2 DIABETES MELLITUS WITH STAGE 2 CHRONIC KIDNEY DISEASE, WITHOUT LONG-TERM CURRENT USE OF INSULIN: ICD-10-CM

## 2024-12-22 RX ORDER — LEVETIRACETAM 500 MG/1
500 TABLET ORAL 2 TIMES DAILY
Qty: 90 TABLET | Refills: 2 | Status: CANCELLED | OUTPATIENT
Start: 2024-12-22

## 2024-12-26 DIAGNOSIS — R56.9 SEIZURE: ICD-10-CM

## 2024-12-27 DIAGNOSIS — N18.2 TYPE 2 DIABETES MELLITUS WITH STAGE 2 CHRONIC KIDNEY DISEASE, WITHOUT LONG-TERM CURRENT USE OF INSULIN: ICD-10-CM

## 2024-12-27 DIAGNOSIS — E11.22 TYPE 2 DIABETES MELLITUS WITH STAGE 2 CHRONIC KIDNEY DISEASE, WITHOUT LONG-TERM CURRENT USE OF INSULIN: ICD-10-CM

## 2024-12-27 RX ORDER — LEVETIRACETAM 500 MG/1
500 TABLET ORAL 2 TIMES DAILY
Qty: 90 TABLET | Refills: 2 | OUTPATIENT
Start: 2024-12-27

## 2024-12-27 NOTE — TELEPHONE ENCOUNTER
Rx was sent to [facility]. They responded that this Rx can not be filled at their location. They request it be sent to a different pharmacy.

## 2025-01-15 DIAGNOSIS — I10 HYPERTENSION, UNSPECIFIED TYPE: ICD-10-CM

## 2025-01-15 RX ORDER — AMLODIPINE BESYLATE 10 MG/1
10 TABLET ORAL DAILY
Qty: 90 TABLET | Refills: 1 | Status: SHIPPED | OUTPATIENT
Start: 2025-01-15

## 2025-01-24 DIAGNOSIS — E11.22 TYPE 2 DIABETES MELLITUS WITH STAGE 2 CHRONIC KIDNEY DISEASE, WITHOUT LONG-TERM CURRENT USE OF INSULIN: ICD-10-CM

## 2025-01-24 DIAGNOSIS — N18.2 TYPE 2 DIABETES MELLITUS WITH STAGE 2 CHRONIC KIDNEY DISEASE, WITHOUT LONG-TERM CURRENT USE OF INSULIN: ICD-10-CM

## 2025-02-03 DIAGNOSIS — I10 HYPERTENSION, UNSPECIFIED TYPE: ICD-10-CM

## 2025-02-03 RX ORDER — LISINOPRIL 40 MG/1
40 TABLET ORAL DAILY
Qty: 90 TABLET | Refills: 2 | Status: SHIPPED | OUTPATIENT
Start: 2025-02-03

## 2025-02-17 ENCOUNTER — LAB VISIT (OUTPATIENT)
Dept: LAB | Facility: HOSPITAL | Age: 65
End: 2025-02-17
Attending: INTERNAL MEDICINE
Payer: MEDICARE

## 2025-02-17 ENCOUNTER — TELEPHONE (OUTPATIENT)
Dept: OPHTHALMOLOGY | Facility: CLINIC | Age: 65
End: 2025-02-17
Payer: MEDICARE

## 2025-02-17 DIAGNOSIS — R56.9 SEIZURE: ICD-10-CM

## 2025-02-17 DIAGNOSIS — E78.5 HYPERLIPIDEMIA, UNSPECIFIED HYPERLIPIDEMIA TYPE: ICD-10-CM

## 2025-02-17 DIAGNOSIS — N18.2 TYPE 2 DIABETES MELLITUS WITH STAGE 2 CHRONIC KIDNEY DISEASE, WITHOUT LONG-TERM CURRENT USE OF INSULIN: ICD-10-CM

## 2025-02-17 DIAGNOSIS — N18.30 STAGE 3 CHRONIC KIDNEY DISEASE, UNSPECIFIED WHETHER STAGE 3A OR 3B CKD: ICD-10-CM

## 2025-02-17 DIAGNOSIS — H40.1113 PRIMARY OPEN ANGLE GLAUCOMA (POAG) OF RIGHT EYE, SEVERE STAGE: ICD-10-CM

## 2025-02-17 DIAGNOSIS — E11.22 TYPE 2 DIABETES MELLITUS WITH STAGE 2 CHRONIC KIDNEY DISEASE, WITHOUT LONG-TERM CURRENT USE OF INSULIN: ICD-10-CM

## 2025-02-17 DIAGNOSIS — H40.1122 PRIMARY OPEN ANGLE GLAUCOMA (POAG) OF LEFT EYE, MODERATE STAGE: ICD-10-CM

## 2025-02-17 LAB
ALBUMIN SERPL-MCNC: 3.8 G/DL (ref 3.4–4.8)
ALBUMIN/GLOB SERPL: 0.9 RATIO (ref 1.1–2)
ALP SERPL-CCNC: 71 UNIT/L (ref 40–150)
ALT SERPL-CCNC: 7 UNIT/L (ref 0–55)
ANION GAP SERPL CALC-SCNC: 10 MEQ/L
AST SERPL-CCNC: 12 UNIT/L (ref 5–34)
BASOPHILS # BLD AUTO: 0.09 X10(3)/MCL
BASOPHILS NFR BLD AUTO: 1.7 %
BILIRUB SERPL-MCNC: 0.7 MG/DL
BUN SERPL-MCNC: 21.6 MG/DL (ref 9.8–20.1)
CALCIUM SERPL-MCNC: 10.1 MG/DL (ref 8.4–10.2)
CHLORIDE SERPL-SCNC: 102 MMOL/L (ref 98–107)
CHOLEST SERPL-MCNC: 153 MG/DL
CHOLEST/HDLC SERPL: 3 {RATIO} (ref 0–5)
CO2 SERPL-SCNC: 26 MMOL/L (ref 23–31)
CREAT SERPL-MCNC: 1.42 MG/DL (ref 0.55–1.02)
CREAT/UREA NIT SERPL: 15
EOSINOPHIL # BLD AUTO: 0.05 X10(3)/MCL (ref 0–0.9)
EOSINOPHIL NFR BLD AUTO: 0.9 %
ERYTHROCYTE [DISTWIDTH] IN BLOOD BY AUTOMATED COUNT: 12.6 % (ref 11.5–17)
EST. AVERAGE GLUCOSE BLD GHB EST-MCNC: 96.8 MG/DL
GFR SERPLBLD CREATININE-BSD FMLA CKD-EPI: 42 ML/MIN/1.73/M2
GLOBULIN SER-MCNC: 4.4 GM/DL (ref 2.4–3.5)
GLUCOSE SERPL-MCNC: 95 MG/DL (ref 82–115)
HBA1C MFR BLD: 5 %
HCT VFR BLD AUTO: 35.6 % (ref 37–47)
HDLC SERPL-MCNC: 47 MG/DL (ref 35–60)
HGB BLD-MCNC: 11.8 G/DL (ref 12–16)
IMM GRANULOCYTES # BLD AUTO: 0.02 X10(3)/MCL (ref 0–0.04)
IMM GRANULOCYTES NFR BLD AUTO: 0.4 %
LDLC SERPL CALC-MCNC: 90 MG/DL (ref 50–140)
LYMPHOCYTES # BLD AUTO: 2.2 X10(3)/MCL (ref 0.6–4.6)
LYMPHOCYTES NFR BLD AUTO: 40.9 %
MCH RBC QN AUTO: 30.3 PG (ref 27–31)
MCHC RBC AUTO-ENTMCNC: 33.1 G/DL (ref 33–36)
MCV RBC AUTO: 91.3 FL (ref 80–94)
MONOCYTES # BLD AUTO: 0.42 X10(3)/MCL (ref 0.1–1.3)
MONOCYTES NFR BLD AUTO: 7.8 %
NEUTROPHILS # BLD AUTO: 2.6 X10(3)/MCL (ref 2.1–9.2)
NEUTROPHILS NFR BLD AUTO: 48.3 %
NRBC BLD AUTO-RTO: 0 %
PLATELET # BLD AUTO: 341 X10(3)/MCL (ref 130–400)
PMV BLD AUTO: 10.9 FL (ref 7.4–10.4)
POTASSIUM SERPL-SCNC: 3.2 MMOL/L (ref 3.5–5.1)
PROT SERPL-MCNC: 8.2 GM/DL (ref 5.8–7.6)
RBC # BLD AUTO: 3.9 X10(6)/MCL (ref 4.2–5.4)
SODIUM SERPL-SCNC: 138 MMOL/L (ref 136–145)
TRIGL SERPL-MCNC: 82 MG/DL (ref 37–140)
VLDLC SERPL CALC-MCNC: 16 MG/DL
WBC # BLD AUTO: 5.38 X10(3)/MCL (ref 4.5–11.5)

## 2025-02-17 PROCEDURE — 80053 COMPREHEN METABOLIC PANEL: CPT

## 2025-02-17 PROCEDURE — 80061 LIPID PANEL: CPT

## 2025-02-17 PROCEDURE — 36415 COLL VENOUS BLD VENIPUNCTURE: CPT

## 2025-02-17 PROCEDURE — 83036 HEMOGLOBIN GLYCOSYLATED A1C: CPT

## 2025-02-17 PROCEDURE — 85025 COMPLETE CBC W/AUTO DIFF WBC: CPT

## 2025-02-17 RX ORDER — DORZOLAMIDE HYDROCHLORIDE AND TIMOLOL MALEATE 20; 5 MG/ML; MG/ML
1 SOLUTION/ DROPS OPHTHALMIC 2 TIMES DAILY
Qty: 10 ML | Refills: 11 | Status: SHIPPED | OUTPATIENT
Start: 2025-02-17

## 2025-02-17 RX ORDER — BRIMONIDINE TARTRATE 2 MG/ML
1 SOLUTION/ DROPS OPHTHALMIC 3 TIMES DAILY
Qty: 10 ML | Refills: 11 | Status: SHIPPED | OUTPATIENT
Start: 2025-02-17 | End: 2026-02-24

## 2025-02-17 RX ORDER — LATANOPROST 50 UG/ML
1 SOLUTION/ DROPS OPHTHALMIC NIGHTLY
Qty: 2.5 ML | Refills: 11 | Status: SHIPPED | OUTPATIENT
Start: 2025-02-17 | End: 2026-02-24

## 2025-02-18 ENCOUNTER — OFFICE VISIT (OUTPATIENT)
Dept: INTERNAL MEDICINE | Facility: CLINIC | Age: 65
End: 2025-02-18
Payer: MEDICARE

## 2025-02-18 ENCOUNTER — LAB VISIT (OUTPATIENT)
Dept: LAB | Facility: HOSPITAL | Age: 65
End: 2025-02-18
Attending: GENERAL PRACTICE
Payer: MEDICARE

## 2025-02-18 VITALS
HEIGHT: 67 IN | TEMPERATURE: 98 F | HEART RATE: 73 BPM | BODY MASS INDEX: 22.42 KG/M2 | SYSTOLIC BLOOD PRESSURE: 93 MMHG | WEIGHT: 142.81 LBS | OXYGEN SATURATION: 100 % | RESPIRATION RATE: 18 BRPM | DIASTOLIC BLOOD PRESSURE: 62 MMHG

## 2025-02-18 DIAGNOSIS — N18.30 STAGE 3 CHRONIC KIDNEY DISEASE, UNSPECIFIED WHETHER STAGE 3A OR 3B CKD: ICD-10-CM

## 2025-02-18 DIAGNOSIS — E87.6 HYPOKALEMIA: ICD-10-CM

## 2025-02-18 DIAGNOSIS — E11.22 TYPE 2 DIABETES MELLITUS WITH STAGE 2 CHRONIC KIDNEY DISEASE, WITHOUT LONG-TERM CURRENT USE OF INSULIN: ICD-10-CM

## 2025-02-18 DIAGNOSIS — N18.30 STAGE 3 CHRONIC KIDNEY DISEASE, UNSPECIFIED WHETHER STAGE 3A OR 3B CKD: Primary | ICD-10-CM

## 2025-02-18 DIAGNOSIS — N18.2 TYPE 2 DIABETES MELLITUS WITH STAGE 2 CHRONIC KIDNEY DISEASE, WITHOUT LONG-TERM CURRENT USE OF INSULIN: ICD-10-CM

## 2025-02-18 LAB
ALBUMIN SERPL-MCNC: 3.8 G/DL (ref 3.4–4.8)
ALBUMIN/GLOB SERPL: 0.9 RATIO (ref 1.1–2)
ALP SERPL-CCNC: 72 UNIT/L (ref 40–150)
ALT SERPL-CCNC: 6 UNIT/L (ref 0–55)
ANION GAP SERPL CALC-SCNC: 9 MEQ/L
AST SERPL-CCNC: 12 UNIT/L (ref 5–34)
BACTERIA #/AREA URNS AUTO: ABNORMAL /HPF
BILIRUB SERPL-MCNC: 0.6 MG/DL
BILIRUB UR QL STRIP.AUTO: NEGATIVE
BUN SERPL-MCNC: 28.4 MG/DL (ref 9.8–20.1)
CALCIUM SERPL-MCNC: 9.9 MG/DL (ref 8.4–10.2)
CHLORIDE SERPL-SCNC: 100 MMOL/L (ref 98–107)
CLARITY UR: ABNORMAL
CO2 SERPL-SCNC: 26 MMOL/L (ref 23–31)
COLOR UR AUTO: ABNORMAL
CREAT SERPL-MCNC: 1.66 MG/DL (ref 0.55–1.02)
CREAT UR-MCNC: 93.5 MG/DL (ref 45–106)
CREAT/UREA NIT SERPL: 17
GFR SERPLBLD CREATININE-BSD FMLA CKD-EPI: 35 ML/MIN/1.73/M2
GLOBULIN SER-MCNC: 4.3 GM/DL (ref 2.4–3.5)
GLUCOSE SERPL-MCNC: 93 MG/DL (ref 82–115)
GLUCOSE UR QL STRIP: NORMAL
HGB UR QL STRIP: NEGATIVE
HYALINE CASTS #/AREA URNS LPF: ABNORMAL /LPF
KETONES UR QL STRIP: ABNORMAL
LEUKOCYTE ESTERASE UR QL STRIP: 75
MICROALBUMIN UR-MCNC: 22.1 UG/ML
MICROALBUMIN/CREAT RATIO PNL UR: 23.6 MG/GM CR (ref 0–30)
MUCOUS THREADS URNS QL MICRO: ABNORMAL /LPF
NITRITE UR QL STRIP: NEGATIVE
PH UR STRIP: 5.5 [PH]
POTASSIUM SERPL-SCNC: 3.2 MMOL/L (ref 3.5–5.1)
PROT SERPL-MCNC: 8.1 GM/DL (ref 5.8–7.6)
PROT UR QL STRIP: NEGATIVE
RBC #/AREA URNS AUTO: ABNORMAL /HPF
SODIUM SERPL-SCNC: 135 MMOL/L (ref 136–145)
SP GR UR STRIP.AUTO: 1.01 (ref 1–1.03)
SQUAMOUS #/AREA URNS LPF: ABNORMAL /HPF
UROBILINOGEN UR STRIP-ACNC: NORMAL
WBC #/AREA URNS AUTO: ABNORMAL /HPF

## 2025-02-18 PROCEDURE — 99215 OFFICE O/P EST HI 40 MIN: CPT | Mod: PBBFAC | Performed by: INTERNAL MEDICINE

## 2025-02-18 PROCEDURE — 80053 COMPREHEN METABOLIC PANEL: CPT

## 2025-02-18 PROCEDURE — 36415 COLL VENOUS BLD VENIPUNCTURE: CPT

## 2025-02-18 PROCEDURE — 81001 URINALYSIS AUTO W/SCOPE: CPT

## 2025-02-18 PROCEDURE — 82570 ASSAY OF URINE CREATININE: CPT

## 2025-02-18 RX ORDER — POTASSIUM CHLORIDE 1500 MG/1
40 TABLET, EXTENDED RELEASE ORAL ONCE
Qty: 6 TABLET | Refills: 1 | Status: SHIPPED | OUTPATIENT
Start: 2025-02-18 | End: 2025-02-18

## 2025-02-18 NOTE — PROGRESS NOTES
[facility] INTERNAL MEDICINE  OUTPATIENT OFFICE VISIT NOTE    SUBJECTIVE:    HPI: Alyssa Anderson is a 61 y.o. yo female non-insulin diabetes mellitus type II, and CVA.  Patient was previously seen by another provider at this clinic.  Last hemoglobin A1c 5.9, currently on Metformin 500 mg daily.  Patient is tolerating Mounjaro, continue.   She currently follows Neurology due to history of stroke, currently on Plavix and aspirin. No new symptoms. Patient has residual symptoms: expressive aphasia.  She reports that she had seizures in the past and was placed on Keppra by Neurology. She states that she stopped taking the medication and started having seizures. She started the medication again and reports she has not had a seizure in many years.  At previous appointments, patient stated that she fell in the parking lot. She stated that right shoulder was still hurting and reported numbness.This has resolved.   Patient denies chest pain, palpitations, and shortness of breath.   Patient denies fever, night sweats, chills, nausea, vomiting, diarrhea, constipation, weight loss, and changes in appetite.    Review of Systems:  Constitutional: No fever, No chills, No sweats, No weakness, No fatigue, No decreased activity.   Eye: No recent visual problem, No icterus, No double vision.  Ear/Nose/Mouth/Throat: No nasal congestion, No sore throat.   Respiratory: No shortness of breath, No cough, No sputum production, No hemoptysis,   No wheezing, No cyanosis.   Cardiovascular: No chest pain, No palpitations, No peripheral edema, No syncope.   Gastrointestinal: No nausea, No vomiting, No diarrhea, No constipation, No hematemesis.   Genitourinary: No dysuria, No hematuria, No change in urine stream, No urethral discharge.   Hematology/Lymphatics: No bruising tendency, No bleeding tendency.   Endocrine: No polyuria, No cold intolerance, No heat intolerance.   Immunologic: Not immunocompromised, No recurrent fevers.   Musculoskeletal: No  "joint pain, No claudication.   Integumentary: No rash, No pruritus, No abrasions, No petechiae.    Neurologic: Alert and oriented X4, No abnormal balance, No numbness, No tingling.   Psychiatric: No anxiety, no depression, Not suicidal, Not delusional, No hallucinations.     OBJECTIVE:    Vitals:   BP 93/62 (BP Location: Left arm, Patient Position: Sitting)   Pulse 73   Temp 97.9 °F (36.6 °C) (Oral)   Resp 18   Ht 5' 7" (1.702 m)   Wt 64.8 kg (142 lb 12.8 oz)   SpO2 100%   BMI 22.37 kg/m²      Physical Exam:  General: Alert and oriented, No acute distress.   Eye: Pupils are equal, round and reactive to light, Extraocular movements are intact,   Normal conjunctiva, Vision unchanged.   HENT: Normocephalic, Normal hearing, Oral mucosa is moist.   Neck: Supple, No carotid bruit, No jugular venous distention, No thyromegaly.   Respiratory: Lungs are clear to auscultation, Respirations are non-labored, Breath sounds   are equal, Symmetrical chest wall expansion, No chest wall tenderness.   Cardiovascular: Normal rate, Regular rhythm, No murmur, No gallop, Good pulses equal in   All extremities, Normal peripheral perfusion, No edema.   Gastrointestinal: Soft, Non-tender, Non-distended, Normal bowel sounds.   Genitourinary: No costovertebral angle tenderness, No suprapubic tenderness.   Musculoskeletal: Normal range of motion, Normal strength, No swelling, No deformity, Normal gait.   Integumentary: Warm, No pallor, No rash.   Neurologic: Alert, Oriented, Normal sensory, Normal motor function, No focal deficits,   Cranial Nerves II-XII are grossly intact, Monofilament examination: No sensory loss on plantar   or dorsal surface of feet bilaterally.   Psychiatric: Cooperative, Appropriate mood & affect.     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 Previously, reported this injury affects her activities of daily living. Patient reports she can not bathe, sew, and can not carry heavy items. Sister does laundry for her at this time because she can not complete on her own. The patient reports symptoms are much improved.   Last hemoglobin A1C 5,0, previously 5.9 continue Metformin 500 mg daily. Diabetic fundus photos no retinopathy, last appointment with Ophthalmology. Monofilament examination was normal 6/2023, next screening 6/2024. Lifestyle modifications. Currently on Mounjaro 10 mg qweekly, side effects and history including family history was discussed, no history of pancreatitis and no family history of any thyroid cancers.   US limited retroperitoneum- unremarkable renal ultrasound. Avoid nephrotoxic drugs. Referral to Nephrology completed.   Blood pressure 93/62. Continue medications Lisinopril 40 mg daily, Norvasc 10 mg daily, and Hydrochlorothiazide 12.5 mg daily. Patient reports systolic blood pressure 100-115.  Patient reports no new seizures. Continue Keppra. Patient is on 500 mg BID per last note. Continue to follow Neurology. Neurology has recommended to continue Plavix and Aspirin.  Vitamin D 21.4 Previously 13.1. Completed on Vitamin D weekly for 12 weeks. Recheck at next visit.   Controlled with Effexor, which she was started on by another provider.  Continue to follow up with Ophthalmology and continue eyedrops.  Creatinine 1.42 and then increased to 1.66, instructed patient to drink plenty of water.   Potassium 3.2, prescription sent to the pharmacy. The patient admit to low food intake and low water intake due to decrease appetite due to Mounjaro.     Mounjaro will help with weight loss medication is being requested for diabetes mellitus type 2.       At next visit, will discuss referring to Nephrology after CMP.     Yvonne  MD Jed  [facility]

## 2025-02-19 RX ORDER — LEVETIRACETAM 500 MG/1
500 TABLET ORAL 2 TIMES DAILY
Qty: 90 TABLET | Refills: 2 | Status: SHIPPED | OUTPATIENT
Start: 2025-02-19

## 2025-03-12 DIAGNOSIS — F32.1 CURRENT MODERATE EPISODE OF MAJOR DEPRESSIVE DISORDER WITHOUT PRIOR EPISODE: Primary | ICD-10-CM

## 2025-03-12 RX ORDER — VENLAFAXINE HYDROCHLORIDE 75 MG/1
75 CAPSULE, EXTENDED RELEASE ORAL DAILY
Qty: 14 CAPSULE | Refills: 0 | Status: SHIPPED | OUTPATIENT
Start: 2025-03-12 | End: 2025-03-26

## 2025-03-14 ENCOUNTER — CLINICAL SUPPORT (OUTPATIENT)
Dept: OPHTHALMOLOGY | Facility: CLINIC | Age: 65
End: 2025-03-14
Payer: MEDICARE

## 2025-03-14 PROCEDURE — 99211 OFF/OP EST MAY X REQ PHY/QHP: CPT | Mod: PBBFAC,PN

## 2025-03-14 NOTE — PROGRESS NOTES
HPI    Here for a [facility] Only [facility] 24-2 for COAG OU.   Last edited by Aline Elena LPN on 3/14/2025 12:13 PM.            Assessment /Plan     For exam results, see Encounter Report.    There are no diagnoses linked to this encounter.  [facility] 24-2 done BRB 03/14/25

## 2025-04-07 ENCOUNTER — OFFICE VISIT (OUTPATIENT)
Dept: OPHTHALMOLOGY | Facility: CLINIC | Age: 65
End: 2025-04-07
Payer: MEDICARE

## 2025-04-07 VITALS — WEIGHT: 142.88 LBS | BODY MASS INDEX: 22.43 KG/M2 | HEIGHT: 67 IN

## 2025-04-07 DIAGNOSIS — H40.1122 PRIMARY OPEN ANGLE GLAUCOMA (POAG) OF LEFT EYE, MODERATE STAGE: ICD-10-CM

## 2025-04-07 DIAGNOSIS — H25.13 AGE-RELATED NUCLEAR CATARACT OF BOTH EYES: ICD-10-CM

## 2025-04-07 DIAGNOSIS — N18.2 TYPE 2 DIABETES MELLITUS WITH STAGE 2 CHRONIC KIDNEY DISEASE, WITHOUT LONG-TERM CURRENT USE OF INSULIN: ICD-10-CM

## 2025-04-07 DIAGNOSIS — H40.1113 PRIMARY OPEN ANGLE GLAUCOMA (POAG) OF RIGHT EYE, SEVERE STAGE: Primary | ICD-10-CM

## 2025-04-07 DIAGNOSIS — E11.22 TYPE 2 DIABETES MELLITUS WITH STAGE 2 CHRONIC KIDNEY DISEASE, WITHOUT LONG-TERM CURRENT USE OF INSULIN: ICD-10-CM

## 2025-04-07 PROCEDURE — 99213 OFFICE O/P EST LOW 20 MIN: CPT | Mod: PBBFAC,PN

## 2025-04-07 NOTE — PROGRESS NOTES
HPI    RTC: 4/7/25: IOP check  Last edited by Nita Anderson MA on 4/7/2025 11:36 AM.            Assessment /Plan     For exam results, see Encounter Report.    There are no diagnoses linked to this encounter.        OCT RNFL   10/1/24: 52//76: red STI//yellow I: stable  08/28/23: 49//77; red STI, green N // all green: stable  08/25/22: 50//76; all red // yel I    OCT Mac 10/1/24  - OD: 191, generalized retinal atrophy  - OS: 249, good foveal contour     HVF   8/3/22  - OD: 11/12 FL 38% FP 21% FN, unreliable dense SAD  - OS: 4/11 2% FP 0% FN, unreliable, nonspecific deficits   3/14/2025  OD: Unreliable with 8/14FL, 17%FL and moving head, dense superior and inferior arcuates  OS: More reliable, but moved head a lot?  Possible early nasal steps     12/27/22  - OD: 7/13 FL 8% FP 8% FN, dense superior defect mostly respecting horizontal; inf NS  - OS: 3/11 3% FP 5% FN, scattered inf and nasal misses - not matching prior misses    1/12/2024  OD: 4/13 FL, Borderline with superior altitudinal defect with central scotoma  OS: 5/11 FL, Borderline with early inferior nasal step  Stable from 12/27/22    A/P:    1. POAG, Right Eye, Severe  2. POAG, Left Eye, Moderate  - + FH, thin pachy  - gonio 2/3/22 open 360 OU  - target pressure 13 // 13, unknown tmax  - OCT RNFL 08/21 45 all red // 76 yellow I, rest green. Increased thinning OS compared to 8/2021  - 1/12/24- HVF stable w superior hemifield defect OD and scattered non-specific defects OS - **Hx of stroke in 2014 affecting right eye vision   - Current drops: xalantan QHS, cosopt BID, brimonidine TID  - Monocular precautions given  - OCT RNFL 10/1/24 stable from prior  - At this time, will not proceed with SLT or xen gel OS.  Patient denies any pain in OD and IOP is ok  12/27/22: new VF with stable findings OD (dens sup alt + early inf NS), os with nonspecific misses not consistent with prior. Continue drops as above. IOP doing well  - 8/28/2023: IOP 13 OU, at goal, OCT RNFL  stable Cont drops, if IOP elevated at next, may need to rediscuss SLT OS  - 10/1/24: IOP elevated to 16//18. Out of brim for 2 months, using cosopt/xal. Oct RNFL stable from last few years. Will refill drops and recheck IOP in 2 months.   - 12/3/24: Patient here for IOP check after running out of drops last visit. IOP at goal today. Doing well. Continue current management.   - 3/14/2025: The patient presented for a HVF only.  The VF are not reliable, again. Will have to rely on the OCT and IOP more.  - 4/7/2025: IOP 11/10. Compliant with current regimen. RTC 3 months IOP check.      3. NSC OU  - NVS, CTM     4. DM II w/o Retinopathy  Lab Results   Component Value Date    HGBA1C 5.0 02/17/2025     - encouraged good control/regular PCP f/u  - optos photos taken 10/1/24  - yearly DFE due 10/2025     5. PVD OU  - RD precautions  - monitor     6. Monocular status  - Polycarb Mrx sent 10/1/24       RTC 3 months IOP check

## 2025-04-15 ENCOUNTER — LAB VISIT (OUTPATIENT)
Dept: LAB | Facility: HOSPITAL | Age: 65
End: 2025-04-15
Attending: INTERNAL MEDICINE
Payer: MEDICARE

## 2025-04-15 ENCOUNTER — OFFICE VISIT (OUTPATIENT)
Dept: INTERNAL MEDICINE | Facility: CLINIC | Age: 65
End: 2025-04-15
Payer: MEDICARE

## 2025-04-15 VITALS
RESPIRATION RATE: 18 BRPM | DIASTOLIC BLOOD PRESSURE: 70 MMHG | WEIGHT: 140 LBS | OXYGEN SATURATION: 100 % | BODY MASS INDEX: 21.93 KG/M2 | HEART RATE: 67 BPM | TEMPERATURE: 98 F | SYSTOLIC BLOOD PRESSURE: 99 MMHG

## 2025-04-15 DIAGNOSIS — N18.2 TYPE 2 DIABETES MELLITUS WITH STAGE 2 CHRONIC KIDNEY DISEASE, WITHOUT LONG-TERM CURRENT USE OF INSULIN: ICD-10-CM

## 2025-04-15 DIAGNOSIS — E11.22 TYPE 2 DIABETES MELLITUS WITH STAGE 2 CHRONIC KIDNEY DISEASE, WITHOUT LONG-TERM CURRENT USE OF INSULIN: Primary | ICD-10-CM

## 2025-04-15 DIAGNOSIS — N18.2 TYPE 2 DIABETES MELLITUS WITH STAGE 2 CHRONIC KIDNEY DISEASE, WITHOUT LONG-TERM CURRENT USE OF INSULIN: Primary | ICD-10-CM

## 2025-04-15 DIAGNOSIS — E11.22 TYPE 2 DIABETES MELLITUS WITH STAGE 2 CHRONIC KIDNEY DISEASE, WITHOUT LONG-TERM CURRENT USE OF INSULIN: ICD-10-CM

## 2025-04-15 DIAGNOSIS — I20.0 UNSTABLE ANGINA: ICD-10-CM

## 2025-04-15 DIAGNOSIS — Z12.31 BREAST CANCER SCREENING BY MAMMOGRAM: ICD-10-CM

## 2025-04-15 LAB
ALBUMIN SERPL-MCNC: 3.7 G/DL (ref 3.4–4.8)
ALBUMIN/GLOB SERPL: 0.9 RATIO (ref 1.1–2)
ALP SERPL-CCNC: 65 UNIT/L (ref 40–150)
ALT SERPL-CCNC: 11 UNIT/L (ref 0–55)
ANION GAP SERPL CALC-SCNC: 9 MEQ/L
AST SERPL-CCNC: 15 UNIT/L (ref 11–45)
BILIRUB SERPL-MCNC: 0.4 MG/DL
BUN SERPL-MCNC: 14.8 MG/DL (ref 9.8–20.1)
CALCIUM SERPL-MCNC: 9.7 MG/DL (ref 8.4–10.2)
CHLORIDE SERPL-SCNC: 103 MMOL/L (ref 98–107)
CO2 SERPL-SCNC: 25 MMOL/L (ref 23–31)
CREAT SERPL-MCNC: 1.29 MG/DL (ref 0.55–1.02)
CREAT/UREA NIT SERPL: 11
EST. AVERAGE GLUCOSE BLD GHB EST-MCNC: 91.1 MG/DL
GFR SERPLBLD CREATININE-BSD FMLA CKD-EPI: 47 ML/MIN/1.73/M2
GLOBULIN SER-MCNC: 4.1 GM/DL (ref 2.4–3.5)
GLUCOSE SERPL-MCNC: 87 MG/DL (ref 82–115)
HBA1C MFR BLD: 4.8 %
POTASSIUM SERPL-SCNC: 3.4 MMOL/L (ref 3.5–5.1)
PROT SERPL-MCNC: 7.8 GM/DL (ref 5.8–7.6)
SODIUM SERPL-SCNC: 137 MMOL/L (ref 136–145)

## 2025-04-15 PROCEDURE — 80053 COMPREHEN METABOLIC PANEL: CPT

## 2025-04-15 PROCEDURE — 36415 COLL VENOUS BLD VENIPUNCTURE: CPT

## 2025-04-15 PROCEDURE — 83036 HEMOGLOBIN GLYCOSYLATED A1C: CPT

## 2025-04-15 PROCEDURE — 99214 OFFICE O/P EST MOD 30 MIN: CPT | Mod: PBBFAC | Performed by: INTERNAL MEDICINE

## 2025-04-15 RX ORDER — CLOPIDOGREL BISULFATE 75 MG/1
75 TABLET ORAL DAILY
Qty: 90 TABLET | Refills: 1 | Status: SHIPPED | OUTPATIENT
Start: 2025-04-15

## 2025-04-15 RX ORDER — POTASSIUM CHLORIDE 1500 MG/1
40 TABLET, EXTENDED RELEASE ORAL ONCE
COMMUNITY
Start: 2025-02-18

## 2025-04-15 NOTE — PROGRESS NOTES
[facility] INTERNAL MEDICINE  OUTPATIENT OFFICE VISIT NOTE    SUBJECTIVE:    HPI: Alyssa Anderson is a 61 y.o. yo female non-insulin diabetes mellitus type II, and CVA.  Patient was previously seen by another provider at this clinic.  Last hemoglobin A1c 5.9, currently on Metformin 500 mg daily.  Patient is tolerating Mounjaro, continue.   She currently follows Neurology due to history of stroke, currently on Plavix and aspirin. No new symptoms. Patient has residual symptoms: expressive aphasia.  She reports that she had seizures in the past and was placed on Keppra by Neurology. She states that she stopped taking the medication and started having seizures. She started the medication again and reports she has not had a seizure in many years.  At previous appointments, patient stated that she fell in the parking lot. She stated that right shoulder was still hurting and reported numbness.This has resolved.   Patient denies chest pain, palpitations, and shortness of breath.   Patient denies fever, night sweats, chills, nausea, vomiting, diarrhea, constipation, weight loss, and changes in appetite.    Review of Systems:  Constitutional: No fever, No chills, No sweats, No weakness, No fatigue, No decreased activity.   Eye: No recent visual problem, No icterus, No double vision.  Ear/Nose/Mouth/Throat: No nasal congestion, No sore throat.   Respiratory: No shortness of breath, No cough, No sputum production, No hemoptysis,   No wheezing, No cyanosis.   Cardiovascular: No chest pain, No palpitations, No peripheral edema, No syncope.   Gastrointestinal: No nausea, No vomiting, No diarrhea, No constipation, No hematemesis.   Genitourinary: No dysuria, No hematuria, No change in urine stream, No urethral discharge.   Hematology/Lymphatics: No bruising tendency, No bleeding tendency.   Endocrine: No polyuria, No cold intolerance, No heat intolerance.   Immunologic: Not immunocompromised, No recurrent fevers.   Musculoskeletal: No  joint pain, No claudication.   Integumentary: No rash, No pruritus, No abrasions, No petechiae.    Neurologic: Alert and oriented X4, No abnormal balance, No numbness, No tingling.   Psychiatric: No anxiety, no depression, Not suicidal, Not delusional, No hallucinations.     OBJECTIVE:    Vitals:   BP 99/70 (BP Location: Right arm, Patient Position: Sitting)   Pulse 67   Temp 98.4 °F (36.9 °C) (Oral)   Resp 18   Wt 63.5 kg (140 lb)   SpO2 100%   BMI 21.93 kg/m²      Physical Exam:  General: Alert and oriented, No acute distress.   Eye: Pupils are equal, round and reactive to light, Extraocular movements are intact,   Normal conjunctiva, Vision unchanged.   HENT: Normocephalic, Normal hearing, Oral mucosa is moist.   Neck: Supple, No carotid bruit, No jugular venous distention, No thyromegaly.   Respiratory: Lungs are clear to auscultation, Respirations are non-labored, Breath sounds   are equal, Symmetrical chest wall expansion, No chest wall tenderness.   Cardiovascular: Normal rate, Regular rhythm, No murmur, No gallop, Good pulses equal in   All extremities, Normal peripheral perfusion, No edema.   Gastrointestinal: Soft, Non-tender, Non-distended, Normal bowel sounds.   Genitourinary: No costovertebral angle tenderness, No suprapubic tenderness.   Musculoskeletal: Normal range of motion, Normal strength, No swelling, No deformity, Normal gait.   Integumentary: Warm, No pallor, No rash.   Neurologic: Alert, Oriented, Normal sensory, Normal motor function, No focal deficits,   Cranial Nerves II-XII are grossly intact, Monofilament examination: No sensory loss on plantar   or dorsal surface of feet bilaterally.   Psychiatric: Cooperative, Appropriate mood & affect.     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 Previously, reported this injury affects her activities of daily living. Patient reports she can not bathe, sew, and can not carry heavy items. Sister does laundry for her at this time because she can not complete on her own. The patient reports symptoms are much improved.   Last hemoglobin A1C 5,0, previously 5.9 continue Metformin 500 mg daily. Diabetic fundus photos no retinopathy, last appointment with Ophthalmology. Monofilament examination was normal 6/2023, next screening 6/2024. Lifestyle modifications. Currently on Mounjaro 10 mg qweekly, side effects and history including family history was discussed, no history of pancreatitis and no family history of any thyroid cancers.   US limited retroperitoneum- unremarkable renal ultrasound. Avoid nephrotoxic drugs. Referral to Nephrology completed.   Blood pressure 99/70. Continue medications Lisinopril 40 mg daily, Norvasc 10 mg daily, and Hydrochlorothiazide 12.5 mg daily. Patient reports systolic blood pressure 100-115.  Patient reports no new seizures. Continue Keppra. Patient is on 500 mg BID per last note. Continue to follow Neurology. Neurology has recommended to continue Plavix and Aspirin.  Vitamin D 21.4 Previously 13.1. Completed on Vitamin D weekly for 12 weeks. Recheck at next visit.   Controlled with Effexor, which she was started on by another provider.  Continue to follow up with Ophthalmology and continue eyedrops.  Creatinine 1.42 and then increased to 1.66, instructed patient to drink plenty of water.   Potassium 3.2, prescription sent to the pharmacy. The patient admit to low food intake and low water intake due to decrease appetite due to Mounjaro.     Mounjaro will help with weight loss medication is being requested for diabetes mellitus type 2.       At next visit, will discuss referring to Nephrology after CMP.     Yvonne Adkins MD  [facility]

## 2025-04-24 DIAGNOSIS — F32.1 CURRENT MODERATE EPISODE OF MAJOR DEPRESSIVE DISORDER WITHOUT PRIOR EPISODE: ICD-10-CM

## 2025-04-24 RX ORDER — VENLAFAXINE HYDROCHLORIDE 75 MG/1
75 CAPSULE, EXTENDED RELEASE ORAL DAILY
Qty: 14 CAPSULE | Refills: 0 | Status: SHIPPED | OUTPATIENT
Start: 2025-04-24 | End: 2025-05-08

## 2025-04-29 ENCOUNTER — HOSPITAL ENCOUNTER (OUTPATIENT)
Dept: RADIOLOGY | Facility: HOSPITAL | Age: 65
Discharge: HOME | End: 2025-04-29
Attending: INTERNAL MEDICINE
Payer: MEDICARE

## 2025-04-29 ENCOUNTER — HOSPITAL ENCOUNTER (OUTPATIENT)
Dept: CARDIOLOGY | Facility: HOSPITAL | Age: 65
Discharge: HOME | End: 2025-04-29
Attending: INTERNAL MEDICINE
Payer: MEDICARE

## 2025-04-29 VITALS
HEIGHT: 67 IN | RESPIRATION RATE: 20 BRPM | BODY MASS INDEX: 21.97 KG/M2 | SYSTOLIC BLOOD PRESSURE: 93 MMHG | HEART RATE: 82 BPM | WEIGHT: 140 LBS | DIASTOLIC BLOOD PRESSURE: 75 MMHG

## 2025-04-29 DIAGNOSIS — I20.0 UNSTABLE ANGINA: ICD-10-CM

## 2025-04-29 LAB
CV STRESS BASE HR: 87 BPM
DIASTOLIC BLOOD PRESSURE: 75 MMHG
NUC REST EJECTION FRACTION: 80
NUC STRESS EJECTION FRACTION: 73 %
OHS CV CPX 85 PERCENT MAX PREDICTED HEART RATE MALE: 133
OHS CV CPX ESTIMATED METS: 2
OHS CV CPX MAX PREDICTED HEART RATE: 157
OHS CV CPX PATIENT IS FEMALE: 1
OHS CV CPX PATIENT IS MALE: 0
OHS CV CPX PEAK DIASTOLIC BLOOD PRESSURE: 82 MMHG
OHS CV CPX PEAK HEAR RATE: 144 BPM
OHS CV CPX PEAK RATE PRESSURE PRODUCT: NORMAL
OHS CV CPX PEAK SYSTOLIC BLOOD PRESSURE: 129 MMHG
OHS CV CPX PERCENT MAX PREDICTED HEART RATE ACHIEVED: 96
OHS CV CPX RATE PRESSURE PRODUCT PRESENTING: 8091
OHS CV INITIAL DOSE: 28.7 MCG/KG/MIN
OHS CV PEAK DOSE: 9.6 MCG/KG/MIN
STRESS ECHO POST EXERCISE DUR MIN: 3 MINUTES
SYSTOLIC BLOOD PRESSURE: 93 MMHG

## 2025-04-29 PROCEDURE — 93017 CV STRESS TEST TRACING ONLY: CPT

## 2025-04-29 PROCEDURE — 78452 HT MUSCLE IMAGE SPECT MULT: CPT

## 2025-04-29 PROCEDURE — 63600175 PHARM REV CODE 636 W HCPCS: Performed by: INTERNAL MEDICINE

## 2025-04-29 PROCEDURE — A9502 TC99M TETROFOSMIN: HCPCS | Performed by: INTERNAL MEDICINE

## 2025-04-29 RX ORDER — REGADENOSON 0.08 MG/ML
0.4 INJECTION, SOLUTION INTRAVENOUS
Status: COMPLETED | OUTPATIENT
Start: 2025-04-29 | End: 2025-04-29

## 2025-04-29 RX ADMIN — REGADENOSON 0.4 MG: 0.08 INJECTION, SOLUTION INTRAVENOUS at 09:04

## 2025-04-29 RX ADMIN — TETROFOSMIN 9.6 MILLICURIE: 1.38 INJECTION, POWDER, LYOPHILIZED, FOR SOLUTION INTRAVENOUS at 09:04

## 2025-04-29 RX ADMIN — TETROFOSMIN 28.7 MILLICURIE: 1.38 INJECTION, POWDER, LYOPHILIZED, FOR SOLUTION INTRAVENOUS at 10:04

## 2025-04-30 LAB
OHS QRS DURATION: 84 MS
OHS QTC CALCULATION: 437 MS

## 2025-05-02 DIAGNOSIS — I10 HYPERTENSION, UNSPECIFIED TYPE: ICD-10-CM

## 2025-05-03 ENCOUNTER — PATIENT MESSAGE (OUTPATIENT)
Dept: INTERNAL MEDICINE | Facility: CLINIC | Age: 65
End: 2025-05-03
Payer: MEDICARE

## 2025-05-03 DIAGNOSIS — I10 HYPERTENSION, UNSPECIFIED TYPE: ICD-10-CM

## 2025-05-03 DIAGNOSIS — F32.1 CURRENT MODERATE EPISODE OF MAJOR DEPRESSIVE DISORDER WITHOUT PRIOR EPISODE: ICD-10-CM

## 2025-05-03 RX ORDER — VENLAFAXINE HYDROCHLORIDE 75 MG/1
75 CAPSULE, EXTENDED RELEASE ORAL DAILY
Qty: 14 CAPSULE | Refills: 0 | Status: CANCELLED | OUTPATIENT
Start: 2025-05-03 | End: 2025-05-17

## 2025-05-05 DIAGNOSIS — N18.2 TYPE 2 DIABETES MELLITUS WITH STAGE 2 CHRONIC KIDNEY DISEASE, WITHOUT LONG-TERM CURRENT USE OF INSULIN: ICD-10-CM

## 2025-05-05 DIAGNOSIS — E11.22 TYPE 2 DIABETES MELLITUS WITH STAGE 2 CHRONIC KIDNEY DISEASE, WITHOUT LONG-TERM CURRENT USE OF INSULIN: ICD-10-CM

## 2025-05-06 ENCOUNTER — PATIENT MESSAGE (OUTPATIENT)
Dept: INTERNAL MEDICINE | Facility: CLINIC | Age: 65
End: 2025-05-06
Payer: MEDICARE

## 2025-05-06 RX ORDER — VENLAFAXINE HYDROCHLORIDE 75 MG/1
75 CAPSULE, EXTENDED RELEASE ORAL DAILY
Qty: 30 CAPSULE | Refills: 2 | Status: SHIPPED | OUTPATIENT
Start: 2025-05-06

## 2025-05-06 RX ORDER — HYDROCHLOROTHIAZIDE 12.5 MG/1
12.5 TABLET ORAL DAILY
Qty: 90 TABLET | Refills: 1 | Status: SHIPPED | OUTPATIENT
Start: 2025-05-06

## 2025-05-06 RX ORDER — LISINOPRIL 40 MG/1
40 TABLET ORAL DAILY
Qty: 90 TABLET | Refills: 2 | Status: SHIPPED | OUTPATIENT
Start: 2025-05-06

## 2025-05-08 DIAGNOSIS — E11.22 TYPE 2 DIABETES MELLITUS WITH STAGE 2 CHRONIC KIDNEY DISEASE, WITHOUT LONG-TERM CURRENT USE OF INSULIN: ICD-10-CM

## 2025-05-08 DIAGNOSIS — N18.2 TYPE 2 DIABETES MELLITUS WITH STAGE 2 CHRONIC KIDNEY DISEASE, WITHOUT LONG-TERM CURRENT USE OF INSULIN: ICD-10-CM

## 2025-05-20 ENCOUNTER — HOSPITAL ENCOUNTER (EMERGENCY)
Facility: HOSPITAL | Age: 65
Discharge: HOME | End: 2025-05-20
Attending: EMERGENCY MEDICINE
Payer: MEDICARE

## 2025-05-20 ENCOUNTER — TELEPHONE (OUTPATIENT)
Dept: INTERNAL MEDICINE | Facility: CLINIC | Age: 65
End: 2025-05-20
Payer: MEDICARE

## 2025-05-20 ENCOUNTER — HOSPITAL ENCOUNTER (OUTPATIENT)
Dept: CARDIOLOGY | Facility: HOSPITAL | Age: 65
Discharge: HOME | End: 2025-05-20
Attending: INTERNAL MEDICINE
Payer: MEDICARE

## 2025-05-20 VITALS
WEIGHT: 134 LBS | BODY MASS INDEX: 21.03 KG/M2 | HEIGHT: 67 IN | SYSTOLIC BLOOD PRESSURE: 81 MMHG | DIASTOLIC BLOOD PRESSURE: 55 MMHG

## 2025-05-20 VITALS
BODY MASS INDEX: 21.03 KG/M2 | HEART RATE: 61 BPM | RESPIRATION RATE: 16 BRPM | SYSTOLIC BLOOD PRESSURE: 112 MMHG | OXYGEN SATURATION: 96 % | WEIGHT: 134 LBS | HEIGHT: 67 IN | TEMPERATURE: 98 F | DIASTOLIC BLOOD PRESSURE: 67 MMHG

## 2025-05-20 DIAGNOSIS — E87.6 HYPOKALEMIA: ICD-10-CM

## 2025-05-20 DIAGNOSIS — R55 SYNCOPE: ICD-10-CM

## 2025-05-20 DIAGNOSIS — I20.0 UNSTABLE ANGINA: ICD-10-CM

## 2025-05-20 DIAGNOSIS — E86.1 HYPOVOLEMIA: Primary | ICD-10-CM

## 2025-05-20 LAB
ALBUMIN SERPL-MCNC: 3.6 G/DL (ref 3.4–4.8)
ALBUMIN/GLOB SERPL: 0.9 RATIO (ref 1.1–2)
ALP SERPL-CCNC: 63 UNIT/L (ref 40–150)
ALT SERPL-CCNC: 7 UNIT/L (ref 0–55)
ANION GAP SERPL CALC-SCNC: 11 MEQ/L
AST SERPL-CCNC: 11 UNIT/L (ref 11–45)
BASOPHILS # BLD AUTO: 0.06 X10(3)/MCL
BASOPHILS NFR BLD AUTO: 1.1 %
BILIRUB SERPL-MCNC: 0.7 MG/DL
BUN SERPL-MCNC: 20 MG/DL (ref 9.8–20.1)
CALCIUM SERPL-MCNC: 9.7 MG/DL (ref 8.4–10.2)
CHLORIDE SERPL-SCNC: 104 MMOL/L (ref 98–107)
CO2 SERPL-SCNC: 25 MMOL/L (ref 23–31)
CREAT SERPL-MCNC: 1.72 MG/DL (ref 0.55–1.02)
CREAT/UREA NIT SERPL: 12
EOSINOPHIL # BLD AUTO: 0.05 X10(3)/MCL (ref 0–0.9)
EOSINOPHIL NFR BLD AUTO: 0.9 %
ERYTHROCYTE [DISTWIDTH] IN BLOOD BY AUTOMATED COUNT: 12.4 % (ref 11.5–17)
GFR SERPLBLD CREATININE-BSD FMLA CKD-EPI: 33 ML/MIN/1.73/M2
GLOBULIN SER-MCNC: 3.8 GM/DL (ref 2.4–3.5)
GLUCOSE SERPL-MCNC: 79 MG/DL (ref 82–115)
HCT VFR BLD AUTO: 31.6 % (ref 37–47)
HGB BLD-MCNC: 10.8 G/DL (ref 12–16)
HOLD SPECIMEN: NORMAL
IMM GRANULOCYTES # BLD AUTO: 0.01 X10(3)/MCL (ref 0–0.04)
IMM GRANULOCYTES NFR BLD AUTO: 0.2 %
LACTATE SERPL-SCNC: 1.4 MMOL/L (ref 0.5–2.2)
LYMPHOCYTES # BLD AUTO: 1.91 X10(3)/MCL (ref 0.6–4.6)
LYMPHOCYTES NFR BLD AUTO: 35 %
MCH RBC QN AUTO: 31.3 PG (ref 27–31)
MCHC RBC AUTO-ENTMCNC: 34.2 G/DL (ref 33–36)
MCV RBC AUTO: 91.6 FL (ref 80–94)
MONOCYTES # BLD AUTO: 0.42 X10(3)/MCL (ref 0.1–1.3)
MONOCYTES NFR BLD AUTO: 7.7 %
NEUTROPHILS # BLD AUTO: 3 X10(3)/MCL (ref 2.1–9.2)
NEUTROPHILS NFR BLD AUTO: 55.1 %
NRBC BLD AUTO-RTO: 0 %
OHS QRS DURATION: 82 MS
OHS QTC CALCULATION: 446 MS
PLATELET # BLD AUTO: 267 X10(3)/MCL (ref 130–400)
PMV BLD AUTO: 10.6 FL (ref 7.4–10.4)
POCT GLUCOSE: 81 MG/DL (ref 70–110)
POTASSIUM SERPL-SCNC: 3 MMOL/L (ref 3.5–5.1)
PROT SERPL-MCNC: 7.4 GM/DL (ref 5.8–7.6)
RBC # BLD AUTO: 3.45 X10(6)/MCL (ref 4.2–5.4)
SODIUM SERPL-SCNC: 140 MMOL/L (ref 136–145)
TROPONIN I SERPL-MCNC: <0.01 NG/ML (ref 0–0.04)
WBC # BLD AUTO: 5.45 X10(3)/MCL (ref 4.5–11.5)

## 2025-05-20 PROCEDURE — 25000003 PHARM REV CODE 250: Performed by: EMERGENCY MEDICINE

## 2025-05-20 PROCEDURE — 36415 COLL VENOUS BLD VENIPUNCTURE: CPT | Performed by: EMERGENCY MEDICINE

## 2025-05-20 PROCEDURE — 84484 ASSAY OF TROPONIN QUANT: CPT | Performed by: EMERGENCY MEDICINE

## 2025-05-20 PROCEDURE — 96360 HYDRATION IV INFUSION INIT: CPT

## 2025-05-20 PROCEDURE — 80053 COMPREHEN METABOLIC PANEL: CPT | Performed by: EMERGENCY MEDICINE

## 2025-05-20 PROCEDURE — 83605 ASSAY OF LACTIC ACID: CPT | Performed by: EMERGENCY MEDICINE

## 2025-05-20 PROCEDURE — 93306 TTE W/DOPPLER COMPLETE: CPT

## 2025-05-20 PROCEDURE — 99284 EMERGENCY DEPT VISIT MOD MDM: CPT | Mod: 25

## 2025-05-20 PROCEDURE — 85025 COMPLETE CBC W/AUTO DIFF WBC: CPT | Performed by: EMERGENCY MEDICINE

## 2025-05-20 PROCEDURE — 93005 ELECTROCARDIOGRAM TRACING: CPT

## 2025-05-20 RX ADMIN — SODIUM CHLORIDE 1000 ML: 9 INJECTION, SOLUTION INTRAVENOUS at 12:05

## 2025-05-20 RX ADMIN — POTASSIUM BICARBONATE 50 MEQ: 977.5 TABLET, EFFERVESCENT ORAL at 01:05

## 2025-05-20 NOTE — ED PROVIDER NOTES
Encounter Date: 5/20/2025       History     Chief Complaint   Patient presents with    Loss of Consciousness    Hypotension     Patient reports to the ed c/o near syncopal episode prior to having ECHO done on this A.M. AAO (x)3. Denies weakness and dizziness at this time. Cbg=81 mg/dl. Bp=86/59. 12 lead EKG pending.     Patient is here reporting several weeks of hypotension, with intermittent presyncope.  Patient reports she takes several medicines for blood pressure and takes them reliably.  Patient was seen in cardiology clinic this morning with an echo was performed.  She was noted to be hypotensive there and sent here for evaluation.  She is indeed modestly hypotensive on arrival here.  She is nevertheless alert, cooperative, communicative and appropriate.  Patient denying fever, chills, nausea.  Patient denying diaphoresis, changes in bowel habits.        Review of patient's allergies indicates:  No Known Allergies  Past Medical History:   Diagnosis Date    Cataract     Diabetes mellitus     Glaucoma     Stroke 2014     Past Surgical History:   Procedure Laterality Date    COLONOSCOPY N/A 10/13/2022    Procedure: COLONOSCOPY;  Surgeon: Yusuf Iqbal MD;  Location: [facility] ENDOSCOPY;  Service: Endoscopy;  Laterality: N/A;  Hold Plavix 5 days- confirmed with pt-lw    HYSTERECTOMY  01/01/1984     Family History   Problem Relation Name Age of Onset    Diabetes Mother Erika Bryan     Glaucoma Mother Erika Bryan     Vision loss Maternal Grandmother Yamile Hemphill     Hypertension Sister Kandi SMITH     No Known Problems Father      No Known Problems Brother      No Known Problems Maternal Aunt      No Known Problems Maternal Uncle      No Known Problems Paternal Aunt      No Known Problems Paternal Uncle      No Known Problems Maternal Grandfather      No Known Problems Paternal Grandmother      No Known Problems Paternal Grandfather      Amblyopia Neg Hx      Blindness Neg Hx      Cancer Neg Hx       Cataracts Neg Hx      Macular degeneration Neg Hx      Retinal detachment Neg Hx      Strabismus Neg Hx      Stroke Neg Hx      Thyroid disease Neg Hx       Social History[1]  Review of Systems    Physical Exam     Initial Vitals [05/20/25 1145]   BP Pulse Resp Temp SpO2   (!) 86/59 69 18 98.2 °F (36.8 °C) 99 %      MAP       --         Physical Exam    Nursing note and vitals reviewed.  Constitutional: She appears well-developed and well-nourished.   HENT:   Head: Normocephalic and atraumatic.   Eyes: Conjunctivae and EOM are normal. Pupils are equal, round, and reactive to light.   Neck: No tracheal deviation present.   Normal range of motion.  Cardiovascular:  Normal rate, regular rhythm and normal heart sounds.           Pulmonary/Chest: Breath sounds normal. No respiratory distress. She exhibits no tenderness.   Abdominal: Abdomen is soft. She exhibits no distension. There is no abdominal tenderness. There is no rebound.   Musculoskeletal:         General: No tenderness. Normal range of motion.      Cervical back: Normal range of motion.     Neurological: She is alert and oriented to person, place, and time. GCS score is 15. GCS eye subscore is 4. GCS verbal subscore is 5. GCS motor subscore is 6.   Skin: No rash and no abscess noted.   Psychiatric: She has a normal mood and affect. Her behavior is normal. Judgment and thought content normal.         ED Course   Procedures  Labs Reviewed   COMPREHENSIVE METABOLIC PANEL - Abnormal       Result Value    Sodium 140      Potassium 3.0 (*)     Chloride 104      CO2 25      Glucose 79 (*)     Blood Urea Nitrogen 20.0      Creatinine 1.72 (*)     Calcium 9.7      Protein Total 7.4      Albumin 3.6      Globulin 3.8 (*)     Albumin/Globulin Ratio 0.9 (*)     Bilirubin Total 0.7      ALP 63      ALT 7      AST 11      eGFR 33      Anion Gap 11.0      BUN/Creatinine Ratio 12     CBC WITH DIFFERENTIAL - Abnormal    WBC 5.45      RBC 3.45 (*)     Hgb 10.8 (*)     Hct 31.6  (*)     MCV 91.6      MCH 31.3 (*)     MCHC 34.2      RDW 12.4      Platelet 267      MPV 10.6 (*)     Neut % 55.1      Lymph % 35.0      Mono % 7.7      Eos % 0.9      Basophil % 1.1      Imm Grans % 0.2      Neut # 3.00      Lymph # 1.91      Mono # 0.42      Eos # 0.05      Baso # 0.06      Imm Gran # 0.01      NRBC% 0.0     LACTIC ACID, PLASMA - Normal    Lactic Acid Level 1.4     TROPONIN I - Normal    Troponin-I <0.010     CBC W/ AUTO DIFFERENTIAL    Narrative:     The following orders were created for panel order CBC auto differential.  Procedure                               Abnormality         Status                     ---------                               -----------         ------                     CBC with Differential[[id number]]       Abnormal            Final result                 Please view results for these tests on the individual orders.   URINALYSIS, REFLEX TO URINE CULTURE   EXTRA TUBES    Narrative:     The following orders were created for panel order EXTRA TUBES.  Procedure                               Abnormality         Status                     ---------                               -----------         ------                     Light Blue Top Hold[[id number]]                             In process                 Red Top Hold[[id number]]                                    In process                 Lavender Top Hold[[id number]]                               In process                 Gold Top Hold[[id number]]                                   In process                   Please view results for these tests on the individual orders.   LIGHT BLUE TOP HOLD   RED TOP HOLD   LAVENDER TOP HOLD   GOLD TOP HOLD        ECG Results              EKG 12-lead (Syncope) Age >50 (In process)        Collection Time Result Time QRS Duration [facility] QTC Calculation    05/20/25 11:47:59 05/20/25 11:51:53 82 446                     In process by Interface, Lab In [facility] (05/20/25 11:51:58)                    Narrative:    Test Reason : R55,    Vent. Rate :  68 BPM     Atrial Rate :  68 BPM     P-R Int : 146 ms          QRS Dur :  82 ms      QT Int : 420 ms       P-R-T Axes :  96   8  46 degrees    QTcB Int : 446 ms    Sinus rhythm with marked sinus arrythmia  Otherwise normal ECG  When compared with ECG of 29-Apr-2025 09:36,  ST no longer depressed in Inferior leads  T wave inversion no longer evident in Inferior leads  T wave inversion no longer evident in Lateral leads    Referred By:            Confirmed By:                                   Imaging Results    None          Medications   sodium chloride 0.9% bolus 1,000 mL 1,000 mL (1,000 mLs Intravenous New Bag 5/20/25 1240)   potassium bicarbonate disintegrating tablet 50 mEq (has no administration in time range)     Medical Decision Making  Amount and/or Complexity of Data Reviewed  External Data Reviewed: notes.  Labs: ordered. Decision-making details documented in ED Course.  ECG/medicine tests: ordered and independent interpretation performed. Decision-making details documented in ED Course.  Discussion of management or test interpretation with external provider(s): Discussed with inpatient medicine team plan, plan discontinue chlorthalidone, follow up in clinic.    Risk  Prescription drug management.  Risk Details: Patient is here with presyncopal symptoms reporting 2 weeks of hypotension.  She was hypotensive in the echo lab this morning and sent here for evaluation.  She is mildly hypotensive here as well.  Risk is found sufficient to warrant evaluation with lab data, EKG, response to crystalloid challenge.  Lab data demonstrate modest azotemia in comparison to baseline, modest hypokalemia.  Blood pressure improves in response to crystalloid challenge.  Plan discontinue chlorthalidone, expedited outpatient follow up in clinic.               ED Course as of 05/20/25 1324   Tue May 20, 2025   1220 EKG demonstrates sinus rhythm at 68, normal axis and  intervals, nonacute and nonischemic appearing; [CT]   1300 Reassuring hemogram; [CT]   1312 Negative lactate; [CT]   1312 Mild hypokalemia; [CT]   1314 Negative troponin; [CT]      ED Course User Index  [CT] Guero Washington MD                           Clinical Impression:  Final diagnoses:  [R55] Syncope  [E86.1] Hypovolemia (Primary)  [E87.6] Hypokalemia          ED Disposition Condition    Discharge Stable          ED Prescriptions    None       Follow-up Information       Follow up With Specialties Details Why Contact Info    [facility] Dept Emergency Medicine  As needed, If symptoms worsen [address] [facility] [id number] [phone]    Yvonne Adkins MD Internal Medicine Call   [address] [facility] [address]  [phone]                   Guero Washington MD  05/20/25 1325         [1]   Social History  Tobacco Use    Smoking status: Never     Passive exposure: Never    Smokeless tobacco: Never   Substance Use Topics    Alcohol use: Never    Drug use: Never        Guero Washington MD  05/20/25 [id number]

## 2025-05-21 LAB
APICAL FOUR CHAMBER EJECTION FRACTION: 53 %
APICAL TWO CHAMBER EJECTION FRACTION: 52 %
AV INDEX (PROSTH): 0.69
AV MEAN GRADIENT: 2 MMHG
AV PEAK GRADIENT: 4 MMHG
AV VALVE AREA BY VELOCITY RATIO: 2.9 CM²
AV VALVE AREA: 3.4 CM²
AV VELOCITY RATIO: 0.6
BSA FOR ECHO PROCEDURE: 1.69 M2
CV ECHO LV RWT: 0.29 CM
DOP CALC AO PEAK VEL: 1 M/S
DOP CALC AO VTI: 18.3 CM
DOP CALC LVOT AREA: 4.9 CM2
DOP CALC LVOT DIAMETER: 2.5 CM
DOP CALC LVOT PEAK VEL: 0.6 M/S
DOP CALC LVOT STROKE VOLUME: 62.3 CM3
DOP CALC MV VTI: 18.9 CM
DOP CALCLVOT PEAK VEL VTI: 12.7 CM
E WAVE DECELERATION TIME: 259 MSEC
E/A RATIO: 0.61
E/E' RATIO: 6 M/S
ECHO LV POSTERIOR WALL: 0.7 CM (ref 0.6–1.1)
FRACTIONAL SHORTENING: 26.5 % (ref 28–44)
INTERVENTRICULAR SEPTUM: 0.9 CM (ref 0.6–1.1)
LEFT ATRIUM SIZE: 3.6 CM
LEFT INTERNAL DIMENSION IN SYSTOLE: 3.6 CM (ref 2.1–4)
LEFT VENTRICLE DIASTOLIC VOLUME INDEX: 66.08 ML/M2
LEFT VENTRICLE DIASTOLIC VOLUME: 113 ML
LEFT VENTRICLE END DIASTOLIC VOLUME APICAL 2 CHAMBER: 67.56 ML
LEFT VENTRICLE END DIASTOLIC VOLUME APICAL 4 CHAMBER: 66.76 ML
LEFT VENTRICLE MASS INDEX: 76.7 G/M2
LEFT VENTRICLE SYSTOLIC VOLUME INDEX: 32.2 ML/M2
LEFT VENTRICLE SYSTOLIC VOLUME: 55 ML
LEFT VENTRICULAR INTERNAL DIMENSION IN DIASTOLE: 4.9 CM (ref 3.5–6)
LEFT VENTRICULAR MASS: 131.2 G
LV LATERAL E/E' RATIO: 4.7 M/S
LV SEPTAL E/E' RATIO: 8.3 M/S
LVED V (TEICH): 113.47 ML
LVES V (TEICH): 54.72 ML
LVOT MG: 0.69 MMHG
LVOT MV: 0.36 CM/S
MV MEAN GRADIENT: 0 MMHG
MV PEAK A VEL: 0.54 M/S
MV PEAK E VEL: 0.33 M/S
MV PEAK GRADIENT: 1 MMHG
MV STENOSIS PRESSURE HALF TIME: 75.18 MS
MV VALVE AREA BY CONTINUITY EQUATION: 3.3 CM2
MV VALVE AREA P 1/2 METHOD: 2.93 CM2
OHS CV RV/LV RATIO: 0.53 CM
OHS LV EJECTION FRACTION SIMPSONS BIPLANE MOD: 53 %
PISA TR MAX VEL: 2.2 M/S
RA PRESSURE ESTIMATED: 3 MMHG
RIGHT VENTRICLE DIASTOLIC BASEL DIMENSION: 2.6 CM
RIGHT VENTRICULAR END-DIASTOLIC DIMENSION: 2.56 CM
RV TB RVSP: 5 MMHG
TDI LATERAL: 0.07 M/S
TDI SEPTAL: 0.04 M/S
TDI: 0.06 M/S
TR MAX PG: 18 MMHG
TRICUSPID ANNULAR PLANE SYSTOLIC EXCURSION: 2.1 CM
TV REST PULMONARY ARTERY PRESSURE: 22 MMHG
Z-SCORE OF LEFT VENTRICULAR DIMENSION IN END DIASTOLE: 0.31
Z-SCORE OF LEFT VENTRICULAR DIMENSION IN END SYSTOLE: 1.6

## 2025-05-22 ENCOUNTER — OFFICE VISIT (OUTPATIENT)
Dept: CARDIOLOGY | Facility: CLINIC | Age: 65
End: 2025-05-22
Payer: MEDICARE

## 2025-05-22 VITALS
BODY MASS INDEX: 21.19 KG/M2 | OXYGEN SATURATION: 100 % | HEIGHT: 67 IN | RESPIRATION RATE: 20 BRPM | DIASTOLIC BLOOD PRESSURE: 75 MMHG | SYSTOLIC BLOOD PRESSURE: 108 MMHG | WEIGHT: 135 LBS | TEMPERATURE: 98 F | HEART RATE: 68 BPM

## 2025-05-22 DIAGNOSIS — I63.9 CEREBROVASCULAR ACCIDENT (CVA), UNSPECIFIED MECHANISM: ICD-10-CM

## 2025-05-22 DIAGNOSIS — E11.22 TYPE 2 DIABETES MELLITUS WITH STAGE 2 CHRONIC KIDNEY DISEASE, WITHOUT LONG-TERM CURRENT USE OF INSULIN: ICD-10-CM

## 2025-05-22 DIAGNOSIS — R94.39 ABNORMAL FINDING ON CARDIOVASCULAR STRESS TEST: ICD-10-CM

## 2025-05-22 DIAGNOSIS — R07.89 OTHER CHEST PAIN: ICD-10-CM

## 2025-05-22 DIAGNOSIS — I10 HYPERTENSION, UNSPECIFIED TYPE: ICD-10-CM

## 2025-05-22 DIAGNOSIS — E78.5 HYPERLIPIDEMIA LDL GOAL <70: Primary | ICD-10-CM

## 2025-05-22 DIAGNOSIS — N18.2 TYPE 2 DIABETES MELLITUS WITH STAGE 2 CHRONIC KIDNEY DISEASE, WITHOUT LONG-TERM CURRENT USE OF INSULIN: ICD-10-CM

## 2025-05-22 PROCEDURE — 99215 OFFICE O/P EST HI 40 MIN: CPT | Mod: PBBFAC | Performed by: INTERNAL MEDICINE

## 2025-05-22 RX ORDER — ATORVASTATIN CALCIUM 40 MG/1
40 TABLET, FILM COATED ORAL DAILY
Qty: 90 TABLET | Refills: 3 | Status: SHIPPED | OUTPATIENT
Start: 2025-05-22

## 2025-05-22 RX ORDER — AMLODIPINE BESYLATE 5 MG/1
5 TABLET ORAL DAILY
Qty: 90 TABLET | Refills: 3 | Status: SHIPPED | OUTPATIENT
Start: 2025-05-22

## 2025-05-22 NOTE — PROGRESS NOTES
[facility]  Outpatient Cardiology Clinic    Chief Complaint: Positive Stress Test, Referral (Test Results), Denies chest pains, Shortness of Breath (On exertion), Palpitations, and Dizziness                            HPI:  Alyssa Anderson 64 y.o. female with DM2, HTN, HLP, CKD3, CVA in 2014 with expressive aphasia who is referred to cardiology for abnormal stress test.     Pt has been complaining of chest tightness for the last 6 months that occurs mostly when she lying down and trying to sleep. Tightness lasts until she falls asleep. Chest tightness never happens on exertion. She reports not sleeping well in general and not having a good sleep pattern.     She then underwent a nuclear stress test that showed a mild intensity, small to medium fixed defect in the distal to apical anterior wall.    She went to the ED on 5/20/2025 due to severe lightheadedness nad was noted to be hypotensive in the 80s. They took her off HCTZ 12.5mg. She has been losing weight while on Mounjaro and has lost about 45 lbs in the last year.   No other major cardiovascular complaints.                                                                                                                                                                                                                                                                                                                                                                                                                                                                               CARDIAC TESTING:  Results for orders placed during the hospital encounter of 05/20/25    Echo Saline Bubble? Yes; Ultrasound enhancing contrast? No    Interpretation Summary    Left Ventricle: The left ventricle is normal in size. Normal wall thickness. There is low normal systolic function. Quantitated ejection fraction is 53%. There is grade I diastolic dysfunction.    Right Ventricle: The right ventricle is  normal in size Systolic function is normal.    Left Atrium: Agitated saline study of the atrial septum is negative after vasalva maneuver, suggesting absence of intracardiac shunt at the atrial level.    Aortic Valve: The aortic valve is a trileaflet valve. There is no stenosis. Aortic valve peak velocity is 1.0 m/s. Mean gradient is 2 mmHg.    Mitral Valve: The mitral valve is structurally normal.    Tricuspid Valve: The tricuspid valve is structurally normal. There is mild regurgitation.    Pulmonary Artery: The estimated pulmonary artery systolic pressure is 22 mmHg.    IVC/SVC: Normal venous pressure at 3 mmHg.    Results for orders placed during the hospital encounter of 04/29/25    Nuclear Stress - Cardiology Interpreted    Interpretation Summary    Abnormal myocardial perfusion scan.    There is a mild intensity, small to medium sized, fixed perfusion abnormality consistent with scar in the distal to apical septal wall(s) in the typical distribution of the LAD territory.    There are no other significant perfusion abnormalities.    The gated perfusion images showed an ejection fraction of 80% at rest. The gated perfusion images showed an ejection fraction of 73% post stress.    There is normal wall motion at rest and post-stress.    LV cavity size is normal at rest and normal at post-stress.    The ECG portion of the study is abnormal but not diagnostic due to resting ST-T abnormalities.    The patient reported no chest pain during the stress test.    During stress, occasional PVCs are noted.     No results found for this or any previous visit.          Problem List[1]  Past Surgical History:   Procedure Laterality Date    COLONOSCOPY N/A 10/13/2022    Procedure: COLONOSCOPY;  Surgeon: Yusuf Iqbal MD;  Location: [facility] ENDOSCOPY;  Service: Endoscopy;  Laterality: N/A;  Hold Plavix 5 days- confirmed with pt-lw    HYSTERECTOMY  01/01/1984     Social History[2]     Family History   Problem Relation Name Age  "of Onset    Diabetes Mother Erika Bryan     Glaucoma Mother Erika Bryan     Vision loss Maternal Grandmother Yamile Hemphill     Hypertension Sister Kandi SMITH     No Known Problems Father      No Known Problems Brother      No Known Problems Maternal Aunt      No Known Problems Maternal Uncle      No Known Problems Paternal Aunt      No Known Problems Paternal Uncle      No Known Problems Maternal Grandfather      No Known Problems Paternal Grandmother      No Known Problems Paternal Grandfather      Amblyopia Neg Hx      Blindness Neg Hx      Cancer Neg Hx      Cataracts Neg Hx      Macular degeneration Neg Hx      Retinal detachment Neg Hx      Strabismus Neg Hx      Stroke Neg Hx      Thyroid disease Neg Hx       Review of patient's allergies indicates:  No Known Allergies      ROS:                                                                                                                                                                             Negative except as stated in the history of present illness. See HPI for details.    PHYSICAL EXAM:  Visit Vitals  /75 (BP Location: Left arm, Patient Position: Sitting)   Pulse 68   Temp 98.1 °F (36.7 °C) (Oral)   Resp 20   Ht 5' 7" (1.702 m)   Wt 61.2 kg (135 lb)   SpO2 100%   BMI 21.14 kg/m²       General: alert and oriented/no acute distress  Eye: EOMI/normal conjunctiva/no xanthelasma  HENT: normocephalic/moist oral mucosa  Neck: supple/nontender/no carotid bruit  Respiratory: lungs CTA/nonlabored respirations/BS equal/symmetrical expansion/no  chest wall tenderness  Cardiovascular: normal rate/normal rhythm/no murmur/normal peripheral perfusion/no  edema/no JVD  Gastrointestinal: soft/nontender  Musculoskeletal: normal ROM  Integumentary: warm/dry/pink/intact  Neurologic: alert/oriented/normal sensory/no focal deficits  Psychiatric: cooperative/appropriate mood and affect/normal judgment    Current Outpatient Medications   Medication " Instructions    amLODIPine (NORVASC) 10 mg, Oral, Daily    aspirin (ECOTRIN) 81 mg, Oral, Daily    atorvastatin (LIPITOR) 20 mg, Oral, Daily    brimonidine 0.2% (ALPHAGAN) 0.2 % Drop 1 drop, Both Eyes, 3 times daily    clopidogreL (PLAVIX) 75 mg, Oral, Daily    diclofenac sodium (VOLTAREN) 2 g, Topical (Top), 4 times daily    dorzolamide-timolol 2-0.5% (COSOPT) 22.3-6.8 mg/mL ophthalmic solution 1 drop, Both Eyes, 2 times daily    ergocalciferol (ERGOCALCIFEROL) 50,000 Units, Oral, Every 7 days    latanoprost 0.005 % ophthalmic solution 1 drop, Both Eyes, Nightly    levETIRAcetam (KEPPRA) 500 mg, Oral, 2 times daily    lisinopriL (PRINIVIL,ZESTRIL) 40 mg, Oral, Daily    metFORMIN (GLUCOPHAGE) 500 mg, Oral, With breakfast    potassium chloride (K-TAB) 20 mEq 40 mEq, Once    tirzepatide 10 mg, Subcutaneous, Every 7 days    venlafaxine (EFFEXOR-XR) 75 mg, Oral, Daily        All medications, laboratory studies, cardiac diagnostic imaging independently reviewed.     Lab Results   Component Value Date    TOTALCHOLEST 3 02/17/2025    TOTALCHOLEST 2 03/26/2024    HDL 47 02/17/2025    HDL 79 (H) 03/26/2024    LDL 90.00 02/17/2025    .00 03/26/2024    TRIG 82 02/17/2025    TRIG 72 03/26/2024    TROPONINI <0.010 05/20/2025    CREATININE 1.72 (H) 05/20/2025    CREATININE 1.29 (H) 04/15/2025    K 3.0 (L) 05/20/2025    K 3.4 (L) 04/15/2025        ASSESSMENT/PLAN:    Non anginal chest pain  Abnormal stress test  As per HPI patient complains of chest tightness only at night when she lies down to sleep.  Chest pain appears non anginal in nature.  Nuclear stress test only showed small mild distal to apical anterior infarct.  At this time I do not think proceeding with a coronary angiogram is indicated  Patient is already on DAPT and statin       Hypertension   Patient is on amlodipine 10 mg, lisinopril 40 mg.  She was recently taken off HCTZ 12.5 mg due to hypotension.    Patient has lost over 40 lbs in the last year.  We will  decrease amlodipine to 5 mg daily.    Patient will be seeing her PCP in 2 months.  Advised her to keep a BP log to review with her PCP.    Hyperlipidemia   LDL 90  Goal LDL less than 70 given history of CVA and diabetes   We will increase Lipitor to 40 mg   Repeat lipid panel in 2 months       History of CVA   On DAPT  Unclear why patient needs to be on 2 antiplatelets but will defer to primary care    DM   A1c 4.8%, Continue management per PCP       Marleni Gilliam MD  Cardiology staff         [1]   Patient Active Problem List  Diagnosis    Age-related nuclear cataract of both eyes    Primary open angle glaucoma (POAG) of right eye, severe stage    Primary open angle glaucoma (POAG) of left eye, moderate stage    Type 2 diabetes mellitus with stage 2 chronic kidney disease, without long-term current use of insulin    Seizure disorder    Encounter for screening colonoscopy for non-high-risk patient    Nondisplaced fracture of greater tuberosity of right humerus, initial encounter for closed fracture   [2]   Social History  Socioeconomic History    Marital status: Single   Tobacco Use    Smoking status: Never     Passive exposure: Never    Smokeless tobacco: Never   Substance and Sexual Activity    Alcohol use: Never    Drug use: Never    Sexual activity: Not Currently     Partners: Male     Birth control/protection: Abstinence     Social Drivers of Health     Financial Resource Strain: Low Risk  (4/15/2025)    Overall Financial Resource Strain (CARDIA)     Difficulty of Paying Living Expenses: Not hard at all   Food Insecurity: No Food Insecurity (4/15/2025)    Hunger Vital Sign     Worried About Running Out of Food in the Last Year: Never true     Ran Out of Food in the Last Year: Never true   Transportation Needs: No Transportation Needs (4/15/2025)    PRAPARE - Transportation     Lack of Transportation (Medical): No     Lack of Transportation (Non-Medical): No   Physical Activity: Insufficiently Active (4/15/2025)     Exercise Vital Sign     Days of Exercise per Week: 3 days     Minutes of Exercise per Session: 30 min   Stress: No Stress Concern Present (4/15/2025)    [employer] [place] [employer] - Occupational Stress Questionnaire     Feeling of Stress : Not at all   Housing Stability: Low Risk  (4/15/2025)    Housing Stability Vital Sign     Unable to Pay for Housing in the Last Year: No     Homeless in the Last Year: No

## 2025-05-22 NOTE — PATIENT INSTRUCTIONS
Decrease amlodipine to 5 mg daily  Increase Lipitor/atorvastatin to 40 mg  Keep B/P log and bring to appointments  Keep 6 month follow up appointment.

## 2025-05-26 DIAGNOSIS — R56.9 SEIZURE: ICD-10-CM

## 2025-05-27 RX ORDER — LEVETIRACETAM 500 MG/1
500 TABLET ORAL 2 TIMES DAILY
Qty: 90 TABLET | Refills: 2 | Status: SHIPPED | OUTPATIENT
Start: 2025-05-27

## 2025-05-27 RX ORDER — CLOPIDOGREL BISULFATE 75 MG/1
75 TABLET ORAL DAILY
Qty: 90 TABLET | Refills: 1 | Status: SHIPPED | OUTPATIENT
Start: 2025-05-27

## 2025-06-05 DIAGNOSIS — N18.2 TYPE 2 DIABETES MELLITUS WITH STAGE 2 CHRONIC KIDNEY DISEASE, WITHOUT LONG-TERM CURRENT USE OF INSULIN: ICD-10-CM

## 2025-06-05 DIAGNOSIS — E11.22 TYPE 2 DIABETES MELLITUS WITH STAGE 2 CHRONIC KIDNEY DISEASE, WITHOUT LONG-TERM CURRENT USE OF INSULIN: ICD-10-CM

## 2025-06-05 RX ORDER — METFORMIN HYDROCHLORIDE 500 MG/1
500 TABLET ORAL
Qty: 90 TABLET | Refills: 1 | Status: SHIPPED | OUTPATIENT
Start: 2025-06-05

## 2025-06-08 DIAGNOSIS — F32.1 CURRENT MODERATE EPISODE OF MAJOR DEPRESSIVE DISORDER WITHOUT PRIOR EPISODE: ICD-10-CM

## 2025-06-09 RX ORDER — VENLAFAXINE HYDROCHLORIDE 75 MG/1
75 CAPSULE, EXTENDED RELEASE ORAL DAILY
Qty: 90 CAPSULE | Refills: 2 | Status: SHIPPED | OUTPATIENT
Start: 2025-06-09

## 2025-07-03 DIAGNOSIS — R56.9 SEIZURE: ICD-10-CM

## 2025-07-03 RX ORDER — LEVETIRACETAM 500 MG/1
500 TABLET ORAL 2 TIMES DAILY
Qty: 90 TABLET | Refills: 2 | Status: CANCELLED | OUTPATIENT
Start: 2025-07-03

## 2025-07-08 ENCOUNTER — HOSPITAL ENCOUNTER (OUTPATIENT)
Dept: RADIOLOGY | Facility: HOSPITAL | Age: 65
Discharge: HOME | End: 2025-07-08
Attending: INTERNAL MEDICINE
Payer: MEDICARE

## 2025-07-08 DIAGNOSIS — Z12.31 BREAST CANCER SCREENING BY MAMMOGRAM: ICD-10-CM

## 2025-07-08 PROCEDURE — 77063 BREAST TOMOSYNTHESIS BI: CPT | Mod: TC

## 2025-07-08 PROCEDURE — 77067 SCR MAMMO BI INCL CAD: CPT | Mod: 26,,, | Performed by: RADIOLOGY

## 2025-07-08 PROCEDURE — 77063 BREAST TOMOSYNTHESIS BI: CPT | Mod: 26,,, | Performed by: RADIOLOGY

## 2025-07-11 ENCOUNTER — TELEPHONE (OUTPATIENT)
Dept: RADIOLOGY | Facility: HOSPITAL | Age: 65
End: 2025-07-11
Payer: MEDICARE

## 2025-07-21 ENCOUNTER — OFFICE VISIT (OUTPATIENT)
Dept: OPHTHALMOLOGY | Facility: CLINIC | Age: 65
End: 2025-07-21
Payer: MEDICARE

## 2025-07-21 VITALS — WEIGHT: 135 LBS | BODY MASS INDEX: 21.19 KG/M2 | HEIGHT: 67 IN

## 2025-07-21 DIAGNOSIS — H40.1113 PRIMARY OPEN ANGLE GLAUCOMA (POAG) OF RIGHT EYE, SEVERE STAGE: Primary | ICD-10-CM

## 2025-07-21 DIAGNOSIS — H25.13 AGE-RELATED NUCLEAR CATARACT OF BOTH EYES: ICD-10-CM

## 2025-07-21 DIAGNOSIS — H40.1122 PRIMARY OPEN ANGLE GLAUCOMA (POAG) OF LEFT EYE, MODERATE STAGE: ICD-10-CM

## 2025-07-21 DIAGNOSIS — H52.202 MYOPIA OF LEFT EYE WITH ASTIGMATISM: ICD-10-CM

## 2025-07-21 DIAGNOSIS — H43.813 PVD (POSTERIOR VITREOUS DETACHMENT), BILATERAL: ICD-10-CM

## 2025-07-21 DIAGNOSIS — E11.9 TYPE 2 DIABETES MELLITUS WITHOUT RETINOPATHY: ICD-10-CM

## 2025-07-21 DIAGNOSIS — H52.12 MYOPIA OF LEFT EYE WITH ASTIGMATISM: ICD-10-CM

## 2025-07-21 PROCEDURE — 92133 CPTRZD OPH DX IMG PST SGM ON: CPT | Mod: PBBFAC,PN | Performed by: OPHTHALMOLOGY

## 2025-07-21 PROCEDURE — 99213 OFFICE O/P EST LOW 20 MIN: CPT | Mod: PBBFAC,PN,25

## 2025-07-21 PROCEDURE — 92133 CPTRZD OPH DX IMG PST SGM ON: CPT | Mod: PBBFAC,PN

## 2025-07-21 NOTE — PROGRESS NOTES
HPI     POAG, Right Eye, Severe     Additional comments: - Brimonidine 1 gtt TID OU  - Cosopt 1 gtt BID OU  - Latanoprost 1 gtt qHS OU           POAG, Left Eye, Moderate     Additional comments: - Brimonidine 1 gtt TID OU  - Cosopt 1 gtt BID OU  - Latanoprost 1 gtt qHS OU           DM II w/o Retinopathy     Additional comments: 04/15/25 HGBA1C 4.8           Comments    Here for 3 Month IOP Check OU.  - No new complaints at present time.  - States using drops as directed.            Last edited by Aline Elena LPN on 7/21/2025 12:00 PM.          Assessment /Plan     For exam results, see Encounter Report.    There are no diagnoses linked to this encounter.        OCT RNFL   07/21/25: 48 //76, red s/t/I yel n; yel inf  10/1/24: 52//76: red STI//yellow I  08/28/23: 49//77; red STI, green N // all green  08/25/22: 50//76; all red // yel I    OCT Mac 10/1/24  - OD: 191, generalized retinal atrophy  - OS: 249, good foveal contour    07/21/25:  RE: foveal atrophy, no IRF/Srf  LE: PFC, no IRF/SRF     HVF   8/3/22  - OD: 11/12 FL 38% FP 21% FN, unreliable dense SAD  - OS: 4/11 2% FP 0% FN, unreliable, nonspecific deficits   3/14/2025  OD: Unreliable with 8/14FL, 17%FL and moving head, dense superior and inferior arcuates  OS: More reliable, but moved head a lot?  Possible early nasal steps     12/27/22  - OD: 7/13 FL 8% FP 8% FN, dense superior defect mostly respecting horizontal; inf NS  - OS: 3/11 3% FP 5% FN, scattered inf and nasal misses - not matching prior misses    1/12/2024  OD: 4/13 FL, Borderline with superior altitudinal defect with central scotoma  OS: 5/11 FL, Borderline with early inferior nasal step  Stable from 12/27/22    A/P:    1. POAG, Right Eye, Severe  2. POAG, Left Eye, Moderate  - + FH, thin pachy  - gonio 2/3/22 open 360 OU  - target pressure 13 // 13, unknown tmax  - OCT RNFL 08/21 45 all red // 76 yellow I, rest green. Increased thinning OS compared to 8/2021  - 1/12/24- HVF stable w  "superior hemifield defect OD and scattered non-specific defects OS - **Hx of stroke in 2014 affecting right eye vision   - Current drops: xalantan QHS, cosopt BID, brimonidine TID  - Monocular precautions given  - OCT RNFL 10/1/24 stable from prior  - At this time, will not proceed with SLT or xen gel OS.  Patient denies any pain in OD and IOP is ok  12/27/22: new VF with stable findings OD (dens sup alt + early inf NS), os with nonspecific misses not consistent with prior. Continue drops as above. IOP doing well  - 8/28/2023: IOP 13 OU, at goal, OCT RNFL stable Cont drops, if IOP elevated at next, may need to rediscuss SLT OS  - 10/1/24: IOP elevated to 16//18. Out of brim for 2 months, using cosopt/xal. Oct RNFL stable from last few years. Will refill drops and recheck IOP in 2 months.   - 12/3/24: Patient here for IOP check after running out of drops last visit. IOP at goal today. Doing well. Continue current management.   - 3/14/2025: The patient presented for a HVF only.  The VF are not reliable, again. Will have to rely on the OCT and IOP more.  - 4/7/2025: IOP 11/10. Compliant with current regimen. RTC 3 months IOP check.   -07/21/25: 7//9. Compliant with regimen. OCTN stable. Return for 3 month IOP check.     3. NSC OU  - NVS, CTM     4. DM II w/o Retinopathy  Lab Results   Component Value Date    HGBA1C 4.8 04/15/2025     - encouraged good control/regular PCP f/u  - optos photos taken 07/2026  - yearly DFE due 07/2026     5. PVD OU  6. Flashing light in right eye  - RD precautions  - 07/21/25: reports flashing light in right eye for "past three years". Did not mention it before to eye providers. DFE stable without holes or tears.   - monitor     6. Monocular status  - Polycarb Mrx sent 10/1/24, patient is wearing       RTC 3 months IOP check          "

## 2025-07-22 ENCOUNTER — OFFICE VISIT (OUTPATIENT)
Dept: INTERNAL MEDICINE | Facility: CLINIC | Age: 65
End: 2025-07-22
Payer: MEDICARE

## 2025-07-22 ENCOUNTER — LAB VISIT (OUTPATIENT)
Dept: LAB | Facility: HOSPITAL | Age: 65
End: 2025-07-22
Attending: INTERNAL MEDICINE
Payer: MEDICARE

## 2025-07-22 VITALS
HEIGHT: 67 IN | HEART RATE: 70 BPM | OXYGEN SATURATION: 99 % | WEIGHT: 134.81 LBS | RESPIRATION RATE: 18 BRPM | DIASTOLIC BLOOD PRESSURE: 85 MMHG | SYSTOLIC BLOOD PRESSURE: 118 MMHG | TEMPERATURE: 98 F | BODY MASS INDEX: 21.16 KG/M2

## 2025-07-22 DIAGNOSIS — E11.22 TYPE 2 DIABETES MELLITUS WITH STAGE 2 CHRONIC KIDNEY DISEASE, WITHOUT LONG-TERM CURRENT USE OF INSULIN: ICD-10-CM

## 2025-07-22 DIAGNOSIS — N18.30 STAGE 3 CHRONIC KIDNEY DISEASE, UNSPECIFIED WHETHER STAGE 3A OR 3B CKD: ICD-10-CM

## 2025-07-22 DIAGNOSIS — E86.1 HYPOVOLEMIA: ICD-10-CM

## 2025-07-22 DIAGNOSIS — N18.2 TYPE 2 DIABETES MELLITUS WITH STAGE 2 CHRONIC KIDNEY DISEASE, WITHOUT LONG-TERM CURRENT USE OF INSULIN: ICD-10-CM

## 2025-07-22 DIAGNOSIS — N18.30 STAGE 3 CHRONIC KIDNEY DISEASE, UNSPECIFIED WHETHER STAGE 3A OR 3B CKD: Primary | ICD-10-CM

## 2025-07-22 LAB
ALBUMIN SERPL-MCNC: 3.5 G/DL (ref 3.4–4.8)
ALBUMIN/GLOB SERPL: 0.9 RATIO (ref 1.1–2)
ALP SERPL-CCNC: 66 UNIT/L (ref 40–150)
ALT SERPL-CCNC: 12 UNIT/L (ref 0–55)
ANION GAP SERPL CALC-SCNC: 8 MEQ/L
AST SERPL-CCNC: 14 UNIT/L (ref 11–45)
BILIRUB SERPL-MCNC: 0.6 MG/DL
BUN SERPL-MCNC: 9.1 MG/DL (ref 9.8–20.1)
CALCIUM SERPL-MCNC: 9.4 MG/DL (ref 8.4–10.2)
CHLORIDE SERPL-SCNC: 110 MMOL/L (ref 98–107)
CO2 SERPL-SCNC: 24 MMOL/L (ref 23–31)
CREAT SERPL-MCNC: 1.11 MG/DL (ref 0.55–1.02)
CREAT/UREA NIT SERPL: 8
EST. AVERAGE GLUCOSE BLD GHB EST-MCNC: 91.1 MG/DL
GFR SERPLBLD CREATININE-BSD FMLA CKD-EPI: 56 ML/MIN/1.73/M2
GLOBULIN SER-MCNC: 3.9 GM/DL (ref 2.4–3.5)
GLUCOSE SERPL-MCNC: 82 MG/DL (ref 82–115)
HBA1C MFR BLD: 4.8 %
POTASSIUM SERPL-SCNC: 3.5 MMOL/L (ref 3.5–5.1)
PROT SERPL-MCNC: 7.4 GM/DL (ref 5.8–7.6)
SODIUM SERPL-SCNC: 142 MMOL/L (ref 136–145)

## 2025-07-22 PROCEDURE — 36415 COLL VENOUS BLD VENIPUNCTURE: CPT

## 2025-07-22 PROCEDURE — 83036 HEMOGLOBIN GLYCOSYLATED A1C: CPT

## 2025-07-22 PROCEDURE — 99215 OFFICE O/P EST HI 40 MIN: CPT | Mod: PBBFAC | Performed by: INTERNAL MEDICINE

## 2025-07-22 PROCEDURE — 80053 COMPREHEN METABOLIC PANEL: CPT

## 2025-07-22 NOTE — PROGRESS NOTES
[facility] INTERNAL MEDICINE  OUTPATIENT OFFICE VISIT NOTE    SUBJECTIVE:    HPI: Alyssa Anderson is a 61 y.o. yo female non-insulin diabetes mellitus type II, and CVA.  Patient was previously seen by another provider at this clinic.  Last hemoglobin A1c 5.9, currently on Metformin 500 mg daily.  Patient is tolerating Mounjaro, continue.   She currently follows Neurology due to history of stroke, currently on Plavix and aspirin. No new symptoms. Patient has residual symptoms: expressive aphasia.  She reports that she had seizures in the past and was placed on Keppra by Neurology. She states that she stopped taking the medication and started having seizures. She started the medication again and reports she has not had a seizure in many years.  At previous appointments, patient stated that she fell in the parking lot. She stated that right shoulder was still hurting and reported numbness.This has resolved.   Patient denies chest pain, palpitations, and shortness of breath.   Patient denies fever, night sweats, chills, nausea, vomiting, diarrhea, constipation, weight loss, and changes in appetite.    Review of Systems:  Constitutional: No fever, No chills, No sweats, No weakness, No fatigue, No decreased activity.   Eye: No recent visual problem, No icterus, No double vision.  Ear/Nose/Mouth/Throat: No nasal congestion, No sore throat.   Respiratory: No shortness of breath, No cough, No sputum production, No hemoptysis,   No wheezing, No cyanosis.   Cardiovascular: No chest pain, No palpitations, No peripheral edema, No syncope.   Gastrointestinal: No nausea, No vomiting, No diarrhea, No constipation, No hematemesis.   Genitourinary: No dysuria, No hematuria, No change in urine stream, No urethral discharge.   Hematology/Lymphatics: No bruising tendency, No bleeding tendency.   Endocrine: No polyuria, No cold intolerance, No heat intolerance.   Immunologic: Not immunocompromised, No recurrent fevers.   Musculoskeletal: No  "joint pain, No claudication.   Integumentary: No rash, No pruritus, No abrasions, No petechiae.    Neurologic: Alert and oriented X4, No abnormal balance, No numbness, No tingling.   Psychiatric: No anxiety, no depression, Not suicidal, Not delusional, No hallucinations.     OBJECTIVE:    Vitals:   /85 (BP Location: Left arm, Patient Position: Sitting)   Pulse 70   Temp 97.9 °F (36.6 °C) (Oral)   Resp 18   Ht 5' 7" (1.702 m)   Wt 61.1 kg (134 lb 12.8 oz)   SpO2 99%   BMI 21.11 kg/m²      Physical Exam:  General: Alert and oriented, No acute distress.   Eye: Pupils are equal, round and reactive to light, Extraocular movements are intact,   Normal conjunctiva, Vision unchanged.   HENT: Normocephalic, Normal hearing, Oral mucosa is moist.   Neck: Supple, No carotid bruit, No jugular venous distention, No thyromegaly.   Respiratory: Lungs are clear to auscultation, Respirations are non-labored, Breath sounds   are equal, Symmetrical chest wall expansion, No chest wall tenderness.   Cardiovascular: Normal rate, Regular rhythm, No murmur, No gallop, Good pulses equal in   All extremities, Normal peripheral perfusion, No edema.   Gastrointestinal: Soft, Non-tender, Non-distended, Normal bowel sounds.   Genitourinary: No costovertebral angle tenderness, No suprapubic tenderness.   Musculoskeletal: Normal range of motion, Normal strength, No swelling, No deformity, Normal gait.   Integumentary: Warm, No pallor, No rash.   Neurologic: Alert, Oriented, Normal sensory, Normal motor function, No focal deficits,   Cranial Nerves II-XII are grossly intact, Monofilament examination: No sensory loss on plantar   or dorsal surface of feet bilaterally.   Psychiatric: Cooperative, Appropriate mood & affect.     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 Previously, reported this injury affects her activities of daily living. Patient reports she can not bathe, sew, and can not carry heavy items. Sister does laundry for her at this time because she can not complete on her own. The patient reports symptoms are much improved.   Last hemoglobin A1C 5,0, previously 5.9 continue Metformin 500 mg daily. Diabetic fundus photos no retinopathy, last appointment with Ophthalmology. Monofilament examination was normal 6/2023, next screening 6/2024. Lifestyle modifications. Currently on Mounjaro 10 mg qweekly, side effects and history including family history was discussed, no history of pancreatitis and no family history of any thyroid cancers.   US limited retroperitoneum- unremarkable renal ultrasound. Avoid nephrotoxic drugs. Referral to Nephrology completed.   Blood pressure 118/85. Continue medications Lisinopril 40 mg daily, Norvasc 10 mg daily, and Hydrochlorothiazide 12.5 mg daily. Patient reports systolic blood pressure 100-115.  Patient reports no new seizures. Continue Keppra. Patient is on 500 mg BID per last note. Continue to follow Neurology. Neurology has recommended to continue Plavix and Aspirin.  Vitamin D 21.4 Previously 13.1. Completed on Vitamin D weekly for 12 weeks. Recheck at next visit.   Controlled with Effexor, which she was started on by another provider.  Continue to follow up with Ophthalmology and continue eyedrops.  Creatinine 1.42 and then increased to 1.66, instructed patient to drink plenty of water.   Potassium 3.2, prescription sent to the pharmacy. The patient admit to low food intake and low water intake due to decrease appetite due to Mounjaro.     Mounjaro will help with weight loss medication is being requested for diabetes mellitus type 2.       At next visit, will discuss referring to Nephrology after CMP.     Yvonne  MD Jed  [facility]

## 2025-07-23 ENCOUNTER — HOSPITAL ENCOUNTER (OUTPATIENT)
Dept: RADIOLOGY | Facility: HOSPITAL | Age: 65
Discharge: HOME | End: 2025-07-23
Attending: INTERNAL MEDICINE
Payer: MEDICARE

## 2025-07-23 DIAGNOSIS — R92.8 ABNORMAL MAMMOGRAM: ICD-10-CM

## 2025-07-23 DIAGNOSIS — R56.9 SEIZURE: ICD-10-CM

## 2025-07-23 PROCEDURE — 77061 BREAST TOMOSYNTHESIS UNI: CPT | Mod: 26,RT,, | Performed by: RADIOLOGY

## 2025-07-23 PROCEDURE — 76642 ULTRASOUND BREAST LIMITED: CPT | Mod: 26,RT,, | Performed by: RADIOLOGY

## 2025-07-23 PROCEDURE — 77065 DX MAMMO INCL CAD UNI: CPT | Mod: 26,RT,, | Performed by: RADIOLOGY

## 2025-07-23 PROCEDURE — 76642 ULTRASOUND BREAST LIMITED: CPT | Mod: TC,RT

## 2025-07-23 PROCEDURE — 77061 BREAST TOMOSYNTHESIS UNI: CPT | Mod: TC,RT

## 2025-07-23 RX ORDER — LEVETIRACETAM 500 MG/1
500 TABLET ORAL 2 TIMES DAILY
Qty: 90 TABLET | Refills: 2 | Status: SHIPPED | OUTPATIENT
Start: 2025-07-23

## (undated) DEVICE — SOL IRRI STRL WATER 1000ML

## (undated) DEVICE — KIT SURGICAL COLON .25 1.1OZ

## (undated) DEVICE — MANIFOLD 4 PORT